# Patient Record
Sex: FEMALE | Race: WHITE | NOT HISPANIC OR LATINO | Employment: OTHER | URBAN - METROPOLITAN AREA
[De-identification: names, ages, dates, MRNs, and addresses within clinical notes are randomized per-mention and may not be internally consistent; named-entity substitution may affect disease eponyms.]

---

## 2018-08-02 ENCOUNTER — HOSPITAL ENCOUNTER (OUTPATIENT)
Dept: LAB | Facility: MEDICAL CENTER | Age: 73
End: 2018-08-02
Attending: UROLOGY
Payer: MEDICARE

## 2018-08-02 PROCEDURE — 87086 URINE CULTURE/COLONY COUNT: CPT

## 2018-08-04 LAB
AMBIGUOUS DTTM AMBI4: NORMAL
SIGNIFICANT IND 70042: NORMAL
SITE SITE: NORMAL
SOURCE SOURCE: NORMAL

## 2018-08-05 LAB
BACTERIA UR CULT: NORMAL
SIGNIFICANT IND 70042: NORMAL
SITE SITE: NORMAL
SOURCE SOURCE: NORMAL

## 2018-09-20 ENCOUNTER — HOSPITAL ENCOUNTER (OUTPATIENT)
Dept: LAB | Facility: MEDICAL CENTER | Age: 73
End: 2018-09-20
Attending: NURSE PRACTITIONER
Payer: MEDICARE

## 2018-09-20 LAB
INR PPP: 1.01 (ref 0.87–1.13)
PLATELET # BLD AUTO: 190 K/UL (ref 164–446)
PROTHROMBIN TIME: 13.4 SEC (ref 12–14.6)

## 2018-09-20 PROCEDURE — 85049 AUTOMATED PLATELET COUNT: CPT

## 2018-09-20 PROCEDURE — 36415 COLL VENOUS BLD VENIPUNCTURE: CPT

## 2018-09-20 PROCEDURE — 85610 PROTHROMBIN TIME: CPT

## 2018-09-20 RX ORDER — ATORVASTATIN CALCIUM 20 MG/1
TABLET, FILM COATED ORAL
COMMUNITY
Start: 2018-07-30 | End: 2018-09-20 | Stop reason: CLARIF

## 2018-09-20 RX ORDER — CARVEDILOL 12.5 MG/1
12.5 TABLET ORAL 2 TIMES DAILY WITH MEALS
COMMUNITY

## 2018-09-20 RX ORDER — LOVASTATIN 20 MG/1
20 TABLET ORAL NIGHTLY
Status: ON HOLD | COMMUNITY
End: 2019-03-07

## 2018-09-25 ENCOUNTER — APPOINTMENT (OUTPATIENT)
Dept: RADIOLOGY | Facility: MEDICAL CENTER | Age: 73
End: 2018-09-25
Attending: INTERNAL MEDICINE
Payer: MEDICARE

## 2018-09-25 ENCOUNTER — HOSPITAL ENCOUNTER (OUTPATIENT)
Facility: MEDICAL CENTER | Age: 73
End: 2018-09-25
Attending: INTERNAL MEDICINE | Admitting: INTERNAL MEDICINE
Payer: MEDICARE

## 2018-09-25 VITALS
SYSTOLIC BLOOD PRESSURE: 112 MMHG | RESPIRATION RATE: 16 BRPM | WEIGHT: 161 LBS | BODY MASS INDEX: 30.4 KG/M2 | OXYGEN SATURATION: 99 % | HEIGHT: 61 IN | HEART RATE: 74 BPM | DIASTOLIC BLOOD PRESSURE: 56 MMHG

## 2018-09-25 DIAGNOSIS — C67.4 MALIGNANT NEOPLASM OF POSTERIOR WALL OF URINARY BLADDER (HCC): ICD-10-CM

## 2018-09-25 PROCEDURE — 99153 MOD SED SAME PHYS/QHP EA: CPT

## 2018-09-25 PROCEDURE — 700111 HCHG RX REV CODE 636 W/ 250 OVERRIDE (IP)

## 2018-09-25 PROCEDURE — 160002 HCHG RECOVERY MINUTES (STAT)

## 2018-09-25 PROCEDURE — 700101 HCHG RX REV CODE 250

## 2018-09-25 RX ORDER — ONDANSETRON 2 MG/ML
4 INJECTION INTRAMUSCULAR; INTRAVENOUS PRN
Status: DISCONTINUED | OUTPATIENT
Start: 2018-09-25 | End: 2018-09-25 | Stop reason: HOSPADM

## 2018-09-25 RX ORDER — OXYCODONE HYDROCHLORIDE 5 MG/1
5 TABLET ORAL
Status: DISCONTINUED | OUTPATIENT
Start: 2018-09-25 | End: 2018-09-25 | Stop reason: HOSPADM

## 2018-09-25 RX ORDER — MORPHINE SULFATE 4 MG/ML
4 INJECTION, SOLUTION INTRAMUSCULAR; INTRAVENOUS
Status: DISCONTINUED | OUTPATIENT
Start: 2018-09-25 | End: 2018-09-25 | Stop reason: HOSPADM

## 2018-09-25 RX ORDER — SODIUM CHLORIDE 9 MG/ML
500 INJECTION, SOLUTION INTRAVENOUS
Status: DISCONTINUED | OUTPATIENT
Start: 2018-09-25 | End: 2018-09-25 | Stop reason: HOSPADM

## 2018-09-25 RX ORDER — ONDANSETRON 2 MG/ML
4 INJECTION INTRAMUSCULAR; INTRAVENOUS EVERY 8 HOURS PRN
Status: DISCONTINUED | OUTPATIENT
Start: 2018-09-25 | End: 2018-09-25 | Stop reason: HOSPADM

## 2018-09-25 RX ORDER — SODIUM CHLORIDE 9 MG/ML
120 INJECTION, SOLUTION INTRAVENOUS
Status: DISCONTINUED | OUTPATIENT
Start: 2018-09-25 | End: 2018-09-25 | Stop reason: HOSPADM

## 2018-09-25 RX ORDER — MIDAZOLAM HYDROCHLORIDE 1 MG/ML
INJECTION INTRAMUSCULAR; INTRAVENOUS
Status: COMPLETED
Start: 2018-09-25 | End: 2018-09-25

## 2018-09-25 RX ORDER — LIDOCAINE HYDROCHLORIDE 10 MG/ML
INJECTION, SOLUTION INFILTRATION; PERINEURAL
Status: COMPLETED
Start: 2018-09-25 | End: 2018-09-25

## 2018-09-25 RX ORDER — LIDOCAINE HYDROCHLORIDE AND EPINEPHRINE BITARTRATE 20; .01 MG/ML; MG/ML
INJECTION, SOLUTION SUBCUTANEOUS
Status: COMPLETED
Start: 2018-09-25 | End: 2018-09-25

## 2018-09-25 RX ORDER — OXYCODONE HYDROCHLORIDE 10 MG/1
10 TABLET ORAL
Status: DISCONTINUED | OUTPATIENT
Start: 2018-09-25 | End: 2018-09-25 | Stop reason: HOSPADM

## 2018-09-25 RX ORDER — MIDAZOLAM HYDROCHLORIDE 1 MG/ML
.5-2 INJECTION INTRAMUSCULAR; INTRAVENOUS PRN
Status: DISCONTINUED | OUTPATIENT
Start: 2018-09-25 | End: 2018-09-25 | Stop reason: HOSPADM

## 2018-09-25 RX ADMIN — MIDAZOLAM 2 MG: 1 INJECTION INTRAMUSCULAR; INTRAVENOUS at 13:19

## 2018-09-25 RX ADMIN — LIDOCAINE HYDROCHLORIDE AND EPINEPHRINE: 20; 10 INJECTION, SOLUTION INFILTRATION; PERINEURAL at 13:20

## 2018-09-25 RX ADMIN — HEPARIN: 100 SYRINGE at 13:43

## 2018-09-25 RX ADMIN — LIDOCAINE HYDROCHLORIDE: 10 INJECTION, SOLUTION INFILTRATION; PERINEURAL at 13:20

## 2018-09-25 RX ADMIN — MIDAZOLAM HYDROCHLORIDE 2 MG: 1 INJECTION INTRAMUSCULAR; INTRAVENOUS at 13:25

## 2018-09-25 RX ADMIN — FENTANYL CITRATE 25 MCG: 50 INJECTION, SOLUTION INTRAMUSCULAR; INTRAVENOUS at 13:19

## 2018-09-25 RX ADMIN — MIDAZOLAM HYDROCHLORIDE 2 MG: 1 INJECTION INTRAMUSCULAR; INTRAVENOUS at 13:19

## 2018-09-25 ASSESSMENT — PAIN SCALES - GENERAL: PAINLEVEL_OUTOF10: 0

## 2018-09-25 NOTE — OR SURGEON
Immediate Post- Operative Note        PostOp Diagnosis: Bladder cancer requiring chemotherapy.       Procedure(s): Port placement.       Estimated Blood Loss: Less than 5 ml        Complications: None            9/25/2018     1335 PM     Jeremy Doss

## 2018-09-25 NOTE — DISCHARGE INSTRUCTIONS
ACTIVITY: Rest and take it easy for the first 24 hours.  A responsible adult is recommended to remain with you during that time.  It is normal to feel sleepy.  We encourage you to not do anything that requires balance, judgment or coordination.    MILD FLU-LIKE SYMPTOMS ARE NORMAL. YOU MAY EXPERIENCE GENERALIZED MUSCLE ACHES, THROAT IRRITATION, HEADACHE AND/OR SOME NAUSEA.    FOR 24 HOURS DO NOT:  Drive, operate machinery or run household appliances.  Drink beer or alcoholic beverages.   Make important decisions or sign legal documents.    SPECIAL INSTRUCTIONS: No shower or bath for 1 week. Sponge Bath only.  Keep surgical site clean dry and covered until fully healed. Do not lift right arm above head for 1 week, Do not lift over 10 lbs for 1 week with right arm.     DIET: To avoid nausea, slowly advance diet as tolerated, avoiding spicy or greasy foods for the first day.  Add more substantial food to your diet according to your physician's instructions.  Babies can be fed formula or breast milk as soon as they are hungry.  INCREASE FLUIDS AND FIBER TO AVOID CONSTIPATION.    SURGICAL DRESSING/BATHING: No shower or bath for 1 week. Sponge Bath only.  Keep surgical site clean dry and covered until fully healed.    FOLLOW-UP APPOINTMENT:  A follow-up appointment should be arranged with your doctor in 1 week; call to schedule.    You should CALL YOUR PHYSICIAN if you develop:  Fever greater than 101 degrees F.  Pain not relieved by medication, or persistent nausea or vomiting.  Excessive bleeding (blood soaking through dressing) or unexpected drainage from the wound.  Extreme redness or swelling around the incision site, drainage of pus or foul smelling drainage.  Inability to urinate or empty your bladder within 8 hours.  Problems with breathing or chest pain.    You should call 911 if you develop problems with breathing or chest pain.  If you are unable to contact your doctor or surgical center, you should go to the  nearest emergency room or urgent care center.     If any questions arise, call your doctor.  If your doctor is not available, please feel free to call the Surgical Center at [phone].  The Center is open Monday through Friday from 7AM to 7PM.  You can also call the HEALTH HOTLINE open 24 hours/day, 7 days/week and speak to a nurse at [phone], or toll free at [phone].    A registered nurse may call you a few days after your surgery to see how you are doing after your procedure.    MEDICATIONS: Resume taking daily medication.  Take prescribed pain medication with food.  If no medication is prescribed, you may take non-aspirin pain medication if needed.  PAIN MEDICATION CAN BE VERY CONSTIPATING.  Take a stool softener or laxative such as senokot, pericolace, or milk of magnesia if needed.      If your physician has prescribed pain medication that includes Acetaminophen (Tylenol), do not take additional Acetaminophen (Tylenol) while taking the prescribed medication.    Depression / Suicide Risk    As you are discharged from this [facility] facility, it is important to learn how to keep safe from harming yourself.    Recognize the warning signs:  · Abrupt changes in personality, positive or negative- including increase in energy   · Giving away possessions  · Change in eating patterns- significant weight changes-  positive or negative  · Change in sleeping patterns- unable to sleep or sleeping all the time   · Unwillingness or inability to communicate  · Depression  · Unusual sadness, discouragement and loneliness  · Talk of wanting to die  · Neglect of personal appearance   · Rebelliousness- reckless behavior  · Withdrawal from people/activities they love  · Confusion- inability to concentrate     If you or a loved one observes any of these behaviors or has concerns about self-harm, here's what you can do:  · Talk about it- your feelings and reasons for harming yourself  · Remove any means that you  might use to hurt yourself (examples: pills, rope, extension cords, firearm)  · Get professional help from the community (Mental Health, Substance Abuse, psychological counseling)  · Do not be alone:Call your Safe Contact- someone whom you trust who will be there for you.  · Call your local CRISIS HOTLINE 535-9137 or 871-548-0822  · Call your local Children's Mobile Crisis Response Team [place] (353) 017-4756 or www.VideoElephant.com  · Call the toll free National Suicide Prevention Hotlines   · National Suicide Prevention Lifeline 218-157-GPNO (2090)  · National Hope Line Network 800-SUICIDE (862-7407)      Implanted Port Home Guide  An implanted port is a type of central line that is placed under the skin. Central lines are used to provide IV access when treatment or nutrition needs to be given through a person's veins. Implanted ports are used for long-term IV access. An implanted port may be placed because:   · You need IV medicine that would be irritating to the small veins in your hands or arms.    · You need long-term IV medicines, such as antibiotics.    · You need IV nutrition for a long period.    · You need frequent blood draws for lab tests.    · You need dialysis.    Implanted ports are usually placed in the chest area, but they can also be placed in the upper arm, the abdomen, or the leg. An implanted port has two main parts:   · [place]. The reservoir is round and will appear as a small, raised area under your skin. The reservoir is the part where a needle is inserted to give medicines or draw blood.    · Catheter. The catheter is a thin, flexible tube that extends from the reservoir. The catheter is placed into a large vein. Medicine that is inserted into the reservoir goes into the catheter and then into the vein.    HOW WILL I CARE FOR MY INCISION SITE?  Do not get the incision site wet. Bathe or shower as directed by your health care provider.   HOW IS MY PORT ACCESSED?  Special steps must  "be taken to access the port:   · Before the port is accessed, a numbing cream can be placed on the skin. This helps numb the skin over the port site.    · Your health care provider uses a sterile technique to access the port.  ¨ Your health care provider must put on a mask and sterile gloves.  ¨ The skin over your port is cleaned carefully with an antiseptic and allowed to dry.  ¨ The port is gently pinched between sterile gloves, and a needle is inserted into the port.  · Only \"non-coring\" port needles should be used to access the port. Once the port is accessed, a blood return should be checked. This helps ensure that the port is in the vein and is not clogged.    · If your port needs to remain accessed for a constant infusion, a clear (transparent) bandage will be placed over the needle site. The bandage and needle will need to be changed every week, or as directed by your health care provider.    · Keep the bandage covering the needle clean and dry. Do not get it wet. Follow your health care provider's instructions on how to take a shower or bath while the port is accessed.    · If your port does not need to stay accessed, no bandage is needed over the port.    WHAT IS FLUSHING?  Flushing helps keep the port from getting clogged. Follow your health care provider's instructions on how and when to flush the port. Ports are usually flushed with saline solution or a medicine called heparin. The need for flushing will depend on how the port is used.   · If the port is used for intermittent medicines or blood draws, the port will need to be flushed:    ¨ After medicines have been given.    ¨ After blood has been drawn.    ¨ As part of routine maintenance.    · If a constant infusion is running, the port may not need to be flushed.    HOW LONG WILL MY PORT STAY IMPLANTED?  The port can stay in for as long as your health care provider thinks it is needed. When it is time for the port to come out, surgery will be done to " remove it. The procedure is similar to the one performed when the port was put in.   WHEN SHOULD I SEEK IMMEDIATE MEDICAL CARE?  When you have an implanted port, you should seek immediate medical care if:   · You notice a bad smell coming from the incision site.    · You have swelling, redness, or drainage at the incision site.    · You have more swelling or pain at the port site or the surrounding area.    · You have a fever that is not controlled with medicine.     This information is not intended to replace advice given to you by your health care provider. Make sure you discuss any questions you have with your health care provider.     Document Released: 12/18/2006 Document Revised: 10/08/2014 Document Reviewed: 08/25/2014  [employer] Interactive Patient Education ©2016 [employer]    Implanted Port Insertion, Care After  Refer to this sheet in the next few weeks. These instructions provide you with information on caring for yourself after your procedure. Your health care provider may also give you more specific instructions. Your treatment has been planned according to current medical practices, but problems sometimes occur. Call your health care provider if you have any problems or questions after your procedure.  WHAT TO EXPECT AFTER THE PROCEDURE  After your procedure, it is typical to have the following:   · Discomfort at the port insertion site. Ice packs to the area will help.  · Bruising on the skin over the port. This will subside in 3-4 days.  HOME CARE INSTRUCTIONS  · After your port is placed, you will get a 's information card. The card has information about your port. Keep this card with you at all times.    · Know what kind of port you have. There are many types of ports available.    · Wear a medical alert bracelet in case of an emergency. This can help alert health care workers that you have a port.    · The port can stay in for as long as your health care provider believes it is  necessary.    · A home health care nurse may give medicines and take care of the port.    · You or a family member can get special training and directions for giving medicine and taking care of the port at home.    SEEK MEDICAL CARE IF:   · Your port does not flush or you are unable to get a blood return.    · You have a fever or chills.  SEEK IMMEDIATE MEDICAL CARE IF:  · You have new fluid or pus coming from your incision.    · You notice a bad smell coming from your incision site.    · You have swelling, pain, or more redness at the incision or port site.    · You have chest pain or shortness of breath.     This information is not intended to replace advice given to you by your health care provider. Make sure you discuss any questions you have with your health care provider.     Document Released: 10/08/2014 Document Revised: 12/23/2014 Document Reviewed: 10/08/2014  [employer] Interactive Patient Education ©2016 [employer]

## 2018-09-25 NOTE — PROGRESS NOTES
OP IR RN note:    Site Marked and Confirmed with MD, patient and RN pre procedure   Tunneled port placement by MD Doss assisted by RT glaser, Right chest and IJ access site;  Port placed   BARD PowerPort ClearVUE Slim implantable port 8 Fr 24 cm REF# [id number] LOT# [id number]   End tidal CO2 range 26-29 during procedure   Patient tolerated procedure, hemodynamically stable; pt awake and talking post procedure; see flow sheet for vitals and post op print out    patient transported back to OP IR pod 1 via IR RN monitored

## 2018-09-25 NOTE — PROGRESS NOTES
Discharge instructions reviewed with patient and family, all questions answered. IV removed and belongings returned.  Patient ambulated out in a stable condition.     ACTIVITY: Rest and take it easy for the first 24 hours.  A responsible adult is recommended to remain with you during that time.  It is normal to feel sleepy.  We encourage you to not do anything that requires balance, judgment or coordination.    MILD FLU-LIKE SYMPTOMS ARE NORMAL. YOU MAY EXPERIENCE GENERALIZED MUSCLE ACHES, THROAT IRRITATION, HEADACHE AND/OR SOME NAUSEA.    FOR 24 HOURS DO NOT:  Drive, operate machinery or run household appliances.  Drink beer or alcoholic beverages.   Make important decisions or sign legal documents.    SPECIAL INSTRUCTIONS: No shower or bath for 1 week. Sponge Bath only.  Keep surgical site clean dry and covered until fully healed. Do not lift right arm above head for 1 week, Do not lift over 10 lbs for 1 week with right arm.     DIET: To avoid nausea, slowly advance diet as tolerated, avoiding spicy or greasy foods for the first day.  Add more substantial food to your diet according to your physician's instructions.  Babies can be fed formula or breast milk as soon as they are hungry.  INCREASE FLUIDS AND FIBER TO AVOID CONSTIPATION.    SURGICAL DRESSING/BATHING: No shower or bath for 1 week. Sponge Bath only.  Keep surgical site clean dry and covered until fully healed.    FOLLOW-UP APPOINTMENT:  A follow-up appointment should be arranged with your doctor in 1 week; call to schedule.    You should CALL YOUR PHYSICIAN if you develop:  Fever greater than 101 degrees F.  Pain not relieved by medication, or persistent nausea or vomiting.  Excessive bleeding (blood soaking through dressing) or unexpected drainage from the wound.  Extreme redness or swelling around the incision site, drainage of pus or foul smelling drainage.  Inability to urinate or empty your bladder within 8 hours.  Problems with breathing or chest  pain.    You should call 911 if you develop problems with breathing or chest pain.  If you are unable to contact your doctor or surgical center, you should go to the nearest emergency room or urgent care center.     If any questions arise, call your doctor.  If your doctor is not available, please feel free to call the Surgical Center at [phone].  The Center is open Monday through Friday from 7AM to 7PM.  You can also call the HEALTH HOTLINE open 24 hours/day, 7 days/week and speak to a nurse at [phone], or toll free at [phone].    A registered nurse may call you a few days after your surgery to see how you are doing after your procedure.    MEDICATIONS: Resume taking daily medication.  Take prescribed pain medication with food.  If no medication is prescribed, you may take non-aspirin pain medication if needed.  PAIN MEDICATION CAN BE VERY CONSTIPATING.  Take a stool softener or laxative such as senokot, pericolace, or milk of magnesia if needed.      If your physician has prescribed pain medication that includes Acetaminophen (Tylenol), do not take additional Acetaminophen (Tylenol) while taking the prescribed medication.    Depression / Suicide Risk    As you are discharged from this [facility] facility, it is important to learn how to keep safe from harming yourself.    Recognize the warning signs:  · Abrupt changes in personality, positive or negative- including increase in energy   · Giving away possessions  · Change in eating patterns- significant weight changes-  positive or negative  · Change in sleeping patterns- unable to sleep or sleeping all the time   · Unwillingness or inability to communicate  · Depression  · Unusual sadness, discouragement and loneliness  · Talk of wanting to die  · Neglect of personal appearance   · Rebelliousness- reckless behavior  · Withdrawal from people/activities they love  · Confusion- inability to concentrate     If you or a loved one observes any of  these behaviors or has concerns about self-harm, here's what you can do:  · Talk about it- your feelings and reasons for harming yourself  · Remove any means that you might use to hurt yourself (examples: pills, rope, extension cords, firearm)  · Get professional help from the community (Mental Health, Substance Abuse, psychological counseling)  · Do not be alone:Call your Safe Contact- someone whom you trust who will be there for you.  · Call your local CRISIS HOTLINE 911-4557 or 893-290-3523  · Call your local Children's Mobile Crisis Response Team [place] (587) 643-2548 or www.[employer]  · Call the toll free National Suicide Prevention Hotlines   · National Suicide Prevention Lifeline 347-288-DVKW (5167)  · National Hope Line Network 800-SUICIDE (146-0178)      Implanted Port Home Guide  An implanted port is a type of central line that is placed under the skin. Central lines are used to provide IV access when treatment or nutrition needs to be given through a person's veins. Implanted ports are used for long-term IV access. An implanted port may be placed because:   · You need IV medicine that would be irritating to the small veins in your hands or arms.    · You need long-term IV medicines, such as antibiotics.    · You need IV nutrition for a long period.    · You need frequent blood draws for lab tests.    · You need dialysis.    Implanted ports are usually placed in the chest area, but they can also be placed in the upper arm, the abdomen, or the leg. An implanted port has two main parts:   · [place]. The reservoir is round and will appear as a small, raised area under your skin. The reservoir is the part where a needle is inserted to give medicines or draw blood.    · Catheter. The catheter is a thin, flexible tube that extends from the reservoir. The catheter is placed into a large vein. Medicine that is inserted into the reservoir goes into the catheter and then into the vein.    HOW WILL I  "CARE FOR MY INCISION SITE?  Do not get the incision site wet. Bathe or shower as directed by your health care provider.   HOW IS MY PORT ACCESSED?  Special steps must be taken to access the port:   · Before the port is accessed, a numbing cream can be placed on the skin. This helps numb the skin over the port site.    · Your health care provider uses a sterile technique to access the port.  ¨ Your health care provider must put on a mask and sterile gloves.  ¨ The skin over your port is cleaned carefully with an antiseptic and allowed to dry.  ¨ The port is gently pinched between sterile gloves, and a needle is inserted into the port.  · Only \"non-coring\" port needles should be used to access the port. Once the port is accessed, a blood return should be checked. This helps ensure that the port is in the vein and is not clogged.    · If your port needs to remain accessed for a constant infusion, a clear (transparent) bandage will be placed over the needle site. The bandage and needle will need to be changed every week, or as directed by your health care provider.    · Keep the bandage covering the needle clean and dry. Do not get it wet. Follow your health care provider's instructions on how to take a shower or bath while the port is accessed.    · If your port does not need to stay accessed, no bandage is needed over the port.    WHAT IS FLUSHING?  Flushing helps keep the port from getting clogged. Follow your health care provider's instructions on how and when to flush the port. Ports are usually flushed with saline solution or a medicine called heparin. The need for flushing will depend on how the port is used.   · If the port is used for intermittent medicines or blood draws, the port will need to be flushed:    ¨ After medicines have been given.    ¨ After blood has been drawn.    ¨ As part of routine maintenance.    · If a constant infusion is running, the port may not need to be flushed.    HOW LONG WILL MY " PORT STAY IMPLANTED?  The port can stay in for as long as your health care provider thinks it is needed. When it is time for the port to come out, surgery will be done to remove it. The procedure is similar to the one performed when the port was put in.   WHEN SHOULD I SEEK IMMEDIATE MEDICAL CARE?  When you have an implanted port, you should seek immediate medical care if:   · You notice a bad smell coming from the incision site.    · You have swelling, redness, or drainage at the incision site.    · You have more swelling or pain at the port site or the surrounding area.    · You have a fever that is not controlled with medicine.     This information is not intended to replace advice given to you by your health care provider. Make sure you discuss any questions you have with your health care provider.     Document Released: 12/18/2006 Document Revised: 10/08/2014 Document Reviewed: 08/25/2014  [employer] Interactive Patient Education ©2016 [employer]    Implanted Port Insertion, Care After  Refer to this sheet in the next few weeks. These instructions provide you with information on caring for yourself after your procedure. Your health care provider may also give you more specific instructions. Your treatment has been planned according to current medical practices, but problems sometimes occur. Call your health care provider if you have any problems or questions after your procedure.  WHAT TO EXPECT AFTER THE PROCEDURE  After your procedure, it is typical to have the following:   · Discomfort at the port insertion site. Ice packs to the area will help.  · Bruising on the skin over the port. This will subside in 3-4 days.  HOME CARE INSTRUCTIONS  · After your port is placed, you will get a 's information card. The card has information about your port. Keep this card with you at all times.    · Know what kind of port you have. There are many types of ports available.    · Wear a medical alert bracelet in  case of an emergency. This can help alert health care workers that you have a port.    · The port can stay in for as long as your health care provider believes it is necessary.    · A home health care nurse may give medicines and take care of the port.    · You or a family member can get special training and directions for giving medicine and taking care of the port at home.    SEEK MEDICAL CARE IF:   · Your port does not flush or you are unable to get a blood return.    · You have a fever or chills.  SEEK IMMEDIATE MEDICAL CARE IF:  · You have new fluid or pus coming from your incision.    · You notice a bad smell coming from your incision site.    · You have swelling, pain, or more redness at the incision or port site.    · You have chest pain or shortness of breath.     This information is not intended to replace advice given to you by your health care provider. Make sure you discuss any questions you have with your health care provider.     Document Released: 10/08/2014 Document Revised: 12/23/2014 Document Reviewed: 10/08/2014  [employer] Interactive Patient Education ©2016 [employer]

## 2018-09-27 ENCOUNTER — HOSPITAL ENCOUNTER (OUTPATIENT)
Dept: RADIOLOGY | Facility: MEDICAL CENTER | Age: 73
End: 2018-09-27
Attending: INTERNAL MEDICINE
Payer: MEDICARE

## 2018-09-27 DIAGNOSIS — C67.4 MALIGNANT NEOPLASM OF POSTERIOR WALL OF URINARY BLADDER (HCC): ICD-10-CM

## 2018-09-27 PROCEDURE — 700117 HCHG RX CONTRAST REV CODE 255: Performed by: INTERNAL MEDICINE

## 2018-09-27 PROCEDURE — A9503 TC99M MEDRONATE: HCPCS

## 2018-09-27 PROCEDURE — 71260 CT THORAX DX C+: CPT

## 2018-09-27 RX ADMIN — IOHEXOL 100 ML: 350 INJECTION, SOLUTION INTRAVENOUS at 15:32

## 2018-09-27 RX ADMIN — IOHEXOL 50 ML: 240 INJECTION, SOLUTION INTRATHECAL; INTRAVASCULAR; INTRAVENOUS; ORAL at 15:32

## 2018-10-15 ENCOUNTER — HOSPITAL ENCOUNTER (OUTPATIENT)
Dept: LAB | Facility: MEDICAL CENTER | Age: 73
End: 2018-10-15
Attending: INTERNAL MEDICINE
Payer: MEDICARE

## 2018-10-15 PROCEDURE — 80053 COMPREHEN METABOLIC PANEL: CPT

## 2018-10-15 PROCEDURE — 36415 COLL VENOUS BLD VENIPUNCTURE: CPT

## 2018-10-15 PROCEDURE — 83735 ASSAY OF MAGNESIUM: CPT

## 2018-10-16 LAB
ALBUMIN SERPL BCP-MCNC: 3.8 G/DL (ref 3.2–4.9)
ALBUMIN/GLOB SERPL: 1.5 G/DL
ALP SERPL-CCNC: 80 U/L (ref 30–99)
ALT SERPL-CCNC: 17 U/L (ref 2–50)
ANION GAP SERPL CALC-SCNC: 4 MMOL/L (ref 0–11.9)
AST SERPL-CCNC: 16 U/L (ref 12–45)
BILIRUB SERPL-MCNC: 0.3 MG/DL (ref 0.1–1.5)
BUN SERPL-MCNC: 18 MG/DL (ref 8–22)
CALCIUM SERPL-MCNC: 9.2 MG/DL (ref 8.5–10.5)
CHLORIDE SERPL-SCNC: 107 MMOL/L (ref 96–112)
CO2 SERPL-SCNC: 27 MMOL/L (ref 20–33)
CREAT SERPL-MCNC: 0.95 MG/DL (ref 0.5–1.4)
GLOBULIN SER CALC-MCNC: 2.5 G/DL (ref 1.9–3.5)
GLUCOSE SERPL-MCNC: 106 MG/DL (ref 65–99)
MAGNESIUM SERPL-MCNC: 2.2 MG/DL (ref 1.5–2.5)
POTASSIUM SERPL-SCNC: 4.3 MMOL/L (ref 3.6–5.5)
PROT SERPL-MCNC: 6.3 G/DL (ref 6–8.2)
SODIUM SERPL-SCNC: 138 MMOL/L (ref 135–145)

## 2018-10-29 ENCOUNTER — HOSPITAL ENCOUNTER (OUTPATIENT)
Dept: LAB | Facility: MEDICAL CENTER | Age: 73
End: 2018-10-29
Attending: INTERNAL MEDICINE
Payer: MEDICARE

## 2018-10-29 LAB
ALBUMIN SERPL BCP-MCNC: 4.2 G/DL (ref 3.2–4.9)
ALBUMIN/GLOB SERPL: 2 G/DL
ALP SERPL-CCNC: 75 U/L (ref 30–99)
ALT SERPL-CCNC: 16 U/L (ref 2–50)
ANION GAP SERPL CALC-SCNC: 6 MMOL/L (ref 0–11.9)
AST SERPL-CCNC: 17 U/L (ref 12–45)
BILIRUB SERPL-MCNC: 0.3 MG/DL (ref 0.1–1.5)
BUN SERPL-MCNC: 17 MG/DL (ref 8–22)
CALCIUM SERPL-MCNC: 9.4 MG/DL (ref 8.5–10.5)
CHLORIDE SERPL-SCNC: 108 MMOL/L (ref 96–112)
CO2 SERPL-SCNC: 30 MMOL/L (ref 20–33)
CREAT SERPL-MCNC: 0.99 MG/DL (ref 0.5–1.4)
GLOBULIN SER CALC-MCNC: 2.1 G/DL (ref 1.9–3.5)
GLUCOSE SERPL-MCNC: 113 MG/DL (ref 65–99)
MAGNESIUM SERPL-MCNC: 2.2 MG/DL (ref 1.5–2.5)
POTASSIUM SERPL-SCNC: 4.2 MMOL/L (ref 3.6–5.5)
PROT SERPL-MCNC: 6.3 G/DL (ref 6–8.2)
SODIUM SERPL-SCNC: 144 MMOL/L (ref 135–145)

## 2018-10-29 PROCEDURE — 83735 ASSAY OF MAGNESIUM: CPT

## 2018-10-29 PROCEDURE — 80053 COMPREHEN METABOLIC PANEL: CPT

## 2018-10-29 PROCEDURE — 36415 COLL VENOUS BLD VENIPUNCTURE: CPT

## 2018-11-16 ENCOUNTER — HOSPITAL ENCOUNTER (OUTPATIENT)
Dept: LAB | Facility: MEDICAL CENTER | Age: 73
End: 2018-11-16
Attending: INTERNAL MEDICINE
Payer: MEDICARE

## 2018-11-16 PROCEDURE — 83735 ASSAY OF MAGNESIUM: CPT

## 2018-11-16 PROCEDURE — 80053 COMPREHEN METABOLIC PANEL: CPT

## 2018-11-16 PROCEDURE — 36415 COLL VENOUS BLD VENIPUNCTURE: CPT

## 2018-11-17 LAB
ALBUMIN SERPL BCP-MCNC: 3.6 G/DL (ref 3.2–4.9)
ALBUMIN/GLOB SERPL: 1.6 G/DL
ALP SERPL-CCNC: 71 U/L (ref 30–99)
ALT SERPL-CCNC: 11 U/L (ref 2–50)
ANION GAP SERPL CALC-SCNC: 6 MMOL/L (ref 0–11.9)
AST SERPL-CCNC: 13 U/L (ref 12–45)
BILIRUB SERPL-MCNC: 0.3 MG/DL (ref 0.1–1.5)
BUN SERPL-MCNC: 22 MG/DL (ref 8–22)
CALCIUM SERPL-MCNC: 9.2 MG/DL (ref 8.5–10.5)
CHLORIDE SERPL-SCNC: 105 MMOL/L (ref 96–112)
CO2 SERPL-SCNC: 27 MMOL/L (ref 20–33)
CREAT SERPL-MCNC: 0.78 MG/DL (ref 0.5–1.4)
GLOBULIN SER CALC-MCNC: 2.2 G/DL (ref 1.9–3.5)
GLUCOSE SERPL-MCNC: 113 MG/DL (ref 65–99)
MAGNESIUM SERPL-MCNC: 1.9 MG/DL (ref 1.5–2.5)
POTASSIUM SERPL-SCNC: 3.9 MMOL/L (ref 3.6–5.5)
PROT SERPL-MCNC: 5.8 G/DL (ref 6–8.2)
SODIUM SERPL-SCNC: 138 MMOL/L (ref 135–145)

## 2018-12-03 ENCOUNTER — HOSPITAL ENCOUNTER (OUTPATIENT)
Facility: MEDICAL CENTER | Age: 73
End: 2018-12-03
Attending: INTERNAL MEDICINE
Payer: MEDICARE

## 2018-12-03 LAB
APPEARANCE UR: CLEAR
BACTERIA #/AREA URNS HPF: ABNORMAL /HPF
BILIRUB UR QL STRIP.AUTO: NEGATIVE
COLOR UR: YELLOW
EPI CELLS #/AREA URNS HPF: NEGATIVE /HPF
GLUCOSE UR STRIP.AUTO-MCNC: NEGATIVE MG/DL
HYALINE CASTS #/AREA URNS LPF: ABNORMAL /LPF
KETONES UR STRIP.AUTO-MCNC: NEGATIVE MG/DL
LEUKOCYTE ESTERASE UR QL STRIP.AUTO: ABNORMAL
MICRO URNS: ABNORMAL
NITRITE UR QL STRIP.AUTO: NEGATIVE
PH UR STRIP.AUTO: 5.5 [PH]
PROT UR QL STRIP: 30 MG/DL
RBC # URNS HPF: ABNORMAL /HPF
RBC UR QL AUTO: NEGATIVE
SP GR UR STRIP.AUTO: 1.02
UROBILINOGEN UR STRIP.AUTO-MCNC: 0.2 MG/DL
WBC #/AREA URNS HPF: ABNORMAL /HPF

## 2018-12-03 PROCEDURE — 87086 URINE CULTURE/COLONY COUNT: CPT

## 2018-12-03 PROCEDURE — 81001 URINALYSIS AUTO W/SCOPE: CPT

## 2018-12-05 LAB
BACTERIA UR CULT: NORMAL
SIGNIFICANT IND 70042: NORMAL
SITE SITE: NORMAL
SOURCE SOURCE: NORMAL

## 2018-12-12 ENCOUNTER — HOSPITAL ENCOUNTER (OUTPATIENT)
Dept: LAB | Facility: MEDICAL CENTER | Age: 73
End: 2018-12-12
Attending: INTERNAL MEDICINE
Payer: MEDICARE

## 2018-12-12 LAB
ALBUMIN SERPL BCP-MCNC: 3.8 G/DL (ref 3.2–4.9)
ALBUMIN/GLOB SERPL: 1.9 G/DL
ALP SERPL-CCNC: 117 U/L (ref 30–99)
ALT SERPL-CCNC: 8 U/L (ref 2–50)
ANION GAP SERPL CALC-SCNC: 8 MMOL/L (ref 0–11.9)
ANISOCYTOSIS BLD QL SMEAR: ABNORMAL
APPEARANCE UR: CLEAR
AST SERPL-CCNC: 12 U/L (ref 12–45)
BACTERIA #/AREA URNS HPF: NEGATIVE /HPF
BASOPHILS # BLD AUTO: 0.9 % (ref 0–1.8)
BASOPHILS # BLD: 0.07 K/UL (ref 0–0.12)
BILIRUB SERPL-MCNC: 0.3 MG/DL (ref 0.1–1.5)
BILIRUB UR QL STRIP.AUTO: NEGATIVE
BUN SERPL-MCNC: 24 MG/DL (ref 8–22)
CALCIUM SERPL-MCNC: 9.3 MG/DL (ref 8.5–10.5)
CHLORIDE SERPL-SCNC: 103 MMOL/L (ref 96–112)
CO2 SERPL-SCNC: 28 MMOL/L (ref 20–33)
COLOR UR: YELLOW
CREAT SERPL-MCNC: 0.79 MG/DL (ref 0.5–1.4)
DACRYOCYTES BLD QL SMEAR: NORMAL
DOHLE BOD BLD QL SMEAR: NORMAL
EOSINOPHIL # BLD AUTO: 0 K/UL (ref 0–0.51)
EOSINOPHIL NFR BLD: 0 % (ref 0–6.9)
EPI CELLS #/AREA URNS HPF: ABNORMAL /HPF
ERYTHROCYTE [DISTWIDTH] IN BLOOD BY AUTOMATED COUNT: 47.6 FL (ref 35.9–50)
GLOBULIN SER CALC-MCNC: 2 G/DL (ref 1.9–3.5)
GLUCOSE SERPL-MCNC: 108 MG/DL (ref 65–99)
GLUCOSE UR STRIP.AUTO-MCNC: NEGATIVE MG/DL
HCT VFR BLD AUTO: 26.6 % (ref 37–47)
HGB BLD-MCNC: 9.1 G/DL (ref 12–16)
HYALINE CASTS #/AREA URNS LPF: ABNORMAL /LPF
KETONES UR STRIP.AUTO-MCNC: ABNORMAL MG/DL
LEUKOCYTE ESTERASE UR QL STRIP.AUTO: ABNORMAL
LYMPHOCYTES # BLD AUTO: 1.62 K/UL (ref 1–4.8)
LYMPHOCYTES NFR BLD: 20.5 % (ref 22–41)
MAGNESIUM SERPL-MCNC: 1.6 MG/DL (ref 1.5–2.5)
MANUAL DIFF BLD: NORMAL
MCH RBC QN AUTO: 32.2 PG (ref 27–33)
MCHC RBC AUTO-ENTMCNC: 34.2 G/DL (ref 33.6–35)
MCV RBC AUTO: 94 FL (ref 81.4–97.8)
METAMYELOCYTES NFR BLD MANUAL: 1.8 %
MICRO URNS: ABNORMAL
MICROCYTES BLD QL SMEAR: ABNORMAL
MONOCYTES # BLD AUTO: 0.14 K/UL (ref 0–0.85)
MONOCYTES NFR BLD AUTO: 1.8 % (ref 0–13.4)
MORPHOLOGY BLD-IMP: NORMAL
NEUTROPHILS # BLD AUTO: 5.93 K/UL (ref 2–7.15)
NEUTROPHILS NFR BLD: 65.2 % (ref 44–72)
NEUTS BAND NFR BLD MANUAL: 9.8 % (ref 0–10)
NITRITE UR QL STRIP.AUTO: NEGATIVE
NRBC # BLD AUTO: 0 K/UL
NRBC BLD-RTO: 0 /100 WBC
OVALOCYTES BLD QL SMEAR: NORMAL
PH UR STRIP.AUTO: 5.5 [PH]
PLATELET # BLD AUTO: 14 K/UL (ref 164–446)
PLATELET BLD QL SMEAR: NORMAL
PLATELETS.RETICULATED NFR BLD AUTO: 6.4 K/UL (ref 0.6–13.1)
PMV BLD AUTO: 12.8 FL (ref 9–12.9)
POIKILOCYTOSIS BLD QL SMEAR: NORMAL
POTASSIUM SERPL-SCNC: 4 MMOL/L (ref 3.6–5.5)
PROT SERPL-MCNC: 5.8 G/DL (ref 6–8.2)
PROT UR QL STRIP: 30 MG/DL
RBC # BLD AUTO: 2.83 M/UL (ref 4.2–5.4)
RBC # URNS HPF: ABNORMAL /HPF
RBC BLD AUTO: PRESENT
RBC UR QL AUTO: ABNORMAL
SODIUM SERPL-SCNC: 139 MMOL/L (ref 135–145)
SP GR UR STRIP.AUTO: 1.03
TOXIC GRANULES BLD QL SMEAR: NORMAL
UROBILINOGEN UR STRIP.AUTO-MCNC: 0.2 MG/DL
WBC # BLD AUTO: 7.9 K/UL (ref 4.8–10.8)
WBC #/AREA URNS HPF: ABNORMAL /HPF

## 2018-12-12 PROCEDURE — 81001 URINALYSIS AUTO W/SCOPE: CPT

## 2018-12-12 PROCEDURE — 83735 ASSAY OF MAGNESIUM: CPT

## 2018-12-12 PROCEDURE — 36415 COLL VENOUS BLD VENIPUNCTURE: CPT

## 2018-12-12 PROCEDURE — 80053 COMPREHEN METABOLIC PANEL: CPT

## 2018-12-12 PROCEDURE — 87086 URINE CULTURE/COLONY COUNT: CPT

## 2018-12-12 PROCEDURE — 85007 BL SMEAR W/DIFF WBC COUNT: CPT

## 2018-12-12 PROCEDURE — 99285 EMERGENCY DEPT VISIT HI MDM: CPT

## 2018-12-12 PROCEDURE — 85055 RETICULATED PLATELET ASSAY: CPT

## 2018-12-12 PROCEDURE — 85027 COMPLETE CBC AUTOMATED: CPT

## 2018-12-12 ASSESSMENT — PAIN SCALES - GENERAL: PAINLEVEL_OUTOF10: 4

## 2018-12-13 ENCOUNTER — HOSPITAL ENCOUNTER (EMERGENCY)
Facility: MEDICAL CENTER | Age: 73
End: 2018-12-13
Attending: EMERGENCY MEDICINE
Payer: MEDICARE

## 2018-12-13 VITALS
HEART RATE: 103 BPM | HEIGHT: 61 IN | WEIGHT: 164.68 LBS | DIASTOLIC BLOOD PRESSURE: 65 MMHG | BODY MASS INDEX: 31.09 KG/M2 | OXYGEN SATURATION: 93 % | TEMPERATURE: 97.7 F | SYSTOLIC BLOOD PRESSURE: 135 MMHG | RESPIRATION RATE: 16 BRPM

## 2018-12-13 DIAGNOSIS — D69.6 THROMBOCYTOPENIA (HCC): ICD-10-CM

## 2018-12-13 LAB
ABO GROUP BLD: NORMAL
ALBUMIN SERPL BCP-MCNC: 4 G/DL (ref 3.2–4.9)
ALBUMIN/GLOB SERPL: 1.8 G/DL
ALP SERPL-CCNC: 103 U/L (ref 30–99)
ALT SERPL-CCNC: 9 U/L (ref 2–50)
ANION GAP SERPL CALC-SCNC: 10 MMOL/L (ref 0–11.9)
ANISOCYTOSIS BLD QL SMEAR: ABNORMAL
AST SERPL-CCNC: 14 U/L (ref 12–45)
BARCODED ABORH UBTYP: 5100
BARCODED PRD CODE UBPRD: NORMAL
BARCODED UNIT NUM UBUNT: NORMAL
BASOPHILS # BLD AUTO: 0 % (ref 0–1.8)
BASOPHILS # BLD: 0 K/UL (ref 0–0.12)
BILIRUB SERPL-MCNC: 0.3 MG/DL (ref 0.1–1.5)
BUN SERPL-MCNC: 27 MG/DL (ref 8–22)
CALCIUM SERPL-MCNC: 9.6 MG/DL (ref 8.5–10.5)
CHLORIDE SERPL-SCNC: 103 MMOL/L (ref 96–112)
CO2 SERPL-SCNC: 26 MMOL/L (ref 20–33)
COMPONENT P 8504P: NORMAL
CREAT SERPL-MCNC: 0.88 MG/DL (ref 0.5–1.4)
DOHLE BOD BLD QL SMEAR: NORMAL
EKG IMPRESSION: NORMAL
EOSINOPHIL # BLD AUTO: 0 K/UL (ref 0–0.51)
EOSINOPHIL NFR BLD: 0 % (ref 0–6.9)
ERYTHROCYTE [DISTWIDTH] IN BLOOD BY AUTOMATED COUNT: 46.9 FL (ref 35.9–50)
GLOBULIN SER CALC-MCNC: 2.2 G/DL (ref 1.9–3.5)
GLUCOSE SERPL-MCNC: 102 MG/DL (ref 65–99)
HCT VFR BLD AUTO: 26.3 % (ref 37–47)
HGB BLD-MCNC: 8.9 G/DL (ref 12–16)
LYMPHOCYTES # BLD AUTO: 3.22 K/UL (ref 1–4.8)
LYMPHOCYTES NFR BLD: 32.5 % (ref 22–41)
MANUAL DIFF BLD: NORMAL
MCH RBC QN AUTO: 31.6 PG (ref 27–33)
MCHC RBC AUTO-ENTMCNC: 33.8 G/DL (ref 33.6–35)
MCV RBC AUTO: 93.3 FL (ref 81.4–97.8)
METAMYELOCYTES NFR BLD MANUAL: 1.8 %
MICROCYTES BLD QL SMEAR: ABNORMAL
MONOCYTES # BLD AUTO: 0.26 K/UL (ref 0–0.85)
MONOCYTES NFR BLD AUTO: 2.6 % (ref 0–13.4)
MORPHOLOGY BLD-IMP: NORMAL
MYELOCYTES NFR BLD MANUAL: 0.9 %
NEUTROPHILS # BLD AUTO: 6.16 K/UL (ref 2–7.15)
NEUTROPHILS NFR BLD: 59.6 % (ref 44–72)
NEUTS BAND NFR BLD MANUAL: 2.6 % (ref 0–10)
NRBC # BLD AUTO: 0 K/UL
NRBC BLD-RTO: 0 /100 WBC
OVALOCYTES BLD QL SMEAR: NORMAL
PLATELET # BLD AUTO: 9 K/UL (ref 164–446)
PLATELET BLD QL SMEAR: NORMAL
PLATELETS.RETICULATED NFR BLD AUTO: 4.9 K/UL (ref 0.6–13.1)
PMV BLD AUTO: 13 FL (ref 9–12.9)
POIKILOCYTOSIS BLD QL SMEAR: NORMAL
POTASSIUM SERPL-SCNC: 3.8 MMOL/L (ref 3.6–5.5)
PRODUCT TYPE UPROD: NORMAL
PROT SERPL-MCNC: 6.2 G/DL (ref 6–8.2)
RBC # BLD AUTO: 2.82 M/UL (ref 4.2–5.4)
RBC BLD AUTO: PRESENT
RH BLD: NORMAL
SODIUM SERPL-SCNC: 139 MMOL/L (ref 135–145)
TOXIC GRANULES BLD QL SMEAR: NORMAL
UNIT STATUS USTAT: NORMAL
WBC # BLD AUTO: 9.9 K/UL (ref 4.8–10.8)

## 2018-12-13 PROCEDURE — 85055 RETICULATED PLATELET ASSAY: CPT

## 2018-12-13 PROCEDURE — 86901 BLOOD TYPING SEROLOGIC RH(D): CPT

## 2018-12-13 PROCEDURE — 86900 BLOOD TYPING SEROLOGIC ABO: CPT

## 2018-12-13 PROCEDURE — 700105 HCHG RX REV CODE 258: Performed by: EMERGENCY MEDICINE

## 2018-12-13 PROCEDURE — 80053 COMPREHEN METABOLIC PANEL: CPT

## 2018-12-13 PROCEDURE — 93005 ELECTROCARDIOGRAM TRACING: CPT | Performed by: EMERGENCY MEDICINE

## 2018-12-13 PROCEDURE — 85027 COMPLETE CBC AUTOMATED: CPT

## 2018-12-13 PROCEDURE — 85007 BL SMEAR W/DIFF WBC COUNT: CPT

## 2018-12-13 PROCEDURE — 36430 TRANSFUSION BLD/BLD COMPNT: CPT

## 2018-12-13 PROCEDURE — P9034 PLATELETS, PHERESIS: HCPCS

## 2018-12-13 PROCEDURE — 36415 COLL VENOUS BLD VENIPUNCTURE: CPT

## 2018-12-13 RX ORDER — SODIUM CHLORIDE 9 MG/ML
INJECTION, SOLUTION INTRAVENOUS CONTINUOUS
Status: DISCONTINUED | OUTPATIENT
Start: 2018-12-13 | End: 2018-12-13 | Stop reason: HOSPADM

## 2018-12-13 RX ADMIN — SODIUM CHLORIDE: 9 INJECTION, SOLUTION INTRAVENOUS at 03:21

## 2018-12-13 ASSESSMENT — PAIN SCALES - GENERAL
PAINLEVEL_OUTOF10: 0

## 2018-12-13 NOTE — PROGRESS NOTES
Discharging patient home. Verbalized understanding of discharge instructions, follow up appointments, and home care. All questions answered and all personal belongings with patient at time of discharge. Vital signs WNL.     Patient given discharge information papers and discharge assessment completed. Pt ambulatory to lobby with steady gait.

## 2018-12-13 NOTE — ED TRIAGE NOTES
Charlene Vincent  73 y.o.  female  Chief Complaint   Patient presents with   • Weakness   • Abnormal Labs     Present to triage states that she was sent here by her Oncology MD ( Dr. Thomas ) due to low platelet count. C/o weakness since Thursday after chemotherapy. Hx of Bladder CA. Afebrile in triage. Mask applied.

## 2018-12-13 NOTE — ED PROVIDER NOTES
ER Provider Note     Scribed for Jaquan Lee M.D. by El Val[place]jo. 12/13/2018, 12:18 AM.    Primary Care Provider: Anthony Oh M.D.  Means of Arrival: Walk in   History obtained from: Patient  History limited by: None     CHIEF COMPLAINT  Chief Complaint   Patient presents with   • Weakness   • Abnormal Labs       HPI  Charlene Vincent is a 73 y.o. female with bladder cancer who presents to the Emergency Department for evaluation of generalized weakness onset 1 week ago following her chemotherapy. The patient reports her weakness improved in severity yesterday so she tried to go shopping, but became easily fatigued at that time. She states she was recommended to come to the ED tonight after her Oncologist, Dr. Thomas, informed her recent lab work showed a low platelet count. The patient denies associated fever. She also reports no chest pain, hemoptysis, or cough. No alleviating or exacerbating factors are identified at this time.     REVIEW OF SYSTEMS  See HPI for further details. All other systems are negative.     PAST MEDICAL HISTORY   has a past medical history of Blood clotting disorder (HCC) (08/2018); Cancer (HCC) (08/2018); Cancer (HCC) (1980); High cholesterol; Hypertension; Renal disorder; and Urinary incontinence.    SURGICAL HISTORY   has a past surgical history that includes other (08/2018).    SOCIAL HISTORY  Social History   Substance Use Topics   • Smoking status: Former Smoker     Packs/day: 1.00     Years: 30.00     Types: Cigarettes     Quit date: 1/1/1983   • Smokeless tobacco: Never Used   • Alcohol use No      History   Drug Use No       FAMILY HISTORY  History reviewed. No pertinent family history.    CURRENT MEDICATIONS  Home Medications     Reviewed by Marty Ying R.N. (Registered Nurse) on 12/12/18 at 3955  Med List Status: Complete   Medication Last Dose Status   carvedilol (COREG) 12.5 MG Tab 12/12/2018 Active   lovastatin (MEVACOR) 20 MG Tab 12/12/2018 Active           "      ALLERGIES  No Known Allergies    PHYSICAL EXAM  VITAL SIGNS: /78   Pulse (!) 109   Temp 37 °C (98.6 °F) (Temporal)   Resp 17   Ht 1.549 m (5' 1\")   Wt 74.7 kg (164 lb 10.9 oz)   SpO2 91%   BMI 31.12 kg/m²      Constitutional: Alert in no apparent distress.  HENT: No signs of trauma, Bilateral external ears normal, Nose normal.   Eyes: Pupils are equal and reactive, Conjunctiva normal, Non-icteric.   Neck: Normal range of motion, No tenderness, Supple, No stridor.   Lymphatic: No lymphadenopathy noted.   Cardiovascular: Regular rate and rhythm, no murmurs.   Thorax & Lungs: Normal breath sounds, No respiratory distress, No wheezing, No chest tenderness.   Abdomen: Bowel sounds normal, Soft, No tenderness, No masses, No pulsatile masses. No peritoneal signs.  Skin: Warm, Dry, No erythema, No rash.   Back: No bony tenderness, No CVA tenderness.   Extremities: Intact distal pulses, No edema, No tenderness, No cyanosis.  Musculoskeletal: Good range of motion in all major joints. No tenderness to palpation or major deformities noted.   Neurologic: Alert , Normal motor function, Normal sensory function, No focal deficits noted.   Psychiatric: Affect normal, Judgment normal, Mood normal.       DIAGNOSTIC STUDIES / PROCEDURES    EKG Interpretation:  Interpreted by me    12 Lead EKG interpreted by me to show:  Normal sinus rhythm  Rate 98  Axis: Normal  Intervals: Normal  Normal T waves  Normal ST segments  My impression of this EKG: Does not indicate ischemia or arrhythmia at this time.     LABS  Labs Reviewed   CBC WITH DIFFERENTIAL - Abnormal; Notable for the following:        Result Value    RBC 2.82 (*)     Hemoglobin 8.9 (*)     Hematocrit 26.3 (*)     Platelet Count 9 (*)     MPV 13.0 (*)     All other components within normal limits   COMP METABOLIC PANEL - Abnormal; Notable for the following:     Glucose 102 (*)     Bun 27 (*)     Alkaline Phosphatase 103 (*)     All other components within normal " limits   ESTIMATED GFR   DIFFERENTIAL MANUAL   PERIPHERAL SMEAR REVIEW   PLATELET ESTIMATE   MORPHOLOGY   IMMATURE PLT FRACTION   ABO AND RH DETERMINATION   PLATELETS REQUEST   RELEASE PLATELET PHERESIS   TRANSFUSE PLATELET PHERESIS-NURSING COMMUNICATION     All labs reviewed by me.    COURSE & MEDICAL DECISION MAKING  Pertinent Labs & Imaging studies reviewed. (See chart for details)    This is a 73 y.o. female that presents with thrombocytopenia secondary to chemotherapy.  The patient is feeling generally weak however she is feeling better today and yesterday than prior.  My plan is to repeat the patient's platelet count.  An EKG was performed which shows no ischemia.  I will consult her oncologist as well.    12:14 AM - Obtained and reviewed past medical records that indicate patient has recent lab work done earlier today that showed a platelet count of 14.     12:18 AM - Patient seen and examined at bedside. Ordered Estiamted GFR, Differential Manual, Peripheral smear review, Platelet estimate, morphology, immature PLT fraction, CBC with differential, CMP, and EKG.  She was informed blood work has been ordered and we will await results to further determine plan of care.     1:43 AM - Reviewed patient's lab results that indicates a platelet count of 9. Paged Oncologist.     1:54 AM - Consult with Dr. Denson, Oncology, who was updated on patient's down trending platelet count. He will follow up with the patient in office on Friday.  He recommends a platelet infusion as well.    The patient will return for new or worsening symptoms and is stable at the time of discharge.    DISPOSITION:  Patient will be discharged home in stable condition.    FOLLOW UP:  Mauri Denson M.D.  [address] [facility] [address]  [phone]    In 1 day        OUTPATIENT MEDICATIONS:  New Prescriptions    No medications on file       FINAL IMPRESSION  1. Thrombocytopenia (HCC)          El WONG (Scribe), am scribing for, and in  the presence of, Jaquan Lee M.D..    Electronically signed by: El Vallejo (Scribe), 12/13/2018    I, Jaquan Lee M.D. personally performed the services described in this documentation, as scribed by El Vallejo in my presence, and it is both accurate and complete.     C.    The note accurately reflects work and decisions made by me.  Jaquan Lee  12/13/2018  3:28 AM

## 2018-12-14 ENCOUNTER — HOSPITAL ENCOUNTER (OUTPATIENT)
Dept: RADIOLOGY | Facility: MEDICAL CENTER | Age: 73
End: 2018-12-14
Attending: INTERNAL MEDICINE
Payer: MEDICARE

## 2018-12-14 DIAGNOSIS — M79.605 PAIN AND SWELLING OF LOWER EXTREMITY, LEFT: ICD-10-CM

## 2018-12-14 DIAGNOSIS — M79.89 PAIN AND SWELLING OF LOWER EXTREMITY, LEFT: ICD-10-CM

## 2018-12-14 DIAGNOSIS — C67.4 MALIGNANT NEOPLASM OF POSTERIOR WALL OF URINARY BLADDER (HCC): ICD-10-CM

## 2018-12-14 LAB
BACTERIA UR CULT: NORMAL
SIGNIFICANT IND 70042: NORMAL
SITE SITE: NORMAL
SOURCE SOURCE: NORMAL

## 2018-12-14 PROCEDURE — 93971 EXTREMITY STUDY: CPT | Mod: LT

## 2019-01-10 ENCOUNTER — HOSPITAL ENCOUNTER (OUTPATIENT)
Dept: RADIOLOGY | Facility: MEDICAL CENTER | Age: 74
End: 2019-01-10
Attending: INTERNAL MEDICINE
Payer: MEDICARE

## 2019-01-10 DIAGNOSIS — C67.4 MALIGNANT NEOPLASM OF POSTERIOR WALL OF URINARY BLADDER (HCC): ICD-10-CM

## 2019-01-10 PROCEDURE — 71260 CT THORAX DX C+: CPT

## 2019-01-10 PROCEDURE — 700111 HCHG RX REV CODE 636 W/ 250 OVERRIDE (IP)

## 2019-01-10 PROCEDURE — 700117 HCHG RX CONTRAST REV CODE 255: Performed by: INTERNAL MEDICINE

## 2019-01-10 RX ADMIN — IOHEXOL 50 ML: 240 INJECTION, SOLUTION INTRATHECAL; INTRAVASCULAR; INTRAVENOUS; ORAL at 14:09

## 2019-01-10 RX ADMIN — HEPARIN 500 UNITS: 100 SYRINGE at 14:05

## 2019-01-10 RX ADMIN — IOHEXOL 100 ML: 350 INJECTION, SOLUTION INTRAVENOUS at 13:19

## 2019-01-10 NOTE — PROCEDURES
Right chest port-a-cath accessed per protocol without difficulty at 1400.    Right chest port-a-cath de-accessed per protocol without difficulty at 1405. See MAR.

## 2019-03-04 ENCOUNTER — HOSPITAL ENCOUNTER (OUTPATIENT)
Dept: RADIOLOGY | Facility: MEDICAL CENTER | Age: 74
DRG: 657 | End: 2019-03-04
Attending: UROLOGY | Admitting: UROLOGY
Payer: MEDICARE

## 2019-03-04 DIAGNOSIS — Z01.811 PRE-OPERATIVE RESPIRATORY EXAMINATION: ICD-10-CM

## 2019-03-04 DIAGNOSIS — Z01.810 PRE-OPERATIVE CARDIOVASCULAR EXAMINATION: ICD-10-CM

## 2019-03-04 DIAGNOSIS — Z01.812 PRE-OPERATIVE LABORATORY EXAMINATION: ICD-10-CM

## 2019-03-04 LAB
ABO GROUP BLD: NORMAL
ALBUMIN SERPL BCP-MCNC: 4 G/DL (ref 3.2–4.9)
ALBUMIN/GLOB SERPL: 1.5 G/DL
ALP SERPL-CCNC: 63 U/L (ref 30–99)
ALT SERPL-CCNC: 9 U/L (ref 2–50)
ANION GAP SERPL CALC-SCNC: 8 MMOL/L (ref 0–11.9)
APPEARANCE UR: CLEAR
APTT PPP: 27.9 SEC (ref 24.7–36)
AST SERPL-CCNC: 14 U/L (ref 12–45)
BACTERIA #/AREA URNS HPF: NEGATIVE /HPF
BASOPHILS # BLD AUTO: 0.7 % (ref 0–1.8)
BASOPHILS # BLD: 0.03 K/UL (ref 0–0.12)
BILIRUB SERPL-MCNC: 0.4 MG/DL (ref 0.1–1.5)
BILIRUB UR QL STRIP.AUTO: NEGATIVE
BLD GP AB SCN SERPL QL: NORMAL
BUN SERPL-MCNC: 19 MG/DL (ref 8–22)
CALCIUM SERPL-MCNC: 9.2 MG/DL (ref 8.5–10.5)
CHLORIDE SERPL-SCNC: 106 MMOL/L (ref 96–112)
CO2 SERPL-SCNC: 28 MMOL/L (ref 20–33)
COLOR UR: YELLOW
CREAT SERPL-MCNC: 0.84 MG/DL (ref 0.5–1.4)
EKG IMPRESSION: NORMAL
EOSINOPHIL # BLD AUTO: 0.09 K/UL (ref 0–0.51)
EOSINOPHIL NFR BLD: 2 % (ref 0–6.9)
EPI CELLS #/AREA URNS HPF: NEGATIVE /HPF
ERYTHROCYTE [DISTWIDTH] IN BLOOD BY AUTOMATED COUNT: 39.5 FL (ref 35.9–50)
GLOBULIN SER CALC-MCNC: 2.6 G/DL (ref 1.9–3.5)
GLUCOSE SERPL-MCNC: 107 MG/DL (ref 65–99)
GLUCOSE UR STRIP.AUTO-MCNC: NEGATIVE MG/DL
HCT VFR BLD AUTO: 39.8 % (ref 37–47)
HGB BLD-MCNC: 13.6 G/DL (ref 12–16)
HYALINE CASTS #/AREA URNS LPF: NORMAL /LPF
IMM GRANULOCYTES # BLD AUTO: 0.01 K/UL (ref 0–0.11)
IMM GRANULOCYTES NFR BLD AUTO: 0.2 % (ref 0–0.9)
INR PPP: 1.06 (ref 0.87–1.13)
KETONES UR STRIP.AUTO-MCNC: NEGATIVE MG/DL
LEUKOCYTE ESTERASE UR QL STRIP.AUTO: ABNORMAL
LYMPHOCYTES # BLD AUTO: 1.58 K/UL (ref 1–4.8)
LYMPHOCYTES NFR BLD: 34.9 % (ref 22–41)
MCH RBC QN AUTO: 33.3 PG (ref 27–33)
MCHC RBC AUTO-ENTMCNC: 34.2 G/DL (ref 33.6–35)
MCV RBC AUTO: 97.5 FL (ref 81.4–97.8)
MICRO URNS: ABNORMAL
MONOCYTES # BLD AUTO: 0.52 K/UL (ref 0–0.85)
MONOCYTES NFR BLD AUTO: 11.5 % (ref 0–13.4)
NEUTROPHILS # BLD AUTO: 2.3 K/UL (ref 2–7.15)
NEUTROPHILS NFR BLD: 50.7 % (ref 44–72)
NITRITE UR QL STRIP.AUTO: NEGATIVE
NRBC # BLD AUTO: 0 K/UL
NRBC BLD-RTO: 0 /100 WBC
PH UR STRIP.AUTO: 6.5 [PH]
PLATELET # BLD AUTO: 152 K/UL (ref 164–446)
PMV BLD AUTO: 9.4 FL (ref 9–12.9)
POTASSIUM SERPL-SCNC: 3.8 MMOL/L (ref 3.6–5.5)
PROT SERPL-MCNC: 6.6 G/DL (ref 6–8.2)
PROT UR QL STRIP: NEGATIVE MG/DL
PROTHROMBIN TIME: 14 SEC (ref 12–14.6)
RBC # BLD AUTO: 4.08 M/UL (ref 4.2–5.4)
RBC # URNS HPF: NORMAL /HPF
RBC UR QL AUTO: NEGATIVE
RH BLD: NORMAL
SODIUM SERPL-SCNC: 142 MMOL/L (ref 135–145)
SP GR UR STRIP.AUTO: 1.01
UROBILINOGEN UR STRIP.AUTO-MCNC: 0.2 MG/DL
WBC # BLD AUTO: 4.5 K/UL (ref 4.8–10.8)
WBC #/AREA URNS HPF: NORMAL /HPF

## 2019-03-04 PROCEDURE — 93010 ELECTROCARDIOGRAM REPORT: CPT | Performed by: INTERNAL MEDICINE

## 2019-03-04 PROCEDURE — 93005 ELECTROCARDIOGRAM TRACING: CPT

## 2019-03-04 PROCEDURE — 86850 RBC ANTIBODY SCREEN: CPT

## 2019-03-04 PROCEDURE — 80053 COMPREHEN METABOLIC PANEL: CPT

## 2019-03-04 PROCEDURE — 81001 URINALYSIS AUTO W/SCOPE: CPT

## 2019-03-04 PROCEDURE — 87086 URINE CULTURE/COLONY COUNT: CPT

## 2019-03-04 PROCEDURE — 71046 X-RAY EXAM CHEST 2 VIEWS: CPT

## 2019-03-04 PROCEDURE — 36415 COLL VENOUS BLD VENIPUNCTURE: CPT

## 2019-03-04 PROCEDURE — 85730 THROMBOPLASTIN TIME PARTIAL: CPT

## 2019-03-04 PROCEDURE — 85025 COMPLETE CBC W/AUTO DIFF WBC: CPT

## 2019-03-04 PROCEDURE — 85610 PROTHROMBIN TIME: CPT

## 2019-03-04 PROCEDURE — 86901 BLOOD TYPING SEROLOGIC RH(D): CPT

## 2019-03-04 PROCEDURE — 86900 BLOOD TYPING SEROLOGIC ABO: CPT

## 2019-03-06 LAB
BACTERIA UR CULT: NORMAL
SIGNIFICANT IND 70042: NORMAL
SITE SITE: NORMAL
SOURCE SOURCE: NORMAL

## 2019-03-07 ENCOUNTER — HOSPITAL ENCOUNTER (INPATIENT)
Facility: MEDICAL CENTER | Age: 74
LOS: 5 days | DRG: 657 | End: 2019-03-12
Attending: UROLOGY | Admitting: UROLOGY
Payer: MEDICARE

## 2019-03-07 DIAGNOSIS — Z43.6 ATTENTION TO UROSTOMY (HCC): Primary | ICD-10-CM

## 2019-03-07 DIAGNOSIS — G89.18 POST-OP PAIN: ICD-10-CM

## 2019-03-07 LAB
ANION GAP SERPL CALC-SCNC: 4 MMOL/L (ref 0–11.9)
BUN SERPL-MCNC: 15 MG/DL (ref 8–22)
CALCIUM SERPL-MCNC: 7.8 MG/DL (ref 8.5–10.5)
CHLORIDE SERPL-SCNC: 113 MMOL/L (ref 96–112)
CO2 SERPL-SCNC: 24 MMOL/L (ref 20–33)
CREAT SERPL-MCNC: 0.89 MG/DL (ref 0.5–1.4)
ERYTHROCYTE [DISTWIDTH] IN BLOOD BY AUTOMATED COUNT: 41.1 FL (ref 35.9–50)
GLUCOSE SERPL-MCNC: 186 MG/DL (ref 65–99)
HCT VFR BLD AUTO: 32.3 % (ref 37–47)
HGB BLD-MCNC: 10.8 G/DL (ref 12–16)
MCH RBC QN AUTO: 33.4 PG (ref 27–33)
MCHC RBC AUTO-ENTMCNC: 33.4 G/DL (ref 33.6–35)
MCV RBC AUTO: 100 FL (ref 81.4–97.8)
PATHOLOGY CONSULT NOTE: NORMAL
PLATELET # BLD AUTO: 110 K/UL (ref 164–446)
PMV BLD AUTO: 9.1 FL (ref 9–12.9)
POTASSIUM SERPL-SCNC: 3.8 MMOL/L (ref 3.6–5.5)
RBC # BLD AUTO: 3.23 M/UL (ref 4.2–5.4)
SODIUM SERPL-SCNC: 141 MMOL/L (ref 135–145)
WBC # BLD AUTO: 9.8 K/UL (ref 4.8–10.8)

## 2019-03-07 PROCEDURE — 84295 ASSAY OF SERUM SODIUM: CPT

## 2019-03-07 PROCEDURE — 160002 HCHG RECOVERY MINUTES (STAT): Performed by: UROLOGY

## 2019-03-07 PROCEDURE — 500424 HCHG DRESSING, AIRSTRIP: Performed by: UROLOGY

## 2019-03-07 PROCEDURE — 88309 TISSUE EXAM BY PATHOLOGIST: CPT

## 2019-03-07 PROCEDURE — 110454 HCHG SHELL REV 250: Performed by: UROLOGY

## 2019-03-07 PROCEDURE — 160009 HCHG ANES TIME/MIN: Performed by: UROLOGY

## 2019-03-07 PROCEDURE — 700111 HCHG RX REV CODE 636 W/ 250 OVERRIDE (IP)

## 2019-03-07 PROCEDURE — 88305 TISSUE EXAM BY PATHOLOGIST: CPT | Mod: 59

## 2019-03-07 PROCEDURE — 160035 HCHG PACU - 1ST 60 MINS PHASE I: Performed by: UROLOGY

## 2019-03-07 PROCEDURE — 160048 HCHG OR STATISTICAL LEVEL 1-5: Performed by: UROLOGY

## 2019-03-07 PROCEDURE — A4314 CATH W/DRAINAGE 2-WAY LATEX: HCPCS | Performed by: UROLOGY

## 2019-03-07 PROCEDURE — A6404 STERILE GAUZE > 48 SQ IN: HCPCS | Performed by: UROLOGY

## 2019-03-07 PROCEDURE — 500389 HCHG DRAIN, RESERVOIR SUCT JP 100CC: Performed by: UROLOGY

## 2019-03-07 PROCEDURE — 07TC0ZZ RESECTION OF PELVIS LYMPHATIC, OPEN APPROACH: ICD-10-PCS | Performed by: UROLOGY

## 2019-03-07 PROCEDURE — 84132 ASSAY OF SERUM POTASSIUM: CPT

## 2019-03-07 PROCEDURE — 501435 HCHG STAPLER, LINEAR 60: Performed by: UROLOGY

## 2019-03-07 PROCEDURE — A6402 STERILE GAUZE <= 16 SQ IN: HCPCS | Performed by: UROLOGY

## 2019-03-07 PROCEDURE — C1758 CATHETER, URETERAL: HCPCS | Performed by: UROLOGY

## 2019-03-07 PROCEDURE — 160036 HCHG PACU - EA ADDL 30 MINS PHASE I: Performed by: UROLOGY

## 2019-03-07 PROCEDURE — 88331 PATH CONSLTJ SURG 1 BLK 1SPC: CPT | Mod: 91

## 2019-03-07 PROCEDURE — 82947 ASSAY GLUCOSE BLOOD QUANT: CPT

## 2019-03-07 PROCEDURE — 700105 HCHG RX REV CODE 258: Performed by: UROLOGY

## 2019-03-07 PROCEDURE — 80048 BASIC METABOLIC PNL TOTAL CA: CPT

## 2019-03-07 PROCEDURE — P9045 ALBUMIN (HUMAN), 5%, 250 ML: HCPCS | Mod: JG

## 2019-03-07 PROCEDURE — 502704 HCHG DEVICE, LIGASURE IMPACT: Performed by: UROLOGY

## 2019-03-07 PROCEDURE — 82330 ASSAY OF CALCIUM: CPT

## 2019-03-07 PROCEDURE — A9270 NON-COVERED ITEM OR SERVICE: HCPCS | Performed by: UROLOGY

## 2019-03-07 PROCEDURE — 160031 HCHG SURGERY MINUTES - 1ST 30 MINS LEVEL 5: Performed by: UROLOGY

## 2019-03-07 PROCEDURE — 82803 BLOOD GASES ANY COMBINATION: CPT

## 2019-03-07 PROCEDURE — 500371 HCHG DRAIN, BLAKE 10MM: Performed by: UROLOGY

## 2019-03-07 PROCEDURE — 700102 HCHG RX REV CODE 250 W/ 637 OVERRIDE(OP): Performed by: UROLOGY

## 2019-03-07 PROCEDURE — 700111 HCHG RX REV CODE 636 W/ 250 OVERRIDE (IP): Performed by: ANESTHESIOLOGY

## 2019-03-07 PROCEDURE — 85014 HEMATOCRIT: CPT

## 2019-03-07 PROCEDURE — 501411 HCHG SPONGE, BABY LAP W/O RINGS: Performed by: UROLOGY

## 2019-03-07 PROCEDURE — 160042 HCHG SURGERY MINUTES - EA ADDL 1 MIN LEVEL 5: Performed by: UROLOGY

## 2019-03-07 PROCEDURE — 85027 COMPLETE CBC AUTOMATED: CPT

## 2019-03-07 PROCEDURE — 07BC0ZX EXCISION OF PELVIS LYMPHATIC, OPEN APPROACH, DIAGNOSTIC: ICD-10-PCS | Performed by: UROLOGY

## 2019-03-07 PROCEDURE — C2617 STENT, NON-COR, TEM W/O DEL: HCPCS | Performed by: UROLOGY

## 2019-03-07 PROCEDURE — 88307 TISSUE EXAM BY PATHOLOGIST: CPT | Mod: 59

## 2019-03-07 PROCEDURE — 700101 HCHG RX REV CODE 250

## 2019-03-07 PROCEDURE — 770006 HCHG ROOM/CARE - MED/SURG/GYN SEMI*

## 2019-03-07 PROCEDURE — 700101 HCHG RX REV CODE 250: Performed by: UROLOGY

## 2019-03-07 PROCEDURE — 36415 COLL VENOUS BLD VENIPUNCTURE: CPT

## 2019-03-07 PROCEDURE — 501460 HCHG STAPLER, TA55 35LD: Performed by: UROLOGY

## 2019-03-07 PROCEDURE — 501838 HCHG SUTURE GENERAL: Performed by: UROLOGY

## 2019-03-07 PROCEDURE — 501461: Performed by: UROLOGY

## 2019-03-07 PROCEDURE — 502000 HCHG MISC OR IMPLANTS RC 0278: Performed by: UROLOGY

## 2019-03-07 PROCEDURE — 0T180ZC BYPASS BILATERAL URETERS TO ILEOCUTANEOUS, OPEN APPROACH: ICD-10-PCS | Performed by: UROLOGY

## 2019-03-07 PROCEDURE — 501445 HCHG STAPLER, SKIN DISP: Performed by: UROLOGY

## 2019-03-07 PROCEDURE — 700111 HCHG RX REV CODE 636 W/ 250 OVERRIDE (IP): Performed by: UROLOGY

## 2019-03-07 DEVICE — IMPLANTABLE DEVICE: Type: IMPLANTABLE DEVICE | Status: FUNCTIONAL

## 2019-03-07 RX ORDER — DEXTROSE MONOHYDRATE, SODIUM CHLORIDE, AND POTASSIUM CHLORIDE 50; 1.49; 4.5 G/1000ML; G/1000ML; G/1000ML
INJECTION, SOLUTION INTRAVENOUS EVERY 6 HOURS
Status: COMPLETED | OUTPATIENT
Start: 2019-03-07 | End: 2019-03-08

## 2019-03-07 RX ORDER — HYDROMORPHONE HYDROCHLORIDE 1 MG/ML
0.4 INJECTION, SOLUTION INTRAMUSCULAR; INTRAVENOUS; SUBCUTANEOUS
Status: DISCONTINUED | OUTPATIENT
Start: 2019-03-07 | End: 2019-03-07 | Stop reason: HOSPADM

## 2019-03-07 RX ORDER — HYDROMORPHONE HYDROCHLORIDE 1 MG/ML
0.1 INJECTION, SOLUTION INTRAMUSCULAR; INTRAVENOUS; SUBCUTANEOUS
Status: DISCONTINUED | OUTPATIENT
Start: 2019-03-07 | End: 2019-03-07 | Stop reason: HOSPADM

## 2019-03-07 RX ORDER — KETOROLAC TROMETHAMINE 30 MG/ML
INJECTION, SOLUTION INTRAMUSCULAR; INTRAVENOUS
Status: COMPLETED
Start: 2019-03-07 | End: 2019-03-07

## 2019-03-07 RX ORDER — HALOPERIDOL 5 MG/ML
1 INJECTION INTRAMUSCULAR EVERY 6 HOURS PRN
Status: DISCONTINUED | OUTPATIENT
Start: 2019-03-07 | End: 2019-03-12 | Stop reason: HOSPADM

## 2019-03-07 RX ORDER — SCOLOPAMINE TRANSDERMAL SYSTEM 1 MG/1
1 PATCH, EXTENDED RELEASE TRANSDERMAL
Status: DISCONTINUED | OUTPATIENT
Start: 2019-03-07 | End: 2019-03-12 | Stop reason: HOSPADM

## 2019-03-07 RX ORDER — CEFAZOLIN SODIUM 1 G/50ML
1 INJECTION, SOLUTION INTRAVENOUS EVERY 8 HOURS
Status: DISCONTINUED | OUTPATIENT
Start: 2019-03-07 | End: 2019-03-12 | Stop reason: HOSPADM

## 2019-03-07 RX ORDER — SODIUM CHLORIDE, SODIUM LACTATE, POTASSIUM CHLORIDE, CALCIUM CHLORIDE 600; 310; 30; 20 MG/100ML; MG/100ML; MG/100ML; MG/100ML
INJECTION, SOLUTION INTRAVENOUS ONCE
Status: COMPLETED | OUTPATIENT
Start: 2019-03-07 | End: 2019-03-08

## 2019-03-07 RX ORDER — DIPHENHYDRAMINE HYDROCHLORIDE 50 MG/ML
25 INJECTION INTRAMUSCULAR; INTRAVENOUS EVERY 6 HOURS PRN
Status: DISCONTINUED | OUTPATIENT
Start: 2019-03-07 | End: 2019-03-12 | Stop reason: HOSPADM

## 2019-03-07 RX ORDER — ACETAMINOPHEN 650 MG/1
650 SUPPOSITORY RECTAL EVERY 6 HOURS
Status: DISCONTINUED | OUTPATIENT
Start: 2019-03-07 | End: 2019-03-08

## 2019-03-07 RX ORDER — HYDROMORPHONE HYDROCHLORIDE 1 MG/ML
0.2 INJECTION, SOLUTION INTRAMUSCULAR; INTRAVENOUS; SUBCUTANEOUS
Status: DISCONTINUED | OUTPATIENT
Start: 2019-03-07 | End: 2019-03-07 | Stop reason: HOSPADM

## 2019-03-07 RX ORDER — ATORVASTATIN CALCIUM 20 MG/1
20 TABLET, FILM COATED ORAL NIGHTLY
Status: DISCONTINUED | OUTPATIENT
Start: 2019-03-07 | End: 2019-03-12 | Stop reason: HOSPADM

## 2019-03-07 RX ORDER — DEXAMETHASONE SODIUM PHOSPHATE 4 MG/ML
4 INJECTION, SOLUTION INTRA-ARTICULAR; INTRALESIONAL; INTRAMUSCULAR; INTRAVENOUS; SOFT TISSUE
Status: COMPLETED | OUTPATIENT
Start: 2019-03-07 | End: 2019-03-09

## 2019-03-07 RX ORDER — BUPIVACAINE HYDROCHLORIDE AND EPINEPHRINE 5; 5 MG/ML; UG/ML
INJECTION, SOLUTION EPIDURAL; INTRACAUDAL; PERINEURAL
Status: DISCONTINUED | OUTPATIENT
Start: 2019-03-07 | End: 2019-03-07 | Stop reason: HOSPADM

## 2019-03-07 RX ORDER — OXYCODONE HYDROCHLORIDE 5 MG/1
2.5 TABLET ORAL
Status: DISCONTINUED | OUTPATIENT
Start: 2019-03-07 | End: 2019-03-12 | Stop reason: HOSPADM

## 2019-03-07 RX ORDER — MEPERIDINE HYDROCHLORIDE 25 MG/ML
12.5 INJECTION INTRAMUSCULAR; INTRAVENOUS; SUBCUTANEOUS
Status: DISCONTINUED | OUTPATIENT
Start: 2019-03-07 | End: 2019-03-07 | Stop reason: HOSPADM

## 2019-03-07 RX ORDER — KETOROLAC TROMETHAMINE 30 MG/ML
15 INJECTION, SOLUTION INTRAMUSCULAR; INTRAVENOUS EVERY 6 HOURS
Status: DISPENSED | OUTPATIENT
Start: 2019-03-07 | End: 2019-03-10

## 2019-03-07 RX ORDER — CARVEDILOL 6.25 MG/1
12.5 TABLET ORAL 2 TIMES DAILY WITH MEALS
Status: DISCONTINUED | OUTPATIENT
Start: 2019-03-07 | End: 2019-03-12 | Stop reason: HOSPADM

## 2019-03-07 RX ORDER — ONDANSETRON 2 MG/ML
4 INJECTION INTRAMUSCULAR; INTRAVENOUS EVERY 4 HOURS PRN
Status: DISCONTINUED | OUTPATIENT
Start: 2019-03-07 | End: 2019-03-12 | Stop reason: HOSPADM

## 2019-03-07 RX ORDER — HALOPERIDOL 5 MG/ML
1 INJECTION INTRAMUSCULAR
Status: DISCONTINUED | OUTPATIENT
Start: 2019-03-07 | End: 2019-03-07 | Stop reason: HOSPADM

## 2019-03-07 RX ORDER — ATORVASTATIN CALCIUM 20 MG/1
20 TABLET, FILM COATED ORAL NIGHTLY
COMMUNITY

## 2019-03-07 RX ORDER — ONDANSETRON 2 MG/ML
4 INJECTION INTRAMUSCULAR; INTRAVENOUS
Status: COMPLETED | OUTPATIENT
Start: 2019-03-07 | End: 2019-03-07

## 2019-03-07 RX ORDER — ALBUMIN, HUMAN INJ 5% 5 %
12.5 SOLUTION INTRAVENOUS PRN
Status: DISCONTINUED | OUTPATIENT
Start: 2019-03-07 | End: 2019-03-07 | Stop reason: HOSPADM

## 2019-03-07 RX ORDER — MIDAZOLAM HYDROCHLORIDE 1 MG/ML
INJECTION INTRAMUSCULAR; INTRAVENOUS
Status: DISPENSED
Start: 2019-03-07 | End: 2019-03-07

## 2019-03-07 RX ADMIN — KETOROLAC TROMETHAMINE 15 MG: 30 INJECTION, SOLUTION INTRAMUSCULAR at 15:49

## 2019-03-07 RX ADMIN — ATORVASTATIN CALCIUM 20 MG: 20 TABLET, FILM COATED ORAL at 21:02

## 2019-03-07 RX ADMIN — CARVEDILOL 12.5 MG: 12.5 TABLET, FILM COATED ORAL at 17:39

## 2019-03-07 RX ADMIN — SCOPALAMINE 1 PATCH: 1 PATCH, EXTENDED RELEASE TRANSDERMAL at 18:54

## 2019-03-07 RX ADMIN — KETOROLAC TROMETHAMINE 15 MG: 30 INJECTION, SOLUTION INTRAMUSCULAR; INTRAVENOUS at 15:49

## 2019-03-07 RX ADMIN — SODIUM CHLORIDE, SODIUM LACTATE, POTASSIUM CHLORIDE, CALCIUM CHLORIDE: 600; 310; 30; 20 INJECTION, SOLUTION INTRAVENOUS at 06:51

## 2019-03-07 RX ADMIN — KETOROLAC TROMETHAMINE 15 MG: 30 INJECTION, SOLUTION INTRAMUSCULAR; INTRAVENOUS at 21:01

## 2019-03-07 RX ADMIN — POTASSIUM CHLORIDE, DEXTROSE MONOHYDRATE AND SODIUM CHLORIDE 125 ML: 150; 5; 450 INJECTION, SOLUTION INTRAVENOUS at 15:37

## 2019-03-07 RX ADMIN — ONDANSETRON 4 MG: 2 INJECTION INTRAMUSCULAR; INTRAVENOUS at 18:47

## 2019-03-07 RX ADMIN — ONDANSETRON 4 MG: 2 INJECTION INTRAMUSCULAR; INTRAVENOUS at 15:33

## 2019-03-07 RX ADMIN — CEFAZOLIN SODIUM 1 G: 1 INJECTION, SOLUTION INTRAVENOUS at 17:40

## 2019-03-07 RX ADMIN — LIDOCAINE HYDROCHLORIDE 0.5 ML: 10 INJECTION, SOLUTION EPIDURAL; INFILTRATION; INTRACAUDAL; PERINEURAL at 06:51

## 2019-03-07 RX ADMIN — CEFAZOLIN SODIUM 1 G: 1 INJECTION, SOLUTION INTRAVENOUS at 22:19

## 2019-03-07 ASSESSMENT — COGNITIVE AND FUNCTIONAL STATUS - GENERAL
DAILY ACTIVITIY SCORE: 24
SUGGESTED CMS G CODE MODIFIER MOBILITY: CH
MOBILITY SCORE: 24
SUGGESTED CMS G CODE MODIFIER DAILY ACTIVITY: CH

## 2019-03-07 ASSESSMENT — PATIENT HEALTH QUESTIONNAIRE - PHQ9
SUM OF ALL RESPONSES TO PHQ9 QUESTIONS 1 AND 2: 0
1. LITTLE INTEREST OR PLEASURE IN DOING THINGS: NOT AT ALL
2. FEELING DOWN, DEPRESSED, IRRITABLE, OR HOPELESS: NOT AT ALL

## 2019-03-07 ASSESSMENT — LIFESTYLE VARIABLES
ALCOHOL_USE: NO
EVER_SMOKED: YES

## 2019-03-07 NOTE — OR SURGEON
Immediate Post OP Note    PreOp Diagnosis: Muscle invasive bladder cancer                                History of neoadjuvant chemotherapy    PostOp Diagnosis: As above    Procedure(s):  ANTERIOR PELVIC EXENTERATION- Wound Class: Clean Contaminated  EXTENDED PELVIC LYMPHADENECTOMY - Wound Class: Clean Contaminated  ILEAL CONDUIT LOOP URINARY DIVERSION - Wound Class: Clean Contaminated    Surgeon(s):  STCAIA Lowe M.D.    Anesthesiologist/Type of Anesthesia:  Anesthesiologist: Jayesh Barton M.D./General ETT    Surgical Staff:  Circulator: Jodi Lucio R.N.  Relief Circulator: Bharath Victoria R.N.; Neeta Coon R.N.  Relief Scrub: Sandy May  Scrub Person: Graciela Blanco; Dileep Silva    Specimens removed if any:  ID Type Source Tests Collected by Time Destination   A : Distal Right Ureter Other Other PATHOLOGY SPECIMEN Jaquan Ritter M.D. 3/7/2019  8:51 AM    B : LEFT DISTAL URETER Tissue Ureter PATHOLOGY SPECIMEN Jaquan Ritter M.D. 3/7/2019  9:08 AM    C : RIGHT PELVIC LYMPH NODES Tissue Lymph Node PATHOLOGY SPECIMEN Jaquan Ritter M.D. 3/7/2019  9:51 AM    D : BLADDER, UTERUS, BILATERAL TUBES AND OVARIES Other Other PATHOLOGY SPECIMEN Jaquan Ritter M.D. 3/7/2019 10:32 AM    E : LEFT PELVIC LYMPH NODES Tissue Lymph Node PATHOLOGY SPECIMEN Jaquan Ritter M.D. 3/7/2019 11:23 AM    F : LEFT DISTAL URETER - FINAL MARGIN Tissue Ureter PATHOLOGY SPECIMEN Jaquan Ritter M.D. 3/7/2019 11:53 AM    G : DISTAL RIGHT URETER - FINAL MARGIN Tissue Ureter PATHOLOGY SPECIMEN Jaquan Ritter M.D. 3/7/2019 12:10 PM        Estimated Blood Loss: 500ml    Findings: Normal ovaries, uterus and cervix. No obvious extension beyond the bladder and retrocecal appendix    Complications: None  Drains: 6 Bangladeshi urinary diversion stent in the left and right ureters               10mm flat fluted EVER drain to bulb suction        3/7/2019 1:39 PM Jaquan Ritter M.D.

## 2019-03-08 LAB
ACTION RANGE TRIGGERED IACRT: NO
BASE EXCESS BLDA CALC-SCNC: -5 MMOL/L (ref -4–3)
BODY TEMPERATURE: ABNORMAL DEGREES
CA-I BLD ISE-SCNC: 1.12 MMOL/L (ref 1.1–1.3)
CO2 BLDA-SCNC: 21 MMOL/L (ref 20–33)
GLUCOSE BLD-MCNC: 122 MG/DL (ref 65–99)
HCO3 BLDA-SCNC: 19.8 MMOL/L (ref 17–25)
HCT VFR BLD CALC: 26 % (ref 37–47)
HGB BLD-MCNC: 8.8 G/DL (ref 12–16)
HOROWITZ INDEX BLDA+IHG-RTO: 300 MM[HG]
INST. QUALIFIED PATIENT IIQPT: YES
O2/TOTAL GAS SETTING VFR VENT: 50 %
PCO2 BLDA: 32.9 MMHG (ref 26–37)
PCO2 TEMP ADJ BLDA: 31.5 MMHG (ref 26–37)
PH BLDA: 7.39 [PH] (ref 7.4–7.5)
PH TEMP ADJ BLDA: 7.4 [PH] (ref 7.4–7.5)
PO2 BLDA: 150 MMHG (ref 64–87)
PO2 TEMP ADJ BLDA: 144 MMHG (ref 64–87)
POTASSIUM BLD-SCNC: 3.6 MMOL/L (ref 3.6–5.5)
SAO2 % BLDA: 99 % (ref 93–99)
SODIUM BLD-SCNC: 141 MMOL/L (ref 135–145)
SPECIMEN DRAWN FROM PATIENT: ABNORMAL

## 2019-03-08 PROCEDURE — 700102 HCHG RX REV CODE 250 W/ 637 OVERRIDE(OP): Performed by: UROLOGY

## 2019-03-08 PROCEDURE — 700111 HCHG RX REV CODE 636 W/ 250 OVERRIDE (IP): Performed by: UROLOGY

## 2019-03-08 PROCEDURE — 700105 HCHG RX REV CODE 258

## 2019-03-08 PROCEDURE — A9270 NON-COVERED ITEM OR SERVICE: HCPCS | Performed by: UROLOGY

## 2019-03-08 PROCEDURE — 770006 HCHG ROOM/CARE - MED/SURG/GYN SEMI*

## 2019-03-08 RX ORDER — OXYCODONE HCL 5 MG/5 ML
5 SOLUTION, ORAL ORAL
Status: DISCONTINUED | OUTPATIENT
Start: 2019-03-08 | End: 2019-03-08

## 2019-03-08 RX ORDER — SODIUM CHLORIDE, SODIUM LACTATE, POTASSIUM CHLORIDE, CALCIUM CHLORIDE 600; 310; 30; 20 MG/100ML; MG/100ML; MG/100ML; MG/100ML
INJECTION, SOLUTION INTRAVENOUS CONTINUOUS
Status: DISCONTINUED | OUTPATIENT
Start: 2019-03-08 | End: 2019-03-08

## 2019-03-08 RX ORDER — ONDANSETRON 2 MG/ML
4 INJECTION INTRAMUSCULAR; INTRAVENOUS
Status: DISCONTINUED | OUTPATIENT
Start: 2019-03-08 | End: 2019-03-08

## 2019-03-08 RX ORDER — SODIUM CHLORIDE 9 MG/ML
INJECTION, SOLUTION INTRAVENOUS
Status: COMPLETED
Start: 2019-03-08 | End: 2019-03-08

## 2019-03-08 RX ORDER — HYDROMORPHONE HYDROCHLORIDE 1 MG/ML
0.4 INJECTION, SOLUTION INTRAMUSCULAR; INTRAVENOUS; SUBCUTANEOUS
Status: DISCONTINUED | OUTPATIENT
Start: 2019-03-08 | End: 2019-03-08

## 2019-03-08 RX ORDER — HYDROMORPHONE HYDROCHLORIDE 1 MG/ML
0.2 INJECTION, SOLUTION INTRAMUSCULAR; INTRAVENOUS; SUBCUTANEOUS
Status: DISCONTINUED | OUTPATIENT
Start: 2019-03-08 | End: 2019-03-08

## 2019-03-08 RX ORDER — HALOPERIDOL 5 MG/ML
1 INJECTION INTRAMUSCULAR
Status: DISCONTINUED | OUTPATIENT
Start: 2019-03-08 | End: 2019-03-08

## 2019-03-08 RX ORDER — MIDAZOLAM HYDROCHLORIDE 1 MG/ML
0.5 INJECTION INTRAMUSCULAR; INTRAVENOUS
Status: DISCONTINUED | OUTPATIENT
Start: 2019-03-08 | End: 2019-03-08

## 2019-03-08 RX ORDER — MEPERIDINE HYDROCHLORIDE 50 MG/ML
25 INJECTION INTRAMUSCULAR; INTRAVENOUS; SUBCUTANEOUS
Status: DISCONTINUED | OUTPATIENT
Start: 2019-03-08 | End: 2019-03-08

## 2019-03-08 RX ORDER — OXYCODONE HCL 5 MG/5 ML
10 SOLUTION, ORAL ORAL
Status: DISCONTINUED | OUTPATIENT
Start: 2019-03-08 | End: 2019-03-08

## 2019-03-08 RX ORDER — ACETAMINOPHEN 325 MG/1
650 TABLET ORAL EVERY 6 HOURS
Status: DISCONTINUED | OUTPATIENT
Start: 2019-03-08 | End: 2019-03-12 | Stop reason: HOSPADM

## 2019-03-08 RX ORDER — SODIUM CHLORIDE 9 MG/ML
INJECTION, SOLUTION INTRAVENOUS
Status: ACTIVE
Start: 2019-03-08 | End: 2019-03-09

## 2019-03-08 RX ORDER — HYDROMORPHONE HYDROCHLORIDE 1 MG/ML
0.1 INJECTION, SOLUTION INTRAMUSCULAR; INTRAVENOUS; SUBCUTANEOUS
Status: DISCONTINUED | OUTPATIENT
Start: 2019-03-08 | End: 2019-03-08

## 2019-03-08 RX ADMIN — CEFAZOLIN SODIUM 1 G: 1 INJECTION, SOLUTION INTRAVENOUS at 05:17

## 2019-03-08 RX ADMIN — CARVEDILOL 12.5 MG: 12.5 TABLET, FILM COATED ORAL at 07:29

## 2019-03-08 RX ADMIN — KETOROLAC TROMETHAMINE 15 MG: 30 INJECTION, SOLUTION INTRAMUSCULAR; INTRAVENOUS at 16:53

## 2019-03-08 RX ADMIN — ATORVASTATIN CALCIUM 20 MG: 20 TABLET, FILM COATED ORAL at 21:34

## 2019-03-08 RX ADMIN — CEFAZOLIN SODIUM 1 G: 1 INJECTION, SOLUTION INTRAVENOUS at 13:59

## 2019-03-08 RX ADMIN — KETOROLAC TROMETHAMINE 15 MG: 30 INJECTION, SOLUTION INTRAMUSCULAR; INTRAVENOUS at 09:58

## 2019-03-08 RX ADMIN — CEFAZOLIN SODIUM 1 G: 1 INJECTION, SOLUTION INTRAVENOUS at 21:35

## 2019-03-08 RX ADMIN — SODIUM CHLORIDE 500 ML: 9 INJECTION, SOLUTION INTRAVENOUS at 05:16

## 2019-03-08 RX ADMIN — OXYCODONE HYDROCHLORIDE 2.5 MG: 5 TABLET ORAL at 00:44

## 2019-03-08 RX ADMIN — ACETAMINOPHEN 650 MG: 325 TABLET, FILM COATED ORAL at 16:53

## 2019-03-08 RX ADMIN — ENOXAPARIN SODIUM 30 MG: 100 INJECTION SUBCUTANEOUS at 16:53

## 2019-03-08 RX ADMIN — KETOROLAC TROMETHAMINE 15 MG: 30 INJECTION, SOLUTION INTRAMUSCULAR; INTRAVENOUS at 21:34

## 2019-03-08 RX ADMIN — OXYCODONE HYDROCHLORIDE 2.5 MG: 5 TABLET ORAL at 07:30

## 2019-03-08 RX ADMIN — KETOROLAC TROMETHAMINE 15 MG: 30 INJECTION, SOLUTION INTRAMUSCULAR; INTRAVENOUS at 03:29

## 2019-03-08 RX ADMIN — ENOXAPARIN SODIUM 30 MG: 100 INJECTION SUBCUTANEOUS at 09:57

## 2019-03-08 RX ADMIN — ACETAMINOPHEN 650 MG: 325 TABLET, FILM COATED ORAL at 23:53

## 2019-03-08 RX ADMIN — OXYCODONE HYDROCHLORIDE 2.5 MG: 5 TABLET ORAL at 13:13

## 2019-03-08 RX ADMIN — ACETAMINOPHEN 650 MG: 325 TABLET, FILM COATED ORAL at 13:13

## 2019-03-08 ASSESSMENT — ENCOUNTER SYMPTOMS
GASTROINTESTINAL NEGATIVE: 1
MUSCULOSKELETAL NEGATIVE: 1
NEUROLOGICAL NEGATIVE: 1
EYES NEGATIVE: 1
CARDIOVASCULAR NEGATIVE: 1
PSYCHIATRIC NEGATIVE: 1
CONSTITUTIONAL NEGATIVE: 1
RESPIRATORY NEGATIVE: 1

## 2019-03-08 NOTE — PROGRESS NOTES
Bedside Report received   Assumed care of patient at 0700.    Pt is A&O x 4.  Pain reported at 7/10. Medicated per MAR.  Nausea denied  Tolerating Diet   Surgical incision to midline abd. Dressed with gauze and tape. CDI. Scant old drainage noted to lower portion of dressing.   EVER in place to RLQ. Compressed to self-suction. Moderate amount of serosanguinous drainage noted to collection bulb. Dressing CDI    Urostomy in place to RLQ. Positive urine output noted to collection bag.   - BM since PTA on 3/6/19   - Flatus since surgery, but bowel sounds are present x 4 Q   Up standby with front wheel walker per previous shift   SCD's in place and on  Family at bedside  Bed in lowest position and locked.  Bed alarm NA per Rhina Ellis  Pt resting comfortably now.  Review plan of care with patient  Call light within reach  Hourly rounds in place  All needs met at this time

## 2019-03-08 NOTE — PROGRESS NOTES
Assessment complete.  AA&Ox4. SpO2 >90% on 0.5L via NC. Denies SOB.  Reporting 8/10 pain. Medicated with scheduled toradol.   MLI with dressing CDI.  RLQ EVER drain compressed to self suction. Draining SS output.  RLQ urostomy with blood tinged output.  Tolerating clears at this time. + nausea in previous shift, no complaints at this time.  LBM PTA.  Pt agreeable to start ambulating hallways in AM with staff.   All needs met at this time. Call light within reach. Pt calls appropriately.

## 2019-03-08 NOTE — PROGRESS NOTES
"Assumed care of patient from PACU.  Patient is sleepy but easily aroused, denies pain at this time.  VSS /82   Pulse 88   Temp 36.6 °C (97.8 °F) (Temporal)   Resp 14   Ht 1.549 m (5' 1\")   Wt 69.1 kg (152 lb 5.4 oz)   SpO2 97%   Breastfeeding? No   BMI 28.78 kg/m²   PIV in the R hand, patent and saline locked.  PIV in the L hand, patent and running D5 1/2NS with 20K at 125.  On 1L oxygen with saturations in the mid 90s.   in use.  Last BM PTA.  Urostomy to the R, blood tinged urine noted.  Midline incision, dressing CDI.  EVER drain to the RLQ, self compressed to suction, sanguinous drainage noted.  Patient oriented to the unit, POC discussed with family at the bedside.  Bed is locked and in the lowest position, call light is within reach.  All needs are met at this time, hourly rounding is in place.    2 Rn skin check completed with DOUGIE Kern.  With the exception of the surgical incisions mentioned above, skin intact.  All bony prominences pink and blanching.    "

## 2019-03-08 NOTE — OR NURSING
Pt resting with eyes closed, opens eyes to voice. VSS, on 2LNC, O2Sat 98%. Dressing intact, no apparent distress. Family updated.

## 2019-03-08 NOTE — CARE PLAN
Problem: Safety  Goal: Will remain free from injury  Outcome: PROGRESSING AS EXPECTED    Intervention: Provide assistance with mobility  Family and staff are collaborating to assist the pt with mobilization.       Problem: Pain Management  Goal: Pain level will decrease to patient's comfort goal  Outcome: PROGRESSING AS EXPECTED    Intervention: Follow pain managment plan developed in collaboration with patient and Interdisciplinary Team  Discussed pain medication options with pt. Per pt the scheduled Toradol is her preferred method of pain relief with Oxy in use for breakthrough pain. At this time, the pt states her pain is well controlled.

## 2019-03-08 NOTE — CARE PLAN
Problem: Safety  Goal: Will remain free from injury  Outcome: PROGRESSING AS EXPECTED  Educated not to get out of bed without staff present

## 2019-03-08 NOTE — PROGRESS NOTES
"Urology Progress Note    Post op Day # 1.     Overnight Events: None    S: No fevers, chills, nausea or vomiting.  No flatus. Taking ice chips and sips of clears. Pain controlled. Labs stable.    O:   Blood pressure (!) 89/58, pulse 76, temperature 36.7 °C (98 °F), temperature source Temporal, resp. rate 16, height 1.549 m (5' 1\"), weight 69.1 kg (152 lb 5.4 oz), SpO2 95 %, not currently breastfeeding.  Recent Labs      03/07/19   1709   SODIUM  141   POTASSIUM  3.8   CHLORIDE  113*   CO2  24   GLUCOSE  186*   BUN  15   CREATININE  0.89   CALCIUM  7.8*     Recent Labs      03/07/19   1709   WBC  9.8   RBC  3.23*   HEMOGLOBIN  10.8*   HEMATOCRIT  32.3*   MCV  100.0*   MCH  33.4*   MCHC  33.4*   RDW  41.1   PLATELETCT  110*   MPV  9.1         Intake/Output Summary (Last 24 hours) at 03/08/19 0935  Last data filed at 03/08/19 0800   Gross per 24 hour   Intake             4200 ml   Output             1875 ml   Net             2325 ml       Exam:  Abdomen soft, benign.  Incisions clean, dry, intact. Stoma pink with intact stents. EVER serous.   Urine: clear      A/P:  There are no active hospital problems to display for this patient.      Ice chips and sips of clears for now  Ambulate today  Labs in AM  "

## 2019-03-09 PROCEDURE — 700111 HCHG RX REV CODE 636 W/ 250 OVERRIDE (IP): Performed by: UROLOGY

## 2019-03-09 PROCEDURE — 770006 HCHG ROOM/CARE - MED/SURG/GYN SEMI*

## 2019-03-09 PROCEDURE — 700102 HCHG RX REV CODE 250 W/ 637 OVERRIDE(OP): Performed by: UROLOGY

## 2019-03-09 PROCEDURE — A9270 NON-COVERED ITEM OR SERVICE: HCPCS | Performed by: UROLOGY

## 2019-03-09 RX ADMIN — ACETAMINOPHEN 650 MG: 325 TABLET, FILM COATED ORAL at 04:58

## 2019-03-09 RX ADMIN — DEXAMETHASONE SODIUM PHOSPHATE 4 MG: 4 INJECTION, SOLUTION INTRAMUSCULAR; INTRAVENOUS at 19:32

## 2019-03-09 RX ADMIN — ACETAMINOPHEN 650 MG: 325 TABLET, FILM COATED ORAL at 12:32

## 2019-03-09 RX ADMIN — CARVEDILOL 12.5 MG: 12.5 TABLET, FILM COATED ORAL at 17:24

## 2019-03-09 RX ADMIN — ATORVASTATIN CALCIUM 20 MG: 20 TABLET, FILM COATED ORAL at 21:01

## 2019-03-09 RX ADMIN — KETOROLAC TROMETHAMINE 15 MG: 30 INJECTION, SOLUTION INTRAMUSCULAR; INTRAVENOUS at 12:32

## 2019-03-09 RX ADMIN — CEFAZOLIN SODIUM 1 G: 1 INJECTION, SOLUTION INTRAVENOUS at 13:59

## 2019-03-09 RX ADMIN — CEFAZOLIN SODIUM 1 G: 1 INJECTION, SOLUTION INTRAVENOUS at 21:01

## 2019-03-09 RX ADMIN — OXYCODONE HYDROCHLORIDE 2.5 MG: 5 TABLET ORAL at 01:32

## 2019-03-09 RX ADMIN — OXYCODONE HYDROCHLORIDE 2.5 MG: 5 TABLET ORAL at 08:35

## 2019-03-09 RX ADMIN — CEFAZOLIN SODIUM 1 G: 1 INJECTION, SOLUTION INTRAVENOUS at 04:58

## 2019-03-09 RX ADMIN — KETOROLAC TROMETHAMINE 15 MG: 30 INJECTION, SOLUTION INTRAMUSCULAR; INTRAVENOUS at 17:24

## 2019-03-09 RX ADMIN — KETOROLAC TROMETHAMINE 15 MG: 30 INJECTION, SOLUTION INTRAMUSCULAR; INTRAVENOUS at 04:58

## 2019-03-09 RX ADMIN — CARVEDILOL 12.5 MG: 12.5 TABLET, FILM COATED ORAL at 08:34

## 2019-03-09 RX ADMIN — ACETAMINOPHEN 650 MG: 325 TABLET, FILM COATED ORAL at 17:24

## 2019-03-09 RX ADMIN — OXYCODONE HYDROCHLORIDE 2.5 MG: 5 TABLET ORAL at 14:35

## 2019-03-09 RX ADMIN — ONDANSETRON 4 MG: 2 INJECTION INTRAMUSCULAR; INTRAVENOUS at 17:00

## 2019-03-09 RX ADMIN — ENOXAPARIN SODIUM 30 MG: 100 INJECTION SUBCUTANEOUS at 17:23

## 2019-03-09 RX ADMIN — ENOXAPARIN SODIUM 30 MG: 100 INJECTION SUBCUTANEOUS at 04:58

## 2019-03-09 NOTE — PROGRESS NOTES
"Urology Progress Note    Post op Day # 2    Overnight Events: None    S: No fevers, chills, nausea or vomiting.  No  Flatus.  Ambulating. Tolerating clears. Pain 5/10.     O:   Blood pressure 109/71, pulse 83, temperature 36.6 °C (97.9 °F), temperature source Temporal, resp. rate 18, height 1.549 m (5' 1\"), weight 69.1 kg (152 lb 5.4 oz), SpO2 92 %, not currently breastfeeding.  Recent Labs      03/07/19   1709   SODIUM  141   POTASSIUM  3.8   CHLORIDE  113*   CO2  24   GLUCOSE  186*   BUN  15   CREATININE  0.89   CALCIUM  7.8*     Recent Labs      03/07/19   1709   WBC  9.8   RBC  3.23*   HEMOGLOBIN  10.8*   HEMATOCRIT  32.3*   MCV  100.0*   MCH  33.4*   MCHC  33.4*   RDW  41.1   PLATELETCT  110*   MPV  9.1         Intake/Output Summary (Last 24 hours) at 03/09/19 0834  Last data filed at 03/09/19 0453   Gross per 24 hour   Intake              450 ml   Output              920 ml   Net             -470 ml       Exam:  Abdomen soft, benign.  Incisions clean, dry, intact. Stoma pink with intact stents   Urine: clear      A/P:  There are no active hospital problems to display for this patient.      Stable.   Ambulate, IS.  Advance diet to full liquids until passing flatus  Labs in AM    "

## 2019-03-09 NOTE — PROGRESS NOTES
"Patient resting comfortably this am, pain well controlled on PO medication and IV Toradol. Urostomy with clear yellow urine, Ostomy consult placed. Midline with island dressing CDI. Ambulating in hallway with staff. +BS, -Flatus, -BM. Tolerating full liquids no nausea no vomiting. /53   Pulse 68   Temp 36.8 °C (98.3 °F) (Temporal)   Resp 17   Ht 1.549 m (5' 1\")   Wt 69.1 kg (152 lb 5.4 oz)   SpO2 93%   Breastfeeding? No   BMI 28.78 kg/m²     "

## 2019-03-09 NOTE — CARE PLAN
Problem: Communication  Goal: The ability to communicate needs accurately and effectively will improve  Outcome: PROGRESSING AS EXPECTED  Education provided on importance of using call light to alert staff of patient needs. Pt demonstrates understanding by using call light appropriately.    Problem: Discharge Barriers/Planning  Goal: Patient's continuum of care needs will be met  Outcome: PROGRESSING AS EXPECTED  Wound care consult in place for new urostomy.

## 2019-03-09 NOTE — PROGRESS NOTES
Surgical Progress Note    Author: Jaquan Ritter Date & Time created: 3/8/2019   5:31 PM     Interval Events:  Post operative day#1 Status post anterior pelvic exenteration. Patient prefers NSAID to the opioids. No other complaints  Review of Systems   Constitutional: Negative.    HENT: Negative.    Eyes: Negative.    Respiratory: Negative.    Cardiovascular: Negative.    Gastrointestinal: Negative.    Genitourinary: Negative.    Musculoskeletal: Negative.    Skin: Negative.    Neurological: Negative.    Endo/Heme/Allergies: Negative.    Psychiatric/Behavioral: Negative.      Hemodynamics:  Temp (24hrs), Av.4 °C (97.6 °F), Min:36.1 °C (97 °F), Max:36.7 °C (98 °F)  Temperature: 36.7 °C (98 °F)  Pulse  Av.3  Min: 70  Max: 95   Blood Pressure : 101/49     Respiratory:    Respiration: 17, Pulse Oximetry: 95 %        RUL Breath Sounds: Clear, RML Breath Sounds: Clear, RLL Breath Sounds: Diminished, TAB Breath Sounds: Clear, LLL Breath Sounds: Diminished  Neuro:  GCS       Fluids:    Intake/Output Summary (Last 24 hours) at 19 1731  Last data filed at 19 1600   Gross per 24 hour   Intake              650 ml   Output              630 ml   Net               20 ml        Current Diet Order   Procedures   • Diet Order Clear Liquid     Physical Exam   Constitutional: She is oriented to person, place, and time. She appears well-developed and well-nourished.   HENT:   Head: Normocephalic and atraumatic.   Mouth/Throat: Oropharynx is clear and moist.   Eyes: Pupils are equal, round, and reactive to light. Conjunctivae and EOM are normal.   Neck: Normal range of motion. Neck supple.   Cardiovascular: Normal rate, regular rhythm, normal heart sounds and intact distal pulses.    Pulmonary/Chest: Effort normal and breath sounds normal.   Abdominal: Soft. There is tenderness.   Stoma pink and viable   Musculoskeletal: Normal range of motion.   Neurological: She is alert and oriented to person, place, and time.    Skin: Skin is warm and dry.   Psychiatric: She has a normal mood and affect. Her behavior is normal. Judgment and thought content normal.     Labs:  No results found for this or any previous visit (from the past 24 hour(s)).  Medical Decision Making, by Problem:  There are no active hospital problems to display for this patient.    Plan:  OOB into chair and ambulate with assist.  ADAT   Leave EVER and stents in place.  Lovenox for total of 30 days post op.    Quality Measures:  Quality-Core Measures   Reviewed items::  Labs reviewed and Medications reviewed      Discussed patient condition with Family, RN and Patient

## 2019-03-09 NOTE — WOUND TEAM
" [facility]  Inpatient Services  New Ostomy Management & Teaching    HPI:  Reviewed  PMH: Reviewed   SH: Reviewed    Subjective:  \"I just took some pain medication, I'm trying to stay awake.\"     Objective:  In bed, family at bedside.  2 daughters who will be assisting upon DC.         Ostomy type:  Urostomy      Stoma location:  RLQ         Stoma assessment:    Appearance:  Pink, budded with stents in place       Size:  1\"     Protrusion:  Budded         Output:  Min, yellow     MC jxn  Intact     Peristomal skin:  Intact      Ostomy Appliance (type and size):  2 piece 2 1/4 with paste ring       Interventions:  Went over all paperwork with patient, daughter and .  SS signed.  Patient and family observed all steps and verbalized understanding.  Removed previous appliance and cleansed skin with warm wash cloth.  Made template and cut barrier to fit.  Applied paste ring to barrier and applied to skin.  Applied pouch and closed end.        Pt education: Questions and concerns addressed    Evaluation:  Patient states per  She will be discharged later in the week.  Will plan to see patient with daughter Monday afternoon.          Plan: Ostomy nurses to continue to follow for ostomy needs and teaching     Anticipated discharge needs: Supplies, supplier information, possible HH or outpatient ostomy clinic     "

## 2019-03-09 NOTE — PROGRESS NOTES
Assessment complete.  AA&Ox4. SpO2 >90% on 0.5L via NC. Denies SOB.  Reporting 8/10 pain. Medicated with scheduled toradol.   MLI with dressing CDI.  RLQ EVER drain compressed to self suction. Draining SS output.  RLQ urostomy with blood tinged output.  Tolerating clear liquid diet. Denies N/V.  LBM 3/6 PTA.  Pt up with assist x1 using FWW.  All needs met at this time. Call light within reach. Pt calls appropriately.

## 2019-03-10 PROCEDURE — 770006 HCHG ROOM/CARE - MED/SURG/GYN SEMI*

## 2019-03-10 PROCEDURE — A9270 NON-COVERED ITEM OR SERVICE: HCPCS | Performed by: UROLOGY

## 2019-03-10 PROCEDURE — 700111 HCHG RX REV CODE 636 W/ 250 OVERRIDE (IP): Performed by: UROLOGY

## 2019-03-10 PROCEDURE — 700102 HCHG RX REV CODE 250 W/ 637 OVERRIDE(OP): Performed by: UROLOGY

## 2019-03-10 RX ORDER — FAMOTIDINE 20 MG/1
20 TABLET, FILM COATED ORAL DAILY
Status: DISCONTINUED | OUTPATIENT
Start: 2019-03-10 | End: 2019-03-12 | Stop reason: HOSPADM

## 2019-03-10 RX ADMIN — OXYCODONE HYDROCHLORIDE 2.5 MG: 5 TABLET ORAL at 07:31

## 2019-03-10 RX ADMIN — ACETAMINOPHEN 650 MG: 325 TABLET, FILM COATED ORAL at 23:14

## 2019-03-10 RX ADMIN — ENOXAPARIN SODIUM 30 MG: 100 INJECTION SUBCUTANEOUS at 17:54

## 2019-03-10 RX ADMIN — ACETAMINOPHEN 650 MG: 325 TABLET, FILM COATED ORAL at 05:53

## 2019-03-10 RX ADMIN — ACETAMINOPHEN 650 MG: 325 TABLET, FILM COATED ORAL at 00:18

## 2019-03-10 RX ADMIN — CEFAZOLIN SODIUM 1 G: 1 INJECTION, SOLUTION INTRAVENOUS at 05:53

## 2019-03-10 RX ADMIN — CARVEDILOL 12.5 MG: 12.5 TABLET, FILM COATED ORAL at 07:31

## 2019-03-10 RX ADMIN — ENOXAPARIN SODIUM 30 MG: 100 INJECTION SUBCUTANEOUS at 05:53

## 2019-03-10 RX ADMIN — OXYCODONE HYDROCHLORIDE 2.5 MG: 5 TABLET ORAL at 14:02

## 2019-03-10 RX ADMIN — FAMOTIDINE 20 MG: 20 TABLET ORAL at 11:24

## 2019-03-10 RX ADMIN — KETOROLAC TROMETHAMINE 15 MG: 30 INJECTION, SOLUTION INTRAMUSCULAR; INTRAVENOUS at 00:18

## 2019-03-10 RX ADMIN — CARVEDILOL 12.5 MG: 12.5 TABLET, FILM COATED ORAL at 17:54

## 2019-03-10 RX ADMIN — ATORVASTATIN CALCIUM 20 MG: 20 TABLET, FILM COATED ORAL at 20:56

## 2019-03-10 RX ADMIN — KETOROLAC TROMETHAMINE 15 MG: 30 INJECTION, SOLUTION INTRAMUSCULAR; INTRAVENOUS at 05:54

## 2019-03-10 RX ADMIN — CEFAZOLIN SODIUM 1 G: 1 INJECTION, SOLUTION INTRAVENOUS at 13:27

## 2019-03-10 RX ADMIN — ACETAMINOPHEN 650 MG: 325 TABLET, FILM COATED ORAL at 11:24

## 2019-03-10 RX ADMIN — ACETAMINOPHEN 650 MG: 325 TABLET, FILM COATED ORAL at 17:54

## 2019-03-10 RX ADMIN — CEFAZOLIN SODIUM 1 G: 1 INJECTION, SOLUTION INTRAVENOUS at 20:56

## 2019-03-10 NOTE — PROGRESS NOTES
"Pt AA&Ox4. Pain rating at 2. Denies pain intervention at this time. No c/o n/v, n/t or SOB. Pt weaned to room air. sats at 92%. Pt ready to eat full liquid diet. Will confirm with MD to advance diet. Plan for pt to ambulate in hallway three times a day today. Midline incision with dressing CDI. RLQ EVER drain with small serosang output. urosotomy with moderate output. Hourly rounding in place. Call light within reach. All questions answered. Blood pressure 116/52, pulse 72, temperature 36.5 °C (97.7 °F), temperature source Temporal, resp. rate 16, height 1.549 m (5' 1\"), weight 69.1 kg (152 lb 5.4 oz), SpO2 95 %, not currently breastfeeding.      "

## 2019-03-10 NOTE — PROGRESS NOTES
"S: acid stomach    O: Blood pressure 105/55, pulse 62, temperature 36.6 °C (97.8 °F), temperature source Temporal, resp. rate 17, height 1.549 m (5' 1\"), weight 69.1 kg (152 lb 5.4 oz), SpO2 92 %, not currently breastfeeding.    Intake/Output Summary (Last 24 hours) at 03/10/19 0937  Last data filed at 03/10/19 0900   Gross per 24 hour   Intake              480 ml   Output             1055 ml   Net             -575 ml     Recent Labs      03/07/19   1709   WBC  9.8   RBC  3.23*   HEMOGLOBIN  10.8*   HEMATOCRIT  32.3*   MCV  100.0*   MCH  33.4*   MCHC  33.4*   RDW  41.1   PLATELETCT  110*   MPV  9.1     Recent Labs      03/07/19   1709   SODIUM  141   POTASSIUM  3.8   CHLORIDE  113*   CO2  24   GLUCOSE  186*   BUN  15   CREATININE  0.89   CALCIUM  7.8*     Recent Labs      03/07/19   1709   SODIUM  141   POTASSIUM  3.8   CHLORIDE  113*   CO2  24   GLUCOSE  186*   BUN  15       Wound clean, no drainage  BS+, lungs clear  Stoma pink    A: There are no active hospital problems to display for this patient.  Bladder ca s/p cystectomy - await pathology  Post op ileus resolving      P:   1. Pepcid po  2. FLD  3. Incentive spirometer  4. Ambulate  5. Progressing well  "

## 2019-03-10 NOTE — PROGRESS NOTES
"Pt A&O x 4.     Vitals: /47   Pulse 86   Temp 36.2 °C (97.2 °F) (Temporal)   Resp 17   Ht 1.549 m (5' 1\")   Wt 69.1 kg (152 lb 5.4 oz)   SpO2 94%   Breastfeeding? No   BMI 28.78 kg/m²      Pt rates pain 3 out of 10. Declines additional interventions at this time.      Neuro: FLORES. Denies new onset of numbness/ tingling.     Cardiac: Denies new onset of chest pain.     Vascular: Pulses 2+ BUE, BLE. No edema noted.     Respiratory: Lungs sound clear/diminished to auscultation. On 0.5L of O2 via nasal cannula.  on, satting in 90's. Denies SOB.     GI: Abdomen soft, tender. Hypoactive bowel sounds, + flatus, - BM. + nausea/ vomiting, medicated per MAR.     : R urostomy in place, + output. Pt voiding adequately.      MSK: Pt up to bathroom with one assist and a front wheel walker, tolerating well.     Integumentary: Midline abd incision, dressing in place, CDI. RLQ EVER drain to self suction, + serosanguinous output, dressing CDI. RLQ urostomy, appliance intact, stoma pink.     Labs noted.     Fall precautions in place: Bed locked in lowest position, Upper bed rails up, treaded socks in place, personal belongings within reach, call light within reach, appropriate mobility signs in place, - bed alarm. Pt calls appropriately.      Pt updated on POC.    "

## 2019-03-10 NOTE — CARE PLAN
Problem: Safety  Goal: Will remain free from falls    Intervention: Assess risk factors for falls  Pt up with one person assist and FWW. Call light within reach. Calls for assistance as needed.       Problem: Pain Management  Goal: Pain level will decrease to patient's comfort goal  Pain controlled with oral pain meds

## 2019-03-10 NOTE — CARE PLAN
Problem: Communication  Goal: The ability to communicate needs accurately and effectively will improve  Outcome: PROGRESSING AS EXPECTED  Patient updated on POC, all questions answered at this time.    Problem: Bowel/Gastric:  Goal: Normal bowel function is maintained or improved  Outcome: PROGRESSING AS EXPECTED  + flatus.  + n/v, medicated per MAR.

## 2019-03-11 PROCEDURE — A9270 NON-COVERED ITEM OR SERVICE: HCPCS | Performed by: UROLOGY

## 2019-03-11 PROCEDURE — 770006 HCHG ROOM/CARE - MED/SURG/GYN SEMI*

## 2019-03-11 PROCEDURE — 700111 HCHG RX REV CODE 636 W/ 250 OVERRIDE (IP): Performed by: UROLOGY

## 2019-03-11 PROCEDURE — 700102 HCHG RX REV CODE 250 W/ 637 OVERRIDE(OP): Performed by: UROLOGY

## 2019-03-11 PROCEDURE — 302104 KIT,UROSTOMY 2-PIECE 2 1/4": Performed by: UROLOGY

## 2019-03-11 RX ADMIN — ACETAMINOPHEN 650 MG: 325 TABLET, FILM COATED ORAL at 17:42

## 2019-03-11 RX ADMIN — CEFAZOLIN SODIUM 1 G: 1 INJECTION, SOLUTION INTRAVENOUS at 21:21

## 2019-03-11 RX ADMIN — ACETAMINOPHEN 650 MG: 325 TABLET, FILM COATED ORAL at 05:18

## 2019-03-11 RX ADMIN — CEFAZOLIN SODIUM 1 G: 1 INJECTION, SOLUTION INTRAVENOUS at 05:18

## 2019-03-11 RX ADMIN — FAMOTIDINE 20 MG: 20 TABLET ORAL at 05:18

## 2019-03-11 RX ADMIN — ENOXAPARIN SODIUM 30 MG: 100 INJECTION SUBCUTANEOUS at 05:23

## 2019-03-11 RX ADMIN — SCOPALAMINE 1 PATCH: 1 PATCH, EXTENDED RELEASE TRANSDERMAL at 05:19

## 2019-03-11 RX ADMIN — CARVEDILOL 12.5 MG: 12.5 TABLET, FILM COATED ORAL at 17:42

## 2019-03-11 RX ADMIN — ACETAMINOPHEN 650 MG: 325 TABLET, FILM COATED ORAL at 23:07

## 2019-03-11 RX ADMIN — ACETAMINOPHEN 650 MG: 325 TABLET, FILM COATED ORAL at 13:51

## 2019-03-11 RX ADMIN — OXYCODONE HYDROCHLORIDE 2.5 MG: 5 TABLET ORAL at 08:09

## 2019-03-11 RX ADMIN — CEFAZOLIN SODIUM 1 G: 1 INJECTION, SOLUTION INTRAVENOUS at 13:51

## 2019-03-11 RX ADMIN — OXYCODONE HYDROCHLORIDE 2.5 MG: 5 TABLET ORAL at 15:20

## 2019-03-11 RX ADMIN — ATORVASTATIN CALCIUM 20 MG: 20 TABLET, FILM COATED ORAL at 21:21

## 2019-03-11 RX ADMIN — CARVEDILOL 12.5 MG: 12.5 TABLET, FILM COATED ORAL at 08:11

## 2019-03-11 RX ADMIN — ENOXAPARIN SODIUM 30 MG: 100 INJECTION SUBCUTANEOUS at 17:42

## 2019-03-11 ASSESSMENT — ENCOUNTER SYMPTOMS
EYES NEGATIVE: 1
CHILLS: 0
VOMITING: 0
PSYCHIATRIC NEGATIVE: 1
ABDOMINAL PAIN: 1
NAUSEA: 0
RESPIRATORY NEGATIVE: 1
NEUROLOGICAL NEGATIVE: 1
CONSTITUTIONAL NEGATIVE: 1
MUSCULOSKELETAL NEGATIVE: 1
GASTROINTESTINAL NEGATIVE: 1
FEVER: 0

## 2019-03-11 NOTE — CARE PLAN
Problem: Safety  Goal: Will remain free from injury  Pt up with standby assist and fWW. Steady gait. Call light within reach. Calls for assistance as needed.     Problem: Pain Management  Goal: Pain level will decrease to patient's comfort goal  Pain controlled with oral pain meds

## 2019-03-11 NOTE — PROGRESS NOTES
"Pt AA&Ox4. Pain rating at 6 this am. Medicated per MAR. No c/o n/v, n/t or SOB. Midline incision with staples and open to air. Urostomy and DEMETRI drain to RLQ. urosotmy with moderate urine output. demetri with moderate serosang output. Pt up to the bathroom this am. Had a small BM this am. Up with SBA and FWW. Steady gait. Plan of care discussed. Hourly rounding in place. Blood pressure 139/61, pulse 60, temperature 36.9 °C (98.4 °F), temperature source Temporal, resp. rate 16, height 1.549 m (5' 1\"), weight 69.1 kg (152 lb 5.4 oz), SpO2 94 %, not currently breastfeeding.      "

## 2019-03-11 NOTE — CARE PLAN
Problem: Communication  Goal: The ability to communicate needs accurately and effectively will improve  Outcome: PROGRESSING AS EXPECTED  Patient updated on POC, all questions answered at this time.    Problem: Safety  Goal: Will remain free from injury  Outcome: PROGRESSING AS EXPECTED  Bed in locked and lowest position, call light within reach.

## 2019-03-11 NOTE — PROGRESS NOTES
Note to reader: this note follows the APSO format rather than the historical SOAP format. Assessment and plan located at the top of the note for ease of use.    Chief Complaint  74 y.o. year old female here with muscle invasive bladder cancer s/p neoadjuvant chemotherapy and anterior pelvic exenteration with ileal conduit urinary diversion 3/7/19.    Assessment/Plan  Interval History   Muscle invasive bladder cancer s/p anterior pelvic exenteration with ileal conduit urinary diversion 3/7/19  3/11-POD #4. BM this morning. Tolerating liquids and soft diet. No N/V. Pain minimal Ambulating. EVER 115 cc. Good UOP           ADAT to regular diet  Coordinate for Lovenox at home  Defers Home Health  Perhaps DC in am      Patient seen and examined. Case discussed with patient and urology-Dr. Ritter      Review of Systems  Physical Exam   Review of Systems   Constitutional: Negative for chills and fever.   Gastrointestinal: Positive for abdominal pain. Negative for nausea and vomiting.     Vitals:    03/10/19 1654 03/10/19 2100 03/11/19 0400 03/11/19 0810   BP: 118/63 103/50 121/49 139/61   Pulse: 93 74 69 60   Resp: 16 16 16    Temp: 36.8 °C (98.3 °F) 36.4 °C (97.6 °F) 36.7 °C (98 °F) 36.9 °C (98.4 °F)   TempSrc: Temporal Temporal Temporal Temporal   SpO2: 94% 96% 94% 94%   Weight:       Height:         Physical Exam   Constitutional: She is oriented to person, place, and time and well-developed, well-nourished, and in no distress. No distress.   Pulmonary/Chest: Effort normal.   Abdominal: Soft. She exhibits no distension. There is no tenderness.   Urostomy RLQ with ureteral stent protruding. Stoma pink Urine clear. EVER in place with scant serosanguinous drainage. Post surgical midline incision well approximated with staples. No drainage or erythema.    Neurological: She is alert and oriented to person, place, and time.   Skin: Skin is warm and dry.   Psychiatric: Affect and judgment normal.   Nursing note and vitals reviewed.        Hematology Chemistry   Lab Results   Component Value Date/Time    WBC 9.8 03/07/2019 05:09 PM    HEMOGLOBIN 10.8 (L) 03/07/2019 05:09 PM    HEMATOCRIT 32.3 (L) 03/07/2019 05:09 PM    PLATELETCT 110 (L) 03/07/2019 05:09 PM     Lab Results   Component Value Date/Time    SODIUM 141 03/07/2019 05:09 PM    POTASSIUM 3.8 03/07/2019 05:09 PM    CHLORIDE 113 (H) 03/07/2019 05:09 PM    CO2 24 03/07/2019 05:09 PM    GLUCOSE 186 (H) 03/07/2019 05:09 PM    BUN 15 03/07/2019 05:09 PM    CREATININE 0.89 03/07/2019 05:09 PM         Labs not explicitly included in this progress note were reviewed by the author.   Radiology/imaging not explicitly included in this progress note was reviewed by the author.     Medications reviewed and Labs reviewed

## 2019-03-12 VITALS
HEART RATE: 83 BPM | DIASTOLIC BLOOD PRESSURE: 73 MMHG | RESPIRATION RATE: 16 BRPM | WEIGHT: 152.34 LBS | SYSTOLIC BLOOD PRESSURE: 149 MMHG | HEIGHT: 61 IN | BODY MASS INDEX: 28.76 KG/M2 | TEMPERATURE: 97.2 F | OXYGEN SATURATION: 94 %

## 2019-03-12 PROCEDURE — 700111 HCHG RX REV CODE 636 W/ 250 OVERRIDE (IP): Performed by: UROLOGY

## 2019-03-12 PROCEDURE — A9270 NON-COVERED ITEM OR SERVICE: HCPCS | Performed by: UROLOGY

## 2019-03-12 PROCEDURE — 700102 HCHG RX REV CODE 250 W/ 637 OVERRIDE(OP): Performed by: UROLOGY

## 2019-03-12 RX ORDER — OXYCODONE HYDROCHLORIDE 5 MG/1
2.5 TABLET ORAL EVERY 6 HOURS PRN
Qty: 12 TAB | Refills: 0 | Status: ON HOLD | OUTPATIENT
Start: 2019-03-12 | End: 2019-03-22

## 2019-03-12 RX ORDER — DOCUSATE SODIUM 100 MG/1
100 CAPSULE, LIQUID FILLED ORAL 2 TIMES DAILY
Qty: 60 CAP | Refills: 0 | Status: SHIPPED | OUTPATIENT
Start: 2019-03-12 | End: 2022-10-13

## 2019-03-12 RX ADMIN — CEFAZOLIN SODIUM 1 G: 1 INJECTION, SOLUTION INTRAVENOUS at 05:05

## 2019-03-12 RX ADMIN — FAMOTIDINE 20 MG: 20 TABLET ORAL at 05:05

## 2019-03-12 RX ADMIN — OXYCODONE HYDROCHLORIDE 2.5 MG: 5 TABLET ORAL at 01:04

## 2019-03-12 RX ADMIN — ACETAMINOPHEN 650 MG: 325 TABLET, FILM COATED ORAL at 05:05

## 2019-03-12 RX ADMIN — CARVEDILOL 12.5 MG: 12.5 TABLET, FILM COATED ORAL at 08:30

## 2019-03-12 RX ADMIN — ENOXAPARIN SODIUM 30 MG: 100 INJECTION SUBCUTANEOUS at 05:15

## 2019-03-12 NOTE — WOUND TEAM
" [facility]  Inpatient Services  New Ostomy Management & Teaching    HPI:  Reviewed  PMH: Reviewed   SH: Reviewed    Subjective:  \"My family is coming in around 10am\"     Objective:  In bed.  Appliance intact          Ostomy type:  Urostomy      Stoma location:  RLQ         Stoma assessment:    Appearance:  Pink, budded with stents in place       Size:  1\"     Protrusion:  Budded         Output:  Min, yellow     MC jxn  Intact     Peristomal skin:  Intact      Ostomy Appliance (type and size):  2 piece 2 1/4 with paste ring       Interventions:  Daughter preformed all steps with promting.  Went over supply catelog with daughter and supplies given.  Daughter removed previous appliance and cleansed skin with warm wash cloth.  Cut barrier with template and cut barrier to fit.  Applied paste ring to barrier and applied to skin.  Applied pouch and closed end.    Spoke to social work who will work on schedule patient as OP clinic appt in next couple weeks for follow up with ostomy RN.  Patient/family agreeable.        Pt education: Questions and concerns addressed    Evaluation:   Daughter preformed all steps and patient is knowledgeable on how to change appliance.  Would like them to follow up because os is at 0600 and appears to be pointing down.  Stents in place now but may need different supplies when stents out.       Plan: Ostomy nurses to continue to follow for ostomy needs and teaching     Anticipated discharge needs: home with ostomy clinic follow up.  "

## 2019-03-12 NOTE — DISCHARGE SUMMARY
Date of admission : 3/7/19  Date of discharge : 3/12/19      Reason for admission : Muscle invasive bladder cancer  Surgical procedures : Anterior pelvic exenteration, extended pelvic lymphadenectomy, ileal conduit urinary diversion 3/7/19  Surgeon : Dr. Ritter    Discharge disposition : Stable    Hospital course :  74 year old female with muscle invasive bladder cancer who underwent neoadjuvant chemotherapy followed by the above procedures after which she required post operative care. She had an uneventful post operative course. Pain was controlled with APAP and minimal narcotics at patient request. She was OOB to chair on POD #1 and was able to begin a diet at that point. On POD#2 she was ambulatory in the rudd. Bowel function was restored and her diet was advanced further. On POD #4 she reported passage of BM. EVER output was low and her EVER was removed. She maintained good urine output and her wounds were healing well. On POD #5 she was stable and ready for discharge home.     She was instructed on Lovenox injections which will continue for a total of 30 days post op. She was given pain medications for home use and advised to avoid any strenuous activities. We discussed office follow up for stable removal and removal of her ureteral catheters in 7-10 days.    Total time spent on discharge summary : 34 minutes.

## 2019-03-12 NOTE — CARE PLAN
Problem: Communication  Goal: The ability to communicate needs accurately and effectively will improve  Outcome: PROGRESSING AS EXPECTED  Patient updated on POC, all questions answered at this time.    Problem: Safety  Goal: Will remain free from injury  Outcome: PROGRESSING AS EXPECTED  Patient calls appropriately for assistance. Uses a front wheel walker while ambulating. Bed in locked and lowest position, call light within reach.

## 2019-03-12 NOTE — PROGRESS NOTES
Surgical Progress Note    Author: Jaquan Ritter Date & Time created: 3/11/2019   5:18 PM     Interval Events:  Post operative day #4 status post anterior pelvic exenteration with bilateral pelvic lymphadenectomy and ileal conduit urinary diversion.  Patient is without complaints and has good pain control.  Review of Systems   Constitutional: Negative.    HENT: Negative.    Eyes: Negative.    Respiratory: Negative.    Gastrointestinal: Negative.    Genitourinary: Negative.    Musculoskeletal: Negative.    Skin: Negative.    Neurological: Negative.    Endo/Heme/Allergies: Negative.    Psychiatric/Behavioral: Negative.      Hemodynamics:  Temp (24hrs), Av.6 °C (97.9 °F), Min:36.4 °C (97.6 °F), Max:36.9 °C (98.4 °F)  Temperature: 36.5 °C (97.7 °F)  Pulse  Av.5  Min: 60  Max: 95   Blood Pressure : 140/56     Respiratory:    Respiration: 18, Pulse Oximetry: 98 %        RUL Breath Sounds: Clear, RML Breath Sounds: Clear, RLL Breath Sounds: Diminished, TAB Breath Sounds: Clear, LLL Breath Sounds: Diminished  Neuro:  GCS       Fluids:    Intake/Output Summary (Last 24 hours) at 19 1718  Last data filed at 19 1245   Gross per 24 hour   Intake             1310 ml   Output             1355 ml   Net              -45 ml        Current Diet Order   Procedures   • Diet Order Regular     Physical Exam   Constitutional: She is oriented to person, place, and time. She appears well-developed and well-nourished.   HENT:   Head: Normocephalic and atraumatic.   Mouth/Throat: Oropharynx is clear and moist.   Eyes: Pupils are equal, round, and reactive to light. Conjunctivae and EOM are normal.   Neck: Normal range of motion. Neck supple.   Cardiovascular: Normal rate, regular rhythm and normal heart sounds.    Pulmonary/Chest: Effort normal and breath sounds normal.   Abdominal: Soft. Bowel sounds are normal.   Musculoskeletal: Normal range of motion.   Neurological: She is alert and oriented to person, place, and time.    Skin: Skin is warm and dry.   Psychiatric: She has a normal mood and affect. Her behavior is normal. Judgment and thought content normal.     Labs:  No results found for this or any previous visit (from the past 24 hour(s)).  Medical Decision Making, by Problem:  There are no active hospital problems to display for this patient.    Plan:  D/C EVER drain this evening.  Home in AM with ostomy supplies and Lovenox.  Follow-up with Dr. Ritter in 7-10 past D/C for staple and removal of ureteral catheters.    Quality Measures:  Quality-Core Measures   Reviewed items::  Labs reviewed and Medications reviewed      Discussed patient condition with RN and Patient

## 2019-03-12 NOTE — OP REPORT
DATE OF SERVICE:  03/07/2019    PREOPERATIVE DIAGNOSES:  1.  Muscle invasive bladder cancer.  2.  Status post neoadjuvant chemotherapy.    OPERATION AND PROCEDURE PERFORMED:  1.  Bilateral extended pelvic lymph node dissection.  2.  Anterior pelvic exenteration.  3.  Ileal conduit urinary diversion.    SURGEON:  Jaquan Ritter MD    ASSISTING SURGEON:  Andrés Burk MD    ANESTHESIA:  General endotracheal.    ANESTHESIOLOGIST:  Jayesh Barton MD    POSTOPERATIVE DIAGNOSES:  1.  Muscle invasive bladder cancer.  2.  Status post neoadjuvant chemotherapy.    COMPLICATIONS:  None.    ESTIMATED BLOOD LOSS:  400 mL.    DRAINS:  1.  A 6-Ethiopian urinary diversion stent in the left and right kidney, brought   out through the urostomy.  2.  A 10 mm Vicente-Salcedo drain placed in the right hemipelvis.    SPECIMENS:  A.  Right distal ureter for frozen section, reported negative.  B.  Left distal ureter for frozen section, reported negative.  C.  Right pelvic lymph nodes from bifurcation of aorta to the node of [place]   including the obturator fossa to the level of the general femoral nerve.  D.  Ovaries, tubes, uterus, anterior vaginal wall with bladder, en bloc to   pathology for permanent section.  E.  Left pelvic nodes from general femoral nerve bifurcation of the aorta   including all of the external iliac artery and vein to the level noted quickly   and obturator fossa.  F.  Left distal ureter for permanent section.  G.  Right distal ureter for permanent section.    INDICATIONS:  The patient is a 74-year-old woman who developed hematuria.  She   was diagnosed with muscle invasive bladder cancer, was evaluated and   discussed the treatment options due to persistent muscle invasion including   neoadjuvant chemotherapy, which she received.  The patient has been counseled   the option of bladder preservation with adjuvant radiation for surgery and   wished to proceed with surgery.  Prior to the surgery, we had several    discussions regarding the risk of magnitude of the procedure, risks including   but not limited to risk of wound infection in 2-3% of cases.  With the node   dissection, there is a risk of lymphocele formation as well as potential   injury to the obturator nerves with the inability to adduct the lower   extremities.  I explained in layman's terms what this is.  I explained to the   patient that she would need an anterior pelvic exenteration with the procedure   including removal of anterior vaginal wall, which can lead to the vaginal   shortening or narrowing with precluding sexual activity.  She is also aware of   the risks of bleeding with potential requirement of blood transfusion and the   risk of acquiring hepatitis B, C, or HIV with a transfusion, we discussed the   risk of injury to the rectum with the potential need for colostomy and with   the urinary diversion, there is always a risk of parastomal hernia, stomal   stenosis, risk of bowel obstruction as well as a potential risk of anastomotic   failure with subsequent sepsis and multisystem organ failure.  There also a   risk of postoperative complication such as deep vein thrombosis, pulmonary   embolism, aspiration pneumonia, heart attack, stroke and death.  Informed   consent was given to me by the patient to proceed.    DESCRIPTION IN DETAIL:  After informed consent, the patient was brought to the   operating room and placed in supine.  Bilateral sequential compression   devices were in place and activated.  A general endotracheal anesthetic   administered by Dr. Jayesh Barton in a balanced fashion and an arterial line was   positioned in the left radial artery.  The patient was positioned at low   lithotomy and the operative area was shaved, Betadine prepped and draped in   usual sterile fashion.  A sterile 16-Hungarian Lopez was inserted, balloon   inflated to 10 mL and at this point in time, a surgical time-out was called.    All members of the  operative team agree as to the patient's name, procedure to   be performed and we discussed the fact that she had received cefotetan with   no objections.  Attention was directed towards procedure.    I performed the procedure with loupe magnification.  I made an incision from   the midline at the pubic symphysis and extended up to the level of the   umbilicus with a curvilinear incision around the umbilicus just to the top   portion of the umbilicus.  Dissection was carried through the skin sharply   with a 10 blade scalpel.  Dissection was carried through Camper's fascia with   coagulation current cautery.  Dissection was carried down to the exact   midline.  The midline was identified and incised with a cautery.  The   posterior sheath was seen, was elevated and the peritoneum was entered   carefully.  Once this was entered, I proceeded to dissect up superiorly to the   level of the umbilicus.  Urachus was identified, it was doubly clamped,   divided.  The umbilical site was secured with a 3-0 silk suture.  The bladder   flap was then created with the peritoneum utilizing the tonsil as a retractor   and this was dissected down inferiorly.  At this point in time, attention was   directed towards mobilizing the ureters on both the left and right side.    Attention was first directed towards the white line of Toldt, which was taken   down on the patient's right side.  Dissection was carried down to the   retroperitoneal musculature, psoas minor tendon was identified, general   femoral nerve was identified and this allowed the ureter to be identified near   the bifurcation iliac vessels.  It was dissected down deep into the pelvis,   doubly clamped and divided.  The cut end was removed and submitted for frozen   section with the distal end marked with ink for pathology.  On the left side,   the line of Toldt was incised and the colon was reflected medially.    Dissection was carried down to the psoas muscle.  Psoas  minor tendon was   identified, the ureters identified and dissected deep down into the pelvis   where it was doubly clipped with right angle large clip applier, transected   and the specimen submitted, which also was reported negative.  At this point   in time, a Cassie retractor was positioned.  I performed an extensive pelvic   lymph node dissection on the right side, the anatomy was laid out nicely.  The   extent of the dissection was the level of the general femoral nerve.  All the   tissue from the bifurcation of aorta to the level of the general femoral   nerve and then the tissue surrounding the external iliac artery down to the   obturator fossa and the node of [place] was completely mobilized en bloc and   small Hemoclips, medium Hemoclips and cautery were used to hopefully prevent   lymphocele.  After removing this, nelson packet was submitted for permanent   section.  At this point in time, the left nodes would be more difficult to   perform.  Subsequently, attention was directed towards the exonerative   procedure was performed by placing a Cassie retractors.  Towels were   positioned placing the ureters out of harm's way and the pericolic gutter and   once adequate exposure was obtained, the space between the rectum and the   uterus was identified and incision was made.  A sponge stick was placed in the   vagina.  The vagina was marked with cautery at the level of the anterior   vaginal wall and at this point in time, attention was taken towards taking   down the peritoneum.  I did dissect down to the endopelvic fascia on both the   left and right side.  Endopelvic fascia was incised with coagulation current   and then the levator ani muscles were pushed posteriorly.  Once this was   mobilized, attention was taken down towards removing the superior vesicle   artery.  The round ligament on the left and right side were taken down as well   and I would note that I did take down the infundibulopelvic  ligaments with   the LigaSure on both the left and right side after the ureters had been   positioned out of harm's way.  Once these were taken down and the superior   vessels to the bladder had been taken down and round ligament and broad   ligaments, attention was directed toward entering the vagina with elevation.    I was able to get a LigaSure and taken down with the strip of the vagina, the   lateral vaginal tissue.  Hemostasis here was excellent.  This was performed on   the left and right side.  Care was taken to avoid any LigaSure ____ exchanged   to the rectum and with elevation, this was then performed.  The middle   vesical and inferior vesical arteries were also taken down.  On the right   side, the middle vesicle artery was identified and visualized, double clips   were placed and once this had been mobilized inferiorly, attention was   directed towards controlling the dorsal clitoral venous complex.  This was   controlled with a CT-2 needle and 2-0 Vicryl proximally and distally.  This   was then transected sharply and dissection was carried down to the level of   the urethra.  Urethra was incised.  The Lopez was elevated.  A clamp was   placed on the distal urethra to avoid any contamination of the pelvis from the   bladder and at this point in time, the posterior urethra was incised.  The   LigaSure was used to take down the remaining attachments of the anterior   vaginal wall.  I did use the cautery with coagulation current to come across   the vagina at the level of the ureteral transection.  Once this was removed,   the bladder was flipped from the right side to left side, remaining vaginal   wall was incised with cautery and attention was now directed towards removing   the specimen, which included the ovaries, tubes, uterus, anterior vaginal wall   as well as the bladder and urethra to the level of the external sphincter.    At this point in time, a small amount of bleeding was encountered in  the   dorsal venous complex of the clitoris, which was controlled with a CT-2 Vicryl   in an interrupted fashion.  Hemostasis was now excellent.  The only bleeding   identified was really the anterior vaginal wall.  I closed the anterior   vaginal wall.  I did incise it posteriorly slightly  it from the   rectal tissue to perform a clamshell to maneuver to make a more capacious   vagina, 2-0 Vicryls were used in a running locking fashion and after the   vaginal wall was closed, hemostasis was improved.  I irrigated the pelvis with   2 liters of warm normal saline.  I then proceeded to identify some bleeding   vessels on the anterior vaginal wall, which were controlled with a 3-0 Vicryl   on an SH needle.  General inspection then revealed excellent hemostasis.    Urethra was no longer bleeding.  Attention was now directed towards mobilizing   the descending colon away from the lateral side wall and a left pelvic lymph   node dissection was performed with the margins being the bifurcation of the   aorta down including some of the presacral tissue to the general femoral   nerve, the obturator fossa and node of [place].  Lymphatic channels were   clipped with medium, small and large Hemoclips.  Cautery was used at some of   the points and after this was completed, this was submitted en bloc to   pathology for permanent section.  At this point in time, general inspection of   the pelvis again showed minimal bleeding.  The estimated blood loss was   approximately 300-350 mL.  Surgiflo was placed in the obturator fossa on the   left and right side.  Ray-Rebecca's were positioned and attention now was directed   towards removing the laps in the abdomen and repositioning to perform the   ileal conduit urinary diversion.  Because of the patient's body habitus, the   ostomy was decided to be placed just slightly below the umbilicus on the   patient's right side, this was right-handed in the mid rectus.  This would    require slightly longer conduit than normal, she is slightly overweight.    Subsequently, the terminal ileum was identified.  The avascular Treves was   incised with cautery after backlighting the blood vessels and LigaSure was   used to take this down.  Approximately, a 17 cm ileal conduit was dissected   free.  The LigaSure was used on the mesentery, also on the ileum side and this   was performed and the NIKKO 55 stapler was used to transect both ends of the   bowel.  The bowel integrity was maintained by placing a silk suture to   position these in appropriate position.  Towels were placed.  Cautery was used   to incise the bowel.  The NIKKO 55 stapler was inserted in the bowel and fired   to create the anastomosis and the TA stapler was placed on the end for   occlusion.  The redundant tissue was resected.  There was no significant   bleeding.  Patency of the anastomosis was checked was about 3 cm in width,   clearly patent and the mesenteric trap was now closed with a 3-0 Vicryl.    After the mesenteric trap was closed, it would be noted that the conduit was   brought down into its anatomic position inferiorly.  The dependent portions in   the pelvis was oversewn, the staple line with 2-0 Vicryl suture and back and   forth technique to avoid staple exposure.  Attention was then directed towards   tunneling the left ureter underneath the mesentery of the colon below the   level of the inferior mesenteric artery.  This was brought across the ureter,   was then transected, a stay suture of 4-0 Vicryl was positioned, the ureter   was spatulated on the nonvascular site and this stay sutures positioned from   outside in position.  The distal ureter sent for permanent section and at this   point in time, the ileal conduit was opened on the urostomy site with sharp   scissors.  It was copiously irrigated.  Care was taken to avoid any spillage   in the abdomen.  After this was irrigated, the conduit was incised on the  left   side at the inferior portion.  The serosa was incised sharply as the mucosa.    The 4-0 Vicryl was placed with stay sutures now positioned anatomically   through the ileum from inside out position was tied down and then the wings of   the spatulation was secured with 4-0 Vicryl.  At this point in time, a   6-Armenian ureteral catheter was passed up through the ureter up to the level of   the left kidney.  Guidewire was withdrawn and this was tunneled through the   conduit and secured with a Miracle to avoid loss of the ureterointestinal   catheter.  At this point in time, 4-0 Vicryls were used in an interrupted   fashion and then the anastomosis was tested by filling the conduit.  There   were no leaks.  Attention was directed towards the right side with the same   anastomosis was performed.  A 6-Armenian ureteral catheter was also inserted in   the right kidney, brought out through the urostomy and at this point in time   after the conduit has been completed, again bulb syringe was used to irrigate   the conduit, there was no significant leakage noted and subsequently, the   level of the urostomy was identified using Kocher as elevated.  A skin   incision of approximately 2.5 cm was made.  The subcutaneous tissues are   defatted.  A 3-0 Vicryl sutures are placed, 2 of them in the fascia after a   small transverse incision was made in the fascia and then the peritoneum was   pierced with my fingers.  Two fingers were allowed to be passed and then I did   take down some of the mesentery about 2.5 cm on the urostomy terminal end.    Vascularity was maintained as it has a very odd appearance and then this was   tunneled through the abdomen and brought out a large stoma approximately 3 cm,   it was a rolled over.  A 2-0 Vicryl was positioned at the fascia at the level   of the urostomy.  Once these were positioned, the ureteral catheters were cut   to the appropriate length and secured with 4-0 Vicryl to the  urostomy.  I   then proceeded to place fascial sutures where I could not position superiorly   due to the body habitus on the abdominal wall through the peritoneum.  These   3-0 Vicryls were positioned at 3 different levels avoiding the mesentery.  At   this point in time, the rosebud had been performed and urine draining from the   rosebud and it was secured to the subcutaneous tissues with 3-0 Vicryl in an   interrupted fashion.  At this point in time, attention was directed towards   removing all laps, 2 Ray-Rebecca's positioned in the pelvis, the pelvis was   irrigated.  Hemostasis was evaluated and excellent.  Mesenteric trap had been   previously closed and at this point, sponge, instrument, and needle counts   were performed and were correct x2.  Attention was now directed towards   closure.  I made a stab incision in the right lower abdomen with the Bovie and   then tonsil was tunneled.  A 10 mm Vicente-Salcedo drain was positioned into   the right hemipelvis and placed to bulb drainage.  I secured the skin with a   3-0 nylon suture.  At this point in time, the bowel was returned to its   anatomic position and the omental drape was positioned over the bowel and the   closure was performed with a 0 PDS suture in a running fashion from an   inferior position to the level of the top of the umbilicus.  After the knot   was tied, it was buried with 3-0 Vicryl suture.  Copious irrigation of the   wound was performed and the skin was reapproximated with skin staples.  At the   end of the case, the sponge, instrument and needle counts were correct x2.    The patient tolerated the procedure well.  Both ureteral catheters seen   draining.  A urostomy appliance was now positioned and the patient was   awakened in the operating room and transferred to recovery room where she   arrived in stable condition.       ____________________________________     MD LUCIANO Dangelo / CARLOS    DD:  03/12/2019 07:31:51  DT:  03/12/2019  08:50:09    D#:  [id number]  Job#:  [id number]    cc: [facility]

## 2019-03-12 NOTE — WOUND TEAM
" [facility]  Inpatient Services  New Ostomy Management & Teaching    HPI:  Reviewed  PMH: Reviewed   SH: Reviewed    Subjective:  \"My daughter already left but she\"ll be back tomorrow..\"     Objective:  In bedside chair, moved to bed.         Ostomy type:  Urostomy      Stoma location:  RLQ         Stoma assessment:    Appearance:  Pink, budded with stents in place       Size:  1\"     Protrusion:  Budded         Output:  Min, yellow     MC jxn  Intact     Peristomal skin:  Intact      Ostomy Appliance (type and size):  2 piece 2 1/4 with paste ring       Interventions:  Patient chose to observe all steps and verbalized understanding.  Patient verbalized all steps prior to change.  Removed previous appliance and cleansed skin with warm wash cloth.  Cut barrier with template and cut barrier to fit.  Applied paste ring to barrier and applied to skin.  Applied pouch and closed end.    Marked catalog and went over supplies.      Pt education: Questions and concerns addressed    Evaluation:   Likely Dc tomorrow.  Daughter will be back in the morning.  Will meet with patient and daughter then for more teaching.  Patient open to OP clinic for ostomy check.     Plan: Ostomy nurses to continue to follow for ostomy needs and teaching     Anticipated discharge needs: Supplies, supplier information, possible HH or outpatient ostomy clinic     "

## 2019-03-12 NOTE — DISCHARGE INSTRUCTIONS
Discharge Instructions    Discharged to home by car with relative. Discharged via wheelchair, hospital escort: Yes.  Special equipment needed: Not Applicable     Be sure to schedule a follow-up appointment with your primary care doctor or any specialists as instructed.     Discharge Plan:   Diet Plan: Discussed  Activity Level: Discussed  Confirmed Follow up Appointment: Patient to Call and Schedule Appointment  Confirmed Symptoms Management: Discussed  Medication Reconciliation Updated: Yes  Influenza Vaccine Indication: Not indicated: Previously immunized this influenza season and > 8 years of age    I understand that a diet low in cholesterol, fat, and sodium is recommended for good health. Unless I have been given specific instructions below for another diet, I accept this instruction as my diet prescription.   Other diet: Regular    Special Instructions: None    · Is patient discharged on Warfarin / Coumadin?   No       Urostomy  Introduction  A urostomy is a surgical procedure that is done to create an opening (stoma) for urine to leave the body. This procedure may be done if a medical condition prevents the usual flow of urine into your bladder and out of your body. It may also be done if you have had your bladder removed.  During the surgery, the tubes that drain urine from your kidneys (ureters) are connected to a piece of intestine. This piece of intestine is attached to the stoma made in the front of your abdomen. A bag or pouch is fitted over the stoma to catch your urine.  Tell a health care provider about:  · Any allergies you have.  · All medicines you are taking, including vitamins, herbs, eye drops, creams, and over-the-counter medicines.  · Any problems you or family members have had with anesthetic medicines.  · Any blood disorders you have.  · Any surgeries you have had.  · Any medical conditions you have.  · Whether you are pregnant or may be pregnant.  What are the risks?  Generally, this is a  safe procedure. However, problems may occur, including:  · Infection.  · Bleeding.  · Allergic reactions to medicines.  · Damage to other structures or organs.  · Blood clot.  What happens before the procedure?  · Follow instructions from your health care provider about eating or drinking restrictions.  · Ask your health care provider about:  ¨ Changing or stopping your regular medicines. This is especially important if you are taking diabetes medicines or blood thinners.  ¨ Taking medicines such as aspirin and ibuprofen. These medicines can thin your blood. Do not take these medicines before your procedure if your health care provider instructs you not to.  · Do not use tobacco products, such as cigarettes, chewing tobacco, and e-cigarettes. If you need help quitting, ask your health care provider.  · Plan to have someone take you home after the procedure.  · You may have an exam or testing.  · You may be prescribed an oral bowel prep. This involves drinking a large amount of medicated liquid, starting the day before your surgery. The liquid will cause you to have multiple loose stools until your stool is almost clear or light green. This cleans out your colon in preparation for the surgery.  · Ask your health care provider how your surgical site will be marked or identified.  · You may be given antibiotic medicine to help prevent infection.  What happens during the procedure?  · To reduce your risk of infection:  ¨ Your health care team will wash or sanitize their hands.  ¨ Your skin will be washed with soap.  · An IV tube will be inserted into one of your veins.  · You will be given one or more of the following:  ¨ A medicine to help you relax (sedative).  ¨ A medicine to make you fall asleep (general anesthetic).  · A tube (nasogastric tube) may be put through your nose into your stomach. This will remove stomach fluids and help prevent nausea and vomiting.  · An incision will be made in your abdomen.  · A small  piece of intestine will be removed, and the remaining ends of intestine will be stitched (sutured) back together. The ureters will be attached to the small piece of intestine (ileal conduit).  · The ileal conduit will be brought up and attached to the abdominal wall, creating the stoma that urine can pass through.  · Small tubes (stents) may be placed in the ureters to make sure the flow of urine is not blocked if the ureters swell after surgery.  · The muscles of the abdomen will be sutured back together, and then the skin will be sutured or stapled. A bandage (dressing) may be used to cover the incision or incisions. A urostomy pouch will be attached to the stoma.  The procedure may vary among health care providers and hospitals.  What happens after the procedure?  · Your blood pressure, heart rate, breathing rate, and blood oxygen level will be monitored often until the medicines you were given have worn off.  · You will be given pain medicine as needed.  · You may continue to have the nasogastric tube in place until your intestines are working normally again.  · You will be encouraged to breathe deeply and to cough. This helps your lungs recover from surgery.  · At first, you may only be allowed to suck on ice chips. You will slowly advance to drinking clear fluids and then return to a normal diet.  · You will be taught how to care for the stoma and urostomy pouch.  This information is not intended to replace advice given to you by your health care provider. Make sure you discuss any questions you have with your health care provider.  Document Released: 01/13/2017 Document Revised: 05/25/2017 Document Reviewed: 08/30/2016  © 2017 Elsevier      Depression / Suicide Risk    As you are discharged from this [facility] facility, it is important to learn how to keep safe from harming yourself.    Recognize the warning signs:  · Abrupt changes in personality, positive or negative- including increase in energy    · Giving away possessions  · Change in eating patterns- significant weight changes-  positive or negative  · Change in sleeping patterns- unable to sleep or sleeping all the time   · Unwillingness or inability to communicate  · Depression  · Unusual sadness, discouragement and loneliness  · Talk of wanting to die  · Neglect of personal appearance   · Rebelliousness- reckless behavior  · Withdrawal from people/activities they love  · Confusion- inability to concentrate     If you or a loved one observes any of these behaviors or has concerns about self-harm, here's what you can do:  · Talk about it- your feelings and reasons for harming yourself  · Remove any means that you might use to hurt yourself (examples: pills, rope, extension cords, firearm)  · Get professional help from the community (Mental Health, Substance Abuse, psychological counseling)  · Do not be alone:Call your Safe Contact- someone whom you trust who will be there for you.  · Call your local CRISIS HOTLINE 586-1046 or 921-112-7765  · Call your local Children's Mobile Crisis Response Team [place] (429) 805-8055 or www.[employer] ([employer])  · Call the toll free National Suicide Prevention Hotlines   · National Suicide Prevention Lifeline 917-888-OFJJ (1598)  · National Hope Line Network 800-SUICIDE (101-3981)

## 2019-03-12 NOTE — DISCHARGE PLANNING
Anticipated Discharge Disposition: Home with Outpatient Services.    Action: LSW received request to schedule a Ostomy Appointment.  LSW to the [facility] and was instructed to have the patient call to schedule the appointment as the order indicates the appointment is to be schedule 3-4 weeks post discharge, DOUGIE fournier.    Barriers to Discharge: None.    Plan: Home.

## 2019-03-12 NOTE — PROGRESS NOTES
Bedside report received   Patient is alert and oriented   Resp even and unlabored  Denies needs or pain   Urostomy in place to left quadrant  PIV to left wrist intact  Midline abd incision,approximated,YENI  Call light within reach  Bed locked and in lowest position

## 2019-03-19 ENCOUNTER — HOSPITAL ENCOUNTER (OUTPATIENT)
Dept: RADIOLOGY | Facility: MEDICAL CENTER | Age: 74
End: 2019-03-19

## 2019-03-19 ENCOUNTER — HOSPITAL ENCOUNTER (INPATIENT)
Facility: MEDICAL CENTER | Age: 74
LOS: 3 days | DRG: 690 | End: 2019-03-22
Attending: EMERGENCY MEDICINE | Admitting: INTERNAL MEDICINE
Payer: MEDICARE

## 2019-03-19 DIAGNOSIS — G89.18 POST-OP PAIN: ICD-10-CM

## 2019-03-19 LAB
ALBUMIN SERPL BCP-MCNC: 3.2 G/DL (ref 3.2–4.9)
ALBUMIN/GLOB SERPL: 1.2 G/DL
ALP SERPL-CCNC: 60 U/L (ref 30–99)
ALT SERPL-CCNC: 23 U/L (ref 2–50)
ANION GAP SERPL CALC-SCNC: 9 MMOL/L (ref 0–11.9)
APPEARANCE UR: ABNORMAL
AST SERPL-CCNC: 25 U/L (ref 12–45)
BACTERIA #/AREA URNS HPF: ABNORMAL /HPF
BASOPHILS # BLD AUTO: 0.5 % (ref 0–1.8)
BASOPHILS # BLD: 0.06 K/UL (ref 0–0.12)
BILIRUB SERPL-MCNC: 0.2 MG/DL (ref 0.1–1.5)
BILIRUB UR QL STRIP.AUTO: NEGATIVE
BUN SERPL-MCNC: 15 MG/DL (ref 8–22)
CALCIUM SERPL-MCNC: 8.4 MG/DL (ref 8.5–10.5)
CHLORIDE SERPL-SCNC: 106 MMOL/L (ref 96–112)
CO2 SERPL-SCNC: 22 MMOL/L (ref 20–33)
COLOR UR: YELLOW
CREAT SERPL-MCNC: 1.1 MG/DL (ref 0.5–1.4)
EOSINOPHIL # BLD AUTO: 0.02 K/UL (ref 0–0.51)
EOSINOPHIL NFR BLD: 0.2 % (ref 0–6.9)
EPI CELLS #/AREA URNS HPF: NEGATIVE /HPF
ERYTHROCYTE [DISTWIDTH] IN BLOOD BY AUTOMATED COUNT: 43.3 FL (ref 35.9–50)
GLOBULIN SER CALC-MCNC: 2.6 G/DL (ref 1.9–3.5)
GLUCOSE SERPL-MCNC: 119 MG/DL (ref 65–99)
GLUCOSE UR STRIP.AUTO-MCNC: NEGATIVE MG/DL
HCT VFR BLD AUTO: 32 % (ref 37–47)
HGB BLD-MCNC: 10.6 G/DL (ref 12–16)
HYALINE CASTS #/AREA URNS LPF: ABNORMAL /LPF
IMM GRANULOCYTES # BLD AUTO: 0.15 K/UL (ref 0–0.11)
IMM GRANULOCYTES NFR BLD AUTO: 1.3 % (ref 0–0.9)
KETONES UR STRIP.AUTO-MCNC: ABNORMAL MG/DL
LACTATE BLD-SCNC: 0.9 MMOL/L (ref 0.5–2)
LEUKOCYTE ESTERASE UR QL STRIP.AUTO: ABNORMAL
LYMPHOCYTES # BLD AUTO: 0.9 K/UL (ref 1–4.8)
LYMPHOCYTES NFR BLD: 7.6 % (ref 22–41)
MCH RBC QN AUTO: 33.1 PG (ref 27–33)
MCHC RBC AUTO-ENTMCNC: 33.1 G/DL (ref 33.6–35)
MCV RBC AUTO: 100 FL (ref 81.4–97.8)
MICRO URNS: ABNORMAL
MONOCYTES # BLD AUTO: 0.66 K/UL (ref 0–0.85)
MONOCYTES NFR BLD AUTO: 5.5 % (ref 0–13.4)
NEUTROPHILS # BLD AUTO: 10.12 K/UL (ref 2–7.15)
NEUTROPHILS NFR BLD: 84.9 % (ref 44–72)
NITRITE UR QL STRIP.AUTO: POSITIVE
NRBC # BLD AUTO: 0 K/UL
NRBC BLD-RTO: 0 /100 WBC
PH UR STRIP.AUTO: 8 [PH]
PLATELET # BLD AUTO: 225 K/UL (ref 164–446)
PMV BLD AUTO: 9.1 FL (ref 9–12.9)
POTASSIUM SERPL-SCNC: 4.4 MMOL/L (ref 3.6–5.5)
PROT SERPL-MCNC: 5.8 G/DL (ref 6–8.2)
PROT UR QL STRIP: 100 MG/DL
RBC # BLD AUTO: 3.2 M/UL (ref 4.2–5.4)
RBC # URNS HPF: >150 /HPF
RBC UR QL AUTO: ABNORMAL
SODIUM SERPL-SCNC: 137 MMOL/L (ref 135–145)
SP GR UR STRIP.AUTO: 1.04
UROBILINOGEN UR STRIP.AUTO-MCNC: 0.2 MG/DL
WBC # BLD AUTO: 11.9 K/UL (ref 4.8–10.8)
WBC #/AREA URNS HPF: ABNORMAL /HPF

## 2019-03-19 PROCEDURE — 94760 N-INVAS EAR/PLS OXIMETRY 1: CPT

## 2019-03-19 PROCEDURE — 83605 ASSAY OF LACTIC ACID: CPT

## 2019-03-19 PROCEDURE — 81001 URINALYSIS AUTO W/SCOPE: CPT

## 2019-03-19 PROCEDURE — 99223 1ST HOSP IP/OBS HIGH 75: CPT | Mod: AI | Performed by: INTERNAL MEDICINE

## 2019-03-19 PROCEDURE — 700102 HCHG RX REV CODE 250 W/ 637 OVERRIDE(OP): Performed by: INTERNAL MEDICINE

## 2019-03-19 PROCEDURE — 700111 HCHG RX REV CODE 636 W/ 250 OVERRIDE (IP): Performed by: INTERNAL MEDICINE

## 2019-03-19 PROCEDURE — 87040 BLOOD CULTURE FOR BACTERIA: CPT | Mod: 91

## 2019-03-19 PROCEDURE — 700105 HCHG RX REV CODE 258: Performed by: INTERNAL MEDICINE

## 2019-03-19 PROCEDURE — 87086 URINE CULTURE/COLONY COUNT: CPT

## 2019-03-19 PROCEDURE — 770006 HCHG ROOM/CARE - MED/SURG/GYN SEMI*

## 2019-03-19 PROCEDURE — 96374 THER/PROPH/DIAG INJ IV PUSH: CPT

## 2019-03-19 PROCEDURE — 99285 EMERGENCY DEPT VISIT HI MDM: CPT

## 2019-03-19 PROCEDURE — 80053 COMPREHEN METABOLIC PANEL: CPT

## 2019-03-19 PROCEDURE — 85025 COMPLETE CBC W/AUTO DIFF WBC: CPT

## 2019-03-19 PROCEDURE — A9270 NON-COVERED ITEM OR SERVICE: HCPCS | Performed by: INTERNAL MEDICINE

## 2019-03-19 RX ORDER — SODIUM CHLORIDE 9 MG/ML
1000 INJECTION, SOLUTION INTRAVENOUS
Status: DISCONTINUED | OUTPATIENT
Start: 2019-03-19 | End: 2019-03-22 | Stop reason: HOSPADM

## 2019-03-19 RX ORDER — ONDANSETRON 4 MG/1
4 TABLET, ORALLY DISINTEGRATING ORAL EVERY 4 HOURS PRN
Status: DISCONTINUED | OUTPATIENT
Start: 2019-03-19 | End: 2019-03-22 | Stop reason: HOSPADM

## 2019-03-19 RX ORDER — POLYETHYLENE GLYCOL 3350 17 G/17G
1 POWDER, FOR SOLUTION ORAL
Status: DISCONTINUED | OUTPATIENT
Start: 2019-03-19 | End: 2019-03-22 | Stop reason: HOSPADM

## 2019-03-19 RX ORDER — BISACODYL 10 MG
10 SUPPOSITORY, RECTAL RECTAL
Status: DISCONTINUED | OUTPATIENT
Start: 2019-03-19 | End: 2019-03-22 | Stop reason: HOSPADM

## 2019-03-19 RX ORDER — OXYCODONE HYDROCHLORIDE 10 MG/1
10 TABLET ORAL
Status: DISCONTINUED | OUTPATIENT
Start: 2019-03-19 | End: 2019-03-22 | Stop reason: HOSPADM

## 2019-03-19 RX ORDER — ATORVASTATIN CALCIUM 20 MG/1
20 TABLET, FILM COATED ORAL NIGHTLY
Status: DISCONTINUED | OUTPATIENT
Start: 2019-03-19 | End: 2019-03-22 | Stop reason: HOSPADM

## 2019-03-19 RX ORDER — SODIUM CHLORIDE 9 MG/ML
INJECTION, SOLUTION INTRAVENOUS CONTINUOUS
Status: DISCONTINUED | OUTPATIENT
Start: 2019-03-19 | End: 2019-03-21

## 2019-03-19 RX ORDER — AMOXICILLIN 250 MG
2 CAPSULE ORAL 2 TIMES DAILY
Status: DISCONTINUED | OUTPATIENT
Start: 2019-03-19 | End: 2019-03-22 | Stop reason: HOSPADM

## 2019-03-19 RX ORDER — ACETAMINOPHEN 325 MG/1
650 TABLET ORAL EVERY 6 HOURS PRN
Status: DISCONTINUED | OUTPATIENT
Start: 2019-03-19 | End: 2019-03-22 | Stop reason: HOSPADM

## 2019-03-19 RX ORDER — OXYCODONE HYDROCHLORIDE 5 MG/1
5 TABLET ORAL
Status: DISCONTINUED | OUTPATIENT
Start: 2019-03-19 | End: 2019-03-22 | Stop reason: HOSPADM

## 2019-03-19 RX ORDER — ONDANSETRON 2 MG/ML
4 INJECTION INTRAMUSCULAR; INTRAVENOUS EVERY 4 HOURS PRN
Status: DISCONTINUED | OUTPATIENT
Start: 2019-03-19 | End: 2019-03-22 | Stop reason: HOSPADM

## 2019-03-19 RX ORDER — TRAMADOL HYDROCHLORIDE 50 MG/1
25-50 TABLET ORAL EVERY 4 HOURS PRN
COMMUNITY
End: 2022-06-29

## 2019-03-19 RX ORDER — CARVEDILOL 6.25 MG/1
12.5 TABLET ORAL 2 TIMES DAILY WITH MEALS
Status: DISCONTINUED | OUTPATIENT
Start: 2019-03-19 | End: 2019-03-22 | Stop reason: HOSPADM

## 2019-03-19 RX ORDER — MORPHINE SULFATE 4 MG/ML
4 INJECTION, SOLUTION INTRAMUSCULAR; INTRAVENOUS
Status: DISCONTINUED | OUTPATIENT
Start: 2019-03-19 | End: 2019-03-22 | Stop reason: HOSPADM

## 2019-03-19 RX ADMIN — SODIUM CHLORIDE: 9 INJECTION, SOLUTION INTRAVENOUS at 21:16

## 2019-03-19 RX ADMIN — OXYCODONE HYDROCHLORIDE 10 MG: 5 TABLET ORAL at 21:10

## 2019-03-19 RX ADMIN — ONDANSETRON 4 MG: 2 INJECTION INTRAMUSCULAR; INTRAVENOUS at 21:14

## 2019-03-19 RX ADMIN — CARVEDILOL 12.5 MG: 6.25 TABLET, FILM COATED ORAL at 22:18

## 2019-03-19 ASSESSMENT — COPD QUESTIONNAIRES
HAVE YOU SMOKED AT LEAST 100 CIGARETTES IN YOUR ENTIRE LIFE: YES
DO YOU EVER COUGH UP ANY MUCUS OR PHLEGM?: NO/ONLY WITH OCCASIONAL COLDS OR INFECTIONS
COPD SCREENING SCORE: 4
DURING THE PAST 4 WEEKS HOW MUCH DID YOU FEEL SHORT OF BREATH: NONE/LITTLE OF THE TIME

## 2019-03-19 ASSESSMENT — LIFESTYLE VARIABLES
EVER_SMOKED: YES
DO YOU DRINK ALCOHOL: NO
EVER_SMOKED: YES

## 2019-03-20 PROBLEM — D53.9 MACROCYTIC ANEMIA: Status: ACTIVE | Noted: 2019-03-20

## 2019-03-20 PROBLEM — C67.9 BLADDER CANCER (HCC): Status: ACTIVE | Noted: 2019-03-20

## 2019-03-20 PROBLEM — N13.1 HYDRONEPHROSIS DUE TO OBSTRUCTION OF URETER: Status: ACTIVE | Noted: 2019-03-20

## 2019-03-20 PROBLEM — N17.9 AKI (ACUTE KIDNEY INJURY) (HCC): Status: ACTIVE | Noted: 2019-03-20

## 2019-03-20 PROBLEM — N12 PYELONEPHRITIS: Status: ACTIVE | Noted: 2019-03-20

## 2019-03-20 LAB
ANION GAP SERPL CALC-SCNC: 7 MMOL/L (ref 0–11.9)
BASOPHILS # BLD AUTO: 0.7 % (ref 0–1.8)
BASOPHILS # BLD: 0.06 K/UL (ref 0–0.12)
BUN SERPL-MCNC: 14 MG/DL (ref 8–22)
CALCIUM SERPL-MCNC: 8.2 MG/DL (ref 8.5–10.5)
CHLORIDE SERPL-SCNC: 109 MMOL/L (ref 96–112)
CO2 SERPL-SCNC: 24 MMOL/L (ref 20–33)
CREAT SERPL-MCNC: 1.01 MG/DL (ref 0.5–1.4)
EOSINOPHIL # BLD AUTO: 0.08 K/UL (ref 0–0.51)
EOSINOPHIL NFR BLD: 0.9 % (ref 0–6.9)
ERYTHROCYTE [DISTWIDTH] IN BLOOD BY AUTOMATED COUNT: 45.1 FL (ref 35.9–50)
GLUCOSE SERPL-MCNC: 115 MG/DL (ref 65–99)
HCT VFR BLD AUTO: 30.2 % (ref 37–47)
HGB BLD-MCNC: 9.5 G/DL (ref 12–16)
IMM GRANULOCYTES # BLD AUTO: 0.09 K/UL (ref 0–0.11)
IMM GRANULOCYTES NFR BLD AUTO: 1 % (ref 0–0.9)
LYMPHOCYTES # BLD AUTO: 1.55 K/UL (ref 1–4.8)
LYMPHOCYTES NFR BLD: 17.9 % (ref 22–41)
MCH RBC QN AUTO: 32.2 PG (ref 27–33)
MCHC RBC AUTO-ENTMCNC: 31.5 G/DL (ref 33.6–35)
MCV RBC AUTO: 102.4 FL (ref 81.4–97.8)
MONOCYTES # BLD AUTO: 0.95 K/UL (ref 0–0.85)
MONOCYTES NFR BLD AUTO: 11 % (ref 0–13.4)
NEUTROPHILS # BLD AUTO: 5.91 K/UL (ref 2–7.15)
NEUTROPHILS NFR BLD: 68.5 % (ref 44–72)
NRBC # BLD AUTO: 0 K/UL
NRBC BLD-RTO: 0 /100 WBC
PLATELET # BLD AUTO: 216 K/UL (ref 164–446)
PMV BLD AUTO: 9.5 FL (ref 9–12.9)
POTASSIUM SERPL-SCNC: 3.7 MMOL/L (ref 3.6–5.5)
RBC # BLD AUTO: 2.95 M/UL (ref 4.2–5.4)
SODIUM SERPL-SCNC: 140 MMOL/L (ref 135–145)
WBC # BLD AUTO: 8.6 K/UL (ref 4.8–10.8)

## 2019-03-20 PROCEDURE — 99232 SBSQ HOSP IP/OBS MODERATE 35: CPT | Performed by: HOSPITALIST

## 2019-03-20 PROCEDURE — A9270 NON-COVERED ITEM OR SERVICE: HCPCS | Performed by: INTERNAL MEDICINE

## 2019-03-20 PROCEDURE — 80048 BASIC METABOLIC PNL TOTAL CA: CPT

## 2019-03-20 PROCEDURE — 36415 COLL VENOUS BLD VENIPUNCTURE: CPT

## 2019-03-20 PROCEDURE — 700111 HCHG RX REV CODE 636 W/ 250 OVERRIDE (IP): Performed by: INTERNAL MEDICINE

## 2019-03-20 PROCEDURE — 700105 HCHG RX REV CODE 258: Performed by: INTERNAL MEDICINE

## 2019-03-20 PROCEDURE — 700111 HCHG RX REV CODE 636 W/ 250 OVERRIDE (IP): Performed by: HOSPITALIST

## 2019-03-20 PROCEDURE — 770006 HCHG ROOM/CARE - MED/SURG/GYN SEMI*

## 2019-03-20 PROCEDURE — 700102 HCHG RX REV CODE 250 W/ 637 OVERRIDE(OP): Performed by: INTERNAL MEDICINE

## 2019-03-20 PROCEDURE — 85025 COMPLETE CBC W/AUTO DIFF WBC: CPT

## 2019-03-20 RX ADMIN — OXYCODONE HYDROCHLORIDE 5 MG: 5 TABLET ORAL at 18:47

## 2019-03-20 RX ADMIN — OXYCODONE HYDROCHLORIDE 5 MG: 5 TABLET ORAL at 10:03

## 2019-03-20 RX ADMIN — ONDANSETRON 4 MG: 2 INJECTION INTRAMUSCULAR; INTRAVENOUS at 05:48

## 2019-03-20 RX ADMIN — CEFTRIAXONE SODIUM 1 G: 1 INJECTION, POWDER, FOR SOLUTION INTRAMUSCULAR; INTRAVENOUS at 05:46

## 2019-03-20 RX ADMIN — CARVEDILOL 12.5 MG: 6.25 TABLET, FILM COATED ORAL at 18:47

## 2019-03-20 RX ADMIN — SENNOSIDES AND DOCUSATE SODIUM 2 TABLET: 8.6; 5 TABLET ORAL at 05:48

## 2019-03-20 RX ADMIN — SODIUM CHLORIDE: 9 INJECTION, SOLUTION INTRAVENOUS at 05:45

## 2019-03-20 RX ADMIN — SENNOSIDES AND DOCUSATE SODIUM 2 TABLET: 8.6; 5 TABLET ORAL at 18:47

## 2019-03-20 RX ADMIN — SODIUM CHLORIDE: 9 INJECTION, SOLUTION INTRAVENOUS at 09:02

## 2019-03-20 RX ADMIN — ENOXAPARIN SODIUM 40 MG: 100 INJECTION SUBCUTANEOUS at 12:36

## 2019-03-20 RX ADMIN — ATORVASTATIN CALCIUM 20 MG: 20 TABLET, FILM COATED ORAL at 20:18

## 2019-03-20 ASSESSMENT — ENCOUNTER SYMPTOMS
VOMITING: 0
SORE THROAT: 0
DOUBLE VISION: 0
WHEEZING: 0
NERVOUS/ANXIOUS: 0
BLOOD IN STOOL: 0
DIAPHORESIS: 0
HEADACHES: 0
MYALGIAS: 0
FLANK PAIN: 1
PALPITATIONS: 0
SHORTNESS OF BREATH: 0
CHILLS: 1
BACK PAIN: 0
INSOMNIA: 0
NECK PAIN: 0
BRUISES/BLEEDS EASILY: 0
FEVER: 1
DIARRHEA: 0
ABDOMINAL PAIN: 0
SPUTUM PRODUCTION: 0
FOCAL WEAKNESS: 0
FEVER: 0
FALLS: 0
SEIZURES: 0
NAUSEA: 0
WEAKNESS: 1
COUGH: 0
DIZZINESS: 0
BLURRED VISION: 0
MEMORY LOSS: 0
CHILLS: 0

## 2019-03-20 ASSESSMENT — COGNITIVE AND FUNCTIONAL STATUS - GENERAL
CLIMB 3 TO 5 STEPS WITH RAILING: A LITTLE
DAILY ACTIVITIY SCORE: 23
SUGGESTED CMS G CODE MODIFIER DAILY ACTIVITY: CI
WALKING IN HOSPITAL ROOM: A LITTLE
DRESSING REGULAR LOWER BODY CLOTHING: A LITTLE
MOBILITY SCORE: 20
MOVING FROM LYING ON BACK TO SITTING ON SIDE OF FLAT BED: A LITTLE
SUGGESTED CMS G CODE MODIFIER MOBILITY: CJ
STANDING UP FROM CHAIR USING ARMS: A LITTLE

## 2019-03-20 NOTE — PROGRESS NOTES
Report Received from ER RN, assumed care @2200  Pt ambulated to bed side with 1 assist, family at bed side  A/O x 4  VSS  Pain-4/10 L flank, declines intervention  O2-2L NC  Diet- cardiac, denies N/V  Void- pos urostomy RLQ  BM-pta 3/19  Wound- RLQ Urostomy, CDI.Well healed MLI from old surgery  Bed Locked in Lowest Position  Call light in reach

## 2019-03-20 NOTE — H&P
Hospital Medicine History & Physical Note    Date of Service  3/19/2019    Primary Care Physician  Anthony Oh M.D.    Consultants  Urology Dr. Ritter    Code Status  Full code    Chief Complaint  Left flank pain    History of Presenting Illness  74 y.o. female with a past medical history of bladder cancer status post neoadjuvant chemotherapy and anterior pelvic exenteration on 3/7/19 by Dr. Ritter who presented 3/19/2019 with left flank pain.  Apparently the patient had a left ureteral catheter removed today by her urologist at around 11 AM.  3 hours later she developed nausea with left flank pain and a fever of 101.4 for which she presented to the ER.  Patient was diagnosed with muscle invasive bladder cancer for which she underwent neoadjuvant chemotherapy followed by anterior pelvic exenteration, extended pelvic lymphadenectomy and ileal conduit urinary diversion on 3/7/19.  Patient reported severe flank pain for which she was given fentanyl en route to the hospital due to which she is currently somnolent but arousable.  She denies any chest pain, headache, shortness of breath or diarrhea.  The ER physician consulted Dr Ritter who recommended IV antibiotics and IV fluids, he will see her in the morning.        Review of Systems  Review of Systems   Constitutional: Positive for chills and fever. Negative for diaphoresis.   HENT: Negative for hearing loss and sore throat.    Eyes: Negative for blurred vision.   Respiratory: Negative for cough, sputum production, shortness of breath and wheezing.    Cardiovascular: Negative for chest pain, palpitations and leg swelling.   Gastrointestinal: Negative for abdominal pain, blood in stool, diarrhea, nausea and vomiting.   Genitourinary: Positive for flank pain. Negative for dysuria and urgency.   Musculoskeletal: Negative for back pain, joint pain, myalgias and neck pain.   Skin: Negative for rash.   Neurological: Negative for dizziness, focal weakness, seizures and  headaches.   Endo/Heme/Allergies: Does not bruise/bleed easily.   Psychiatric/Behavioral: Negative for suicidal ideas.   All other systems reviewed and are negative.      Past Medical History   has a past medical history of Arthritis; Blood clotting disorder ([facility]) (08/2018); Cancer ([facility]) (08/2018); Cancer ([facility]) (1980); Dental disorder; High cholesterol; Hypertension; Renal disorder; Urinary bladder disorder; and Urinary incontinence.    Surgical History   has a past surgical history that includes other (08/2018); other; tonsillectomy; pelvic exenteration (3/7/2019); lymphadenectomy (Bilateral, 3/7/2019); urethrectomy (3/7/2019); and ileo loop diversion (3/7/2019).     Family History  No pertinent family history    Social History   reports that she quit smoking about 36 years ago. Her smoking use included Cigarettes. She has a 15.00 pack-year smoking history. She has never used smokeless tobacco. She reports that she does not drink alcohol or use drugs.    Allergies  No Known Allergies    Medications  Prior to Admission Medications   Prescriptions Last Dose Informant Patient Reported? Taking?   atorvastatin (LIPITOR) 20 MG Tab  Patient Yes No   Sig: Take 20 mg by mouth every evening.   carvedilol (COREG) 12.5 MG Tab  Patient Yes No   Sig: Take 12.5 mg by mouth 2 times a day, with meals.   docusate sodium (COLACE) 100 MG Cap   No No   Sig: Take 1 Cap by mouth 2 times a day.   enoxaparin (LOVENOX) 30 MG/0.3ML Solution inj   No No   Sig: Inject 0.3 mL as instructed every 12 hours.   enoxaparin (LOVENOX) 40 MG/0.4ML Solution inj   No No   Sig: Inject 40 mg as instructed every day for 25 days.   oxyCODONE immediate-release (ROXICODONE) 5 MG Tab   No No   Sig: Take 0.5 Tabs by mouth every 6 hours as needed for Severe Pain for up to 7 days.      Facility-Administered Medications: None       Physical Exam  Temp:  [36.8 °C (98.2 °F)] 36.8 °C (98.2 °F)  Pulse:  [73-83] 73  Resp:  [16] 16  SpO2:  [95 %-97 %] 97 %    Physical  Exam   Constitutional: She is oriented to person, place, and time. She appears well-developed and well-nourished. No distress.   HENT:   Head: Normocephalic and atraumatic.   Mouth/Throat: Oropharynx is clear and moist.   Eyes: Pupils are equal, round, and reactive to light. Conjunctivae are normal.   Neck: Neck supple. No thyromegaly present.   Cardiovascular: Normal rate, regular rhythm and normal heart sounds.    Pulmonary/Chest: Effort normal and breath sounds normal. No respiratory distress. She has no wheezes. She has no rales.   Abdominal: Soft. Bowel sounds are normal. She exhibits no distension. There is no tenderness. There is no rebound.   Left CVA tenderness    Right lower quadrant urostomy in place   Musculoskeletal: Normal range of motion. She exhibits no edema or tenderness.   Neurological: She is oriented to person, place, and time. No cranial nerve deficit. Coordination normal.   Somnolent but arousable  Strength and sensation intact bilaterally   Skin: Skin is warm and dry.   Psychiatric: She has a normal mood and affect. Her behavior is normal.   Nursing note and vitals reviewed.      Laboratory:          No results for input(s): ALTSGPT, ASTSGOT, ALKPHOSPHAT, TBILIRUBIN, DBILIRUBIN, GAMMAGT, AMYLASE, LIPASE, ALB, PREALBUMIN, GLUCOSE in the last 72 hours.              No results for input(s): TROPONINI in the last 72 hours.    Urinalysis:    Recent Labs      03/19/19   1841   SPECGRAVITY  1.045   GLUCOSEUR  Negative   KETONES  Trace*   NITRITE  Positive*   LEUKESTERAS  Small*   WBCURINE  *   RBCURINE  >150*   BACTERIA  Few*   EPITHELCELL  Negative        Imaging:  OUTSIDE IMAGES-CT ABDOMEN /PELVIS   Final Result            Assessment/Plan:  I anticipate this patient will require at least two midnights for appropriate medical management, necessitating inpatient admission.    Pyelonephritis- (present on admission)   Assessment & Plan    Patient has been started on IV ceftriaxone  Follow urine  culture  Pain control with oxycodone and IV morphine, monitor respiratory status closely     Hydronephrosis due to obstruction of ureter- (present on admission)   Assessment & Plan    Urology consulted  IV fluid hydration with normal saline  Monitor BMP     Bladder cancer (HCC)- (present on admission)   Assessment & Plan    Muscle invasive bladder cancer for which she underwent neoadjuvant chemotherapy followed by anterior pelvic exenteration, extended pelvic lymphadenectomy and ileal conduit urinary diversion on 3/7/19         VTE prophylaxis: scd

## 2019-03-20 NOTE — CARE PLAN
Problem: Communication  Goal: The ability to communicate needs accurately and effectively will improve  Outcome: PROGRESSING AS EXPECTED  Pt uses call light effectively    Problem: Pain Management  Goal: Pain level will decrease to patient's comfort goal  Outcome: PROGRESSING AS EXPECTED  Pt requires only PO pain medication at this time

## 2019-03-20 NOTE — ED NOTES
Med rec updated and complete.  Allergies reviewed.  Pt denies oral antibiotic use in last 30 days at home.  All morning doses taken.

## 2019-03-20 NOTE — ED TRIAGE NOTES
.  Chief Complaint   Patient presents with   • Flank Pain     Left   • N/V   Pt BIB EMS - transfer from [facility].  [facility] - [facility] uretal stent and abdominal staples removal.  Pt had cystectomy on 2/6/19 for bladder cancer - urostomy placed.  Pt developed N/V and left flank pain after arriving home this morning.  Pt received antibiotics, antiemetics, and pain medication PTA.

## 2019-03-20 NOTE — ED NOTES
Dysphagia screening preformed and passed.   Pt medicated for 8/10 pain with oxycodone (see MAR). Fluids started (see MAR).

## 2019-03-20 NOTE — ED PROVIDER NOTES
ED Provider Note      HPI: Patient is a 74-year-old female who presented to the emergency department by ambulance transfer March 19, 2019 at 5:39 PM with a chief complaint of flank pain and fever.    Patient had a left ureteral stent removed today by her urologist at about 11 AM.  3 hours later she presented to another facility with left flank pain and abdominal pain.  She also was noted to have a fever 101.4.  She underwent cystectomy on February 6 for bladder cancer and urostomy tube was placed.  At the other facility the patient had numerous abnormalities found (see below) and she was transferred here for further care.  She was given 2 g of Rocephin Zofran and fentanyl prior to transfer.  On arrival here she appears to be reasonably comfortable.  She was sleeping but arousable.  She still has some flank pain.  No cough vomiting or diarrhea.  No other somatic complaint    Review of Systems: Positive left flank pain fever negative for cough vomiting diarrhea.  Review of systems reviewed with patient, all other systems negative    Past medical/surgical history: Bladder cancer status post cystectomy urostomy tube removal today hypertension high cholesterol breast cancer    Medications: Atorvastatin Coreg Roxicodone Lipitor    Allergies: None    Social History: No drug or alcohol use patient smoked 1 pack of cigarettes per day for 15 years quit in 1983      Physical exam: Constitutional: Well-developed well-nourished female appeared tired  Vital signs: Pulse 73 respirations 16 pulse oximetry 97% blood pressure 146/69 pulse oximetry 95  EYES: PERRL, EOMI, Conjunctivae and sclera normal, eyelids normal bilaterally.  Neck: Trachea midline. No cervical masses seen or palpated. Normal range of motion, supple. No meningeal signs elicited.  Cardiac: Regular rate and rhythm. S1-S2 present. No S3 or S4 present. No murmurs, rubs, or gallops heard. No edema or varicosities were seen.   Lungs: Clear to auscultation with good  aeration throughout. No wheezes, rales, or rhonchi heard. Patient's chest wall moved symmetrically with each respiratory effort. Patient was not making use of accessory muscles of respiration in breathing.  Abdomen: Soft nontender to palpation.  Subjective pain in the left flank area that is not increased with palpation.  No rebound or guarding elicited. No organomegaly identified. No pulsatile abdominal masses identified.   Musculoskeletal:  no  pain with palpitation or movement of muscle, bone or joint , no obvious musculoskeletal deformities identified.  Neurologic: alert and awake answers questions appropriately. Moves all four extremities independently, no gross focal abnormalities identified. Normal strength and motor.  Skin: no rash or lesion seen, no palpable dermatologic lesions identified.  Psychiatric: not anxious, delusional, or hallucinating.    Medical decision making: I reviewed the studies from the other facility.  Laboratory studies were significant for white count of 10.8.  BUN and creatinine were normal.  Urinalysis was positive for nitrites leukocyte esterase 11-30 white cells 11-30 red cells 3+ bacteria.  Clumps of white cells were also noted.  CT imaging showed large left-sided hydronephrosis and ureteral obstruction with stranding in the left kidney concerning for an infectious process.  A moderate amount of free fluid in the pelvis was noted.    Urinalysis obtained here,  white cells greater than 50 red cells positive for nitrites and leukocyte esterase.  Culture is sent    Antibiotic started per pharmacy protocol.    Urology immediately contacted, Dr. Ritter reviewed the studies and results.  He request the patient be admitted by hospitalist service for antibiotic treatment.  At this time he did not think further stenting or other procedure indicated.    Patient does appear to be infected at this time.  However vital signs are reassuring, she is not tachycardic or hypotensive.  She  received 2 mg of Rocephin at the other facility and we will continue antibiotic treatment here.  Urology will see the patient in house.  Further care and hospital course per attending physician summary    Impression pyelonephritis

## 2019-03-20 NOTE — ASSESSMENT & PLAN NOTE
-s/p neoadjuvant chemotherapy followed by anterior pelvic exenteration, extended pelvic lymphadenectomy and ileal conduit urinary diversion on 3/7/19  -maintain OP FU

## 2019-03-20 NOTE — PROGRESS NOTES
"Urology Progress Note    Patient seen and examined.     S: 74 y.o. Female with stage [id number] bladder cancer status post cystectomy.  She was seen in the office with Dr Ritter on 3/19 for ureteral catheter removal.  She developed severe pain and malaise, therefore she presented to the ER.   Creat normal.  CT shows mild hydro.  No fevers, chills, nausea or vomiting.  Today, she states the pain has resolved.  She is on Rocephin pending culture.     O:   Blood pressure 102/44, pulse 65, temperature 36.6 °C (97.9 °F), temperature source Temporal, resp. rate 16, height 1.575 m (5' 2\"), weight 69.5 kg (153 lb 3.5 oz), SpO2 100 %, not currently breastfeeding.  Recent Labs      03/19/19 2041 03/20/19   0614   SODIUM  137  140   POTASSIUM  4.4  3.7   CHLORIDE  106  109   CO2  22  24   GLUCOSE  119*  115*   BUN  15  14   CREATININE  1.10  1.01   CALCIUM  8.4*  8.2*     Recent Labs      03/19/19 2041 03/20/19   0614   WBC  11.9*  8.6   RBC  3.20*  2.95*   HEMOGLOBIN  10.6*  9.5*   HEMATOCRIT  32.0*  30.2*   MCV  100.0*  102.4*   MCH  33.1*  32.2   MCHC  33.1*  31.5*   RDW  43.3  45.1   PLATELETCT  225  216   MPV  9.1  9.5         Intake/Output Summary (Last 24 hours) at 03/20/19 1052  Last data filed at 03/20/19 0900   Gross per 24 hour   Intake           913.33 ml   Output              945 ml   Net           -31.67 ml       Exam:  Abdomen soft, benign.  Incisions clean, dry, intact. Urostomy in RLQ draining clear yellow urine.       A/P:    Active Hospital Problems    Diagnosis   • Pyelonephritis [N12]     Priority: High   • Hydronephrosis due to obstruction of ureter [N13.2]     Priority: Medium   • Bladder cancer (HCC) [C67.9]   • Macrocytic anemia [D53.9]   • BENJAMIN (acute kidney injury) (HCC) [N17.9]       Stable.     PLAN:  1.  Ambulate, IS.  2.  Treat infection per culture results  3.  Urology will sign off.  Call with questions.   "

## 2019-03-20 NOTE — ASSESSMENT & PLAN NOTE
-Urology has evaluated, no new interventions, treat infection  -urine culture negative to date  -pain controlled  -IV ABX to PO today

## 2019-03-20 NOTE — ED NOTES
Report received from Aamir CONSTANTINO.    called to draw blood.   Whiteboard updated. Family aware of POC. Pt lethargic from pain medication, responds to sternal rub. Call light within reach, family bedside.

## 2019-03-20 NOTE — PROGRESS NOTES
[facility] Medicine Daily Progress Note    Date of Service  3/20/2019    Chief Complaint  74 y.o. female admitted 3/19/2019 with left-sided flank pain.     Hospital Course   This is a 74 year old female with PMH bladder cancer s/p neoadjuvant chemotherapy and anterior pelvic exenteration on 3/7/19 by Dr. Ritter. Morning of presentation she had a left ureteral catheter removed. About 3 hours later she developed severe left flank pain and fever.      Interval Problem Update  -pain significantly improved  -Urology has seen her and signed off. Treat urine and FU as OP.    Consultants/Specialty  Urology    Code Status  FULL    Disposition  DC home pending urine culture    Review of Systems  Review of Systems   Constitutional: Positive for malaise/fatigue. Negative for chills and fever.   HENT: Negative.    Eyes: Negative for blurred vision and double vision.   Respiratory: Negative for cough and shortness of breath.    Cardiovascular: Negative for chest pain and palpitations.   Gastrointestinal: Negative for abdominal pain, diarrhea, nausea and vomiting.   Genitourinary: Positive for flank pain (significantly improved). Negative for hematuria.        Urostomy    Musculoskeletal: Negative for falls and joint pain.   Skin: Negative.    Neurological: Positive for weakness. Negative for dizziness and headaches.   Psychiatric/Behavioral: Negative for memory loss. The patient is not nervous/anxious and does not have insomnia.    All other systems reviewed and are negative.       Physical Exam  Temp:  [36.4 °C (97.6 °F)-36.8 °C (98.2 °F)] 36.6 °C (97.9 °F)  Pulse:  [65-83] 65  Resp:  [16-18] 16  BP: (102-147)/(44-65) 102/44  SpO2:  [95 %-100 %] 100 %    Physical Exam   Constitutional: She is oriented to person, place, and time. Vital signs are normal. She appears well-developed and well-nourished. She is sleeping. She is easily aroused. She appears ill. No distress.   Pain 3/10. Sleeps when undisturbed   HENT:   Head:  Normocephalic.   Eyes: Conjunctivae and EOM are normal. No scleral icterus.   Neck: Phonation normal.   Cardiovascular: Intact distal pulses.  Exam reveals distant heart sounds.    Murmur heard.  No edema   Pulmonary/Chest: Effort normal and breath sounds normal. She has no wheezes. She has no rhonchi.   Abdominal: Soft. Normal appearance and bowel sounds are normal. She exhibits no distension. There is no tenderness. There is no rigidity.   Ostomy with clear yellow urine to drainage bag   Genitourinary:   Genitourinary Comments: Ostomy   Musculoskeletal: Normal range of motion.   Neurological: She is oriented to person, place, and time and easily aroused. She has normal strength.   Skin: Skin is warm and dry. No rash noted.   Psychiatric: Her behavior is normal. Judgment normal. Cognition and memory are normal.   Nursing note and vitals reviewed.      Fluids    Intake/Output Summary (Last 24 hours) at 03/20/19 1425  Last data filed at 03/20/19 0930   Gross per 24 hour   Intake          1033.33 ml   Output              945 ml   Net            88.33 ml       Laboratory  Recent Labs      03/19/19 2041 03/20/19   0614   WBC  11.9*  8.6   RBC  3.20*  2.95*   HEMOGLOBIN  10.6*  9.5*   HEMATOCRIT  32.0*  30.2*   MCV  100.0*  102.4*   MCH  33.1*  32.2   MCHC  33.1*  31.5*   RDW  43.3  45.1   PLATELETCT  225  216   MPV  9.1  9.5     Recent Labs      03/19/19 2041 03/20/19   0614   SODIUM  137  140   POTASSIUM  4.4  3.7   CHLORIDE  106  109   CO2  22  24   GLUCOSE  119*  115*   BUN  15  14   CREATININE  1.10  1.01   CALCIUM  8.4*  8.2*                   Imaging  OUTSIDE IMAGES-CT ABDOMEN /PELVIS   Final Result           Assessment/Plan  Pyelonephritis- (present on admission)   Assessment & Plan    -Urology has evaluated, no new interventions, treat infection  -urine culture pending  -pain controlled - significantly improved since yesterday       Hydronephrosis due to obstruction of ureter- (present on admission)    Assessment & Plan    -mild  -continue IVF  -Urology OP FU     BENJAMIN (acute kidney injury) (HCC)- (present on admission)   Assessment & Plan    -likely due to pyelonephritis  -continue IVF hydration  -Urology has signed off  -urine culture pending  -continue IV ABX     Bladder cancer (HCC)- (present on admission)   Assessment & Plan    -s/p neoadjuvant chemotherapy followed by anterior pelvic exenteration, extended pelvic lymphadenectomy and ileal conduit urinary diversion on 3/7/19  -maintain OP FU            VTE prophylaxis: LINO Sage A.P.R.N.

## 2019-03-20 NOTE — ASSESSMENT & PLAN NOTE
-likely due to pyelonephritis  -continue IVF hydration  -Urology has signed off  -urine culture pending  -continue IV ABX

## 2019-03-21 LAB
ANION GAP SERPL CALC-SCNC: 7 MMOL/L (ref 0–11.9)
BACTERIA UR CULT: NORMAL
BASOPHILS # BLD AUTO: 0.5 % (ref 0–1.8)
BASOPHILS # BLD: 0.03 K/UL (ref 0–0.12)
BUN SERPL-MCNC: 15 MG/DL (ref 8–22)
CALCIUM SERPL-MCNC: 7.9 MG/DL (ref 8.5–10.5)
CHLORIDE SERPL-SCNC: 114 MMOL/L (ref 96–112)
CO2 SERPL-SCNC: 19 MMOL/L (ref 20–33)
CREAT SERPL-MCNC: 0.8 MG/DL (ref 0.5–1.4)
EOSINOPHIL # BLD AUTO: 0.18 K/UL (ref 0–0.51)
EOSINOPHIL NFR BLD: 2.8 % (ref 0–6.9)
ERYTHROCYTE [DISTWIDTH] IN BLOOD BY AUTOMATED COUNT: 45.1 FL (ref 35.9–50)
FOLATE SERPL-MCNC: 9.2 NG/ML
GLUCOSE SERPL-MCNC: 94 MG/DL (ref 65–99)
HCT VFR BLD AUTO: 25.7 % (ref 37–47)
HCT VFR BLD AUTO: 28.8 % (ref 37–47)
HGB BLD-MCNC: 8 G/DL (ref 12–16)
HGB BLD-MCNC: 9.1 G/DL (ref 12–16)
IMM GRANULOCYTES # BLD AUTO: 0.06 K/UL (ref 0–0.11)
IMM GRANULOCYTES NFR BLD AUTO: 0.9 % (ref 0–0.9)
LYMPHOCYTES # BLD AUTO: 1.41 K/UL (ref 1–4.8)
LYMPHOCYTES NFR BLD: 21.7 % (ref 22–41)
MCH RBC QN AUTO: 32 PG (ref 27–33)
MCHC RBC AUTO-ENTMCNC: 31.1 G/DL (ref 33.6–35)
MCV RBC AUTO: 102.8 FL (ref 81.4–97.8)
MONOCYTES # BLD AUTO: 0.73 K/UL (ref 0–0.85)
MONOCYTES NFR BLD AUTO: 11.2 % (ref 0–13.4)
NEUTROPHILS # BLD AUTO: 4.08 K/UL (ref 2–7.15)
NEUTROPHILS NFR BLD: 62.9 % (ref 44–72)
NRBC # BLD AUTO: 0 K/UL
NRBC BLD-RTO: 0 /100 WBC
PLATELET # BLD AUTO: 175 K/UL (ref 164–446)
PMV BLD AUTO: 9.4 FL (ref 9–12.9)
POTASSIUM SERPL-SCNC: 4.2 MMOL/L (ref 3.6–5.5)
RBC # BLD AUTO: 2.5 M/UL (ref 4.2–5.4)
SIGNIFICANT IND 70042: NORMAL
SITE SITE: NORMAL
SODIUM SERPL-SCNC: 140 MMOL/L (ref 135–145)
SOURCE SOURCE: NORMAL
VIT B12 SERPL-MCNC: 300 PG/ML (ref 211–911)
WBC # BLD AUTO: 6.5 K/UL (ref 4.8–10.8)

## 2019-03-21 PROCEDURE — A9270 NON-COVERED ITEM OR SERVICE: HCPCS | Performed by: INTERNAL MEDICINE

## 2019-03-21 PROCEDURE — 82746 ASSAY OF FOLIC ACID SERUM: CPT

## 2019-03-21 PROCEDURE — 85014 HEMATOCRIT: CPT

## 2019-03-21 PROCEDURE — 85018 HEMOGLOBIN: CPT

## 2019-03-21 PROCEDURE — 700111 HCHG RX REV CODE 636 W/ 250 OVERRIDE (IP): Performed by: INTERNAL MEDICINE

## 2019-03-21 PROCEDURE — 700105 HCHG RX REV CODE 258: Performed by: INTERNAL MEDICINE

## 2019-03-21 PROCEDURE — A9270 NON-COVERED ITEM OR SERVICE: HCPCS | Performed by: NURSE PRACTITIONER

## 2019-03-21 PROCEDURE — 700102 HCHG RX REV CODE 250 W/ 637 OVERRIDE(OP): Performed by: INTERNAL MEDICINE

## 2019-03-21 PROCEDURE — 85025 COMPLETE CBC W/AUTO DIFF WBC: CPT

## 2019-03-21 PROCEDURE — 700102 HCHG RX REV CODE 250 W/ 637 OVERRIDE(OP): Performed by: NURSE PRACTITIONER

## 2019-03-21 PROCEDURE — 700111 HCHG RX REV CODE 636 W/ 250 OVERRIDE (IP): Performed by: HOSPITALIST

## 2019-03-21 PROCEDURE — 80048 BASIC METABOLIC PNL TOTAL CA: CPT

## 2019-03-21 PROCEDURE — 99232 SBSQ HOSP IP/OBS MODERATE 35: CPT | Performed by: HOSPITALIST

## 2019-03-21 PROCEDURE — 82607 VITAMIN B-12: CPT

## 2019-03-21 PROCEDURE — 770006 HCHG ROOM/CARE - MED/SURG/GYN SEMI*

## 2019-03-21 RX ORDER — LACTOBACILLUS RHAMNOSUS GG 10B CELL
1 CAPSULE ORAL
Status: DISCONTINUED | OUTPATIENT
Start: 2019-03-22 | End: 2019-03-22 | Stop reason: HOSPADM

## 2019-03-21 RX ORDER — AMOXICILLIN 500 MG/1
500 CAPSULE ORAL EVERY 8 HOURS
Status: DISCONTINUED | OUTPATIENT
Start: 2019-03-21 | End: 2019-03-22 | Stop reason: HOSPADM

## 2019-03-21 RX ADMIN — SODIUM CHLORIDE: 9 INJECTION, SOLUTION INTRAVENOUS at 05:27

## 2019-03-21 RX ADMIN — ACETAMINOPHEN 650 MG: 325 TABLET, FILM COATED ORAL at 16:35

## 2019-03-21 RX ADMIN — SENNOSIDES AND DOCUSATE SODIUM 2 TABLET: 8.6; 5 TABLET ORAL at 16:36

## 2019-03-21 RX ADMIN — ATORVASTATIN CALCIUM 20 MG: 20 TABLET, FILM COATED ORAL at 21:09

## 2019-03-21 RX ADMIN — CARVEDILOL 12.5 MG: 6.25 TABLET, FILM COATED ORAL at 16:35

## 2019-03-21 RX ADMIN — SENNOSIDES AND DOCUSATE SODIUM 2 TABLET: 8.6; 5 TABLET ORAL at 06:00

## 2019-03-21 RX ADMIN — CEFTRIAXONE SODIUM 1 G: 1 INJECTION, POWDER, FOR SOLUTION INTRAMUSCULAR; INTRAVENOUS at 05:28

## 2019-03-21 RX ADMIN — OXYCODONE HYDROCHLORIDE 5 MG: 5 TABLET ORAL at 09:54

## 2019-03-21 RX ADMIN — ENOXAPARIN SODIUM 40 MG: 100 INJECTION SUBCUTANEOUS at 05:28

## 2019-03-21 RX ADMIN — CARVEDILOL 12.5 MG: 6.25 TABLET, FILM COATED ORAL at 09:52

## 2019-03-21 RX ADMIN — AMOXICILLIN 500 MG: 500 CAPSULE ORAL at 21:10

## 2019-03-21 ASSESSMENT — ENCOUNTER SYMPTOMS
HEADACHES: 0
VOMITING: 0
CHILLS: 0
COUGH: 0
FEVER: 0
WEAKNESS: 1
FALLS: 0
PALPITATIONS: 0
NERVOUS/ANXIOUS: 0
SHORTNESS OF BREATH: 0
BLURRED VISION: 0
FLANK PAIN: 1
DIZZINESS: 0
ABDOMINAL PAIN: 1
DIARRHEA: 0
DOUBLE VISION: 0
NAUSEA: 0
INSOMNIA: 0
MEMORY LOSS: 0

## 2019-03-21 NOTE — CARE PLAN
Problem: Safety  Goal: Will remain free from falls  Outcome: PROGRESSING AS EXPECTED  Fall precautions in place, call ight in reach, pt compliant.

## 2019-03-21 NOTE — PROGRESS NOTES
Report Received from Day RN, assumed care @1900  A/O x 4  VSS  Pain-denies  O2-RA  Diet-cardiac, denies n/v  Void-pos  BM- 3/19,pos flatus  Wound-RLQ urostomy Intact, MLI well healed YENI  Bed Locked in Lowest Position  Call light in reach

## 2019-03-21 NOTE — CARE PLAN
Problem: Infection  Goal: Will remain free from infection  Outcome: PROGRESSING AS EXPECTED  WBC improved, abx as ordered, hand hygiene, ICS and enc ambulation

## 2019-03-21 NOTE — PROGRESS NOTES
Pt self reports blood in the toilet when going to the bathroom this morning. Reports BM looked like coffee grounds. Blood when wiping- pt unsure if blood was coming from rectum or donal area. Pt reports smaller amounts of blood spotting from donal area at home after her uroostomy placement on 3/7

## 2019-03-21 NOTE — PROGRESS NOTES
[facility] Medicine Daily Progress Note    Date of Service  3/21/2019    Chief Complaint  74 y.o. female admitted 3/19/2019 with left-sided flank pain.     Hospital Course   This is a 74 year old female with PMH bladder cancer s/p neoadjuvant chemotherapy and anterior pelvic exenteration on 3/7/19 by Dr. Ritter. Morning of presentation she had a left ureteral catheter removed. About 3 hours later she developed severe left flank pain and fever.      Interval Problem Update  3/21 - Hgb drop 2 gm. She reports seeing 1/2 dollar sized amount blood in the toilet this morning but that her stool was brown. Check H/H this afternoon. No other indications of bleeding. Also checking Vitamin B12 and folate  3/20-pain significantly improved  -Urology has seen her and signed off. Treat urine and FU as OP.    Consultants/Specialty  Urology    Code Status  FULL    Disposition  DC home pending urine culture    Review of Systems  Review of Systems   Constitutional: Positive for malaise/fatigue. Negative for chills and fever.   HENT: Negative.    Eyes: Negative for blurred vision and double vision.   Respiratory: Negative for cough and shortness of breath.    Cardiovascular: Negative for chest pain and palpitations.   Gastrointestinal: Positive for abdominal pain (from all the pokes). Negative for diarrhea, nausea and vomiting.   Genitourinary: Positive for flank pain. Negative for hematuria.        Urostomy    Musculoskeletal: Negative for falls and joint pain.   Skin: Negative.    Neurological: Positive for weakness. Negative for dizziness and headaches.   Psychiatric/Behavioral: Negative for memory loss. The patient is not nervous/anxious and does not have insomnia.    All other systems reviewed and are negative.       Physical Exam  Temp:  [36.5 °C (97.7 °F)-37.1 °C (98.8 °F)] 36.5 °C (97.7 °F)  Pulse:  [69-94] 70  Resp:  [16-17] 17  BP: (117-143)/(53-64) 127/64  SpO2:  [94 %-99 %] 99 %    Physical Exam   Constitutional: She is  oriented to person, place, and time. Vital signs are normal. She appears well-developed and well-nourished. She is sleeping. She is easily aroused. She appears ill. No distress.   Pain 3/10. Sleeps when undisturbed   HENT:   Head: Normocephalic.   Eyes: Conjunctivae and EOM are normal. No scleral icterus.   Neck: Phonation normal.   Cardiovascular: Intact distal pulses.  Exam reveals distant heart sounds.    Murmur heard.  No edema   Pulmonary/Chest: Effort normal and breath sounds normal. She has no wheezes. She has no rhonchi.   Abdominal: Soft. Normal appearance and bowel sounds are normal. She exhibits no distension. There is no tenderness. There is no rigidity.   Ostomy with clear yellow urine to drainage bag   Genitourinary:   Genitourinary Comments: Ostomy with clear yellow urine   Musculoskeletal: Normal range of motion.   Neurological: She is alert, oriented to person, place, and time and easily aroused. She has normal strength.   Skin: Skin is warm and dry. No rash noted.   Psychiatric: Her behavior is normal. Judgment normal. Cognition and memory are normal.   Nursing note and vitals reviewed.      Fluids    Intake/Output Summary (Last 24 hours) at 03/21/19 1343  Last data filed at 03/21/19 0515   Gross per 24 hour   Intake             2350 ml   Output             1150 ml   Net             1200 ml       Laboratory  Recent Labs      03/19/19 2041 03/20/19   0614  03/21/19   0346   WBC  11.9*  8.6  6.5   RBC  3.20*  2.95*  2.50*   HEMOGLOBIN  10.6*  9.5*  8.0*   HEMATOCRIT  32.0*  30.2*  25.7*   MCV  100.0*  102.4*  102.8*   MCH  33.1*  32.2  32.0   MCHC  33.1*  31.5*  31.1*   RDW  43.3  45.1  45.1   PLATELETCT  225  216  175   MPV  9.1  9.5  9.4     Recent Labs      03/19/19   2041 03/20/19   0614  03/21/19   0346   SODIUM  137  140  140   POTASSIUM  4.4  3.7  4.2   CHLORIDE  106  109  114*   CO2  22  24  19*   GLUCOSE  119*  115*  94   BUN  15  14  15   CREATININE  1.10  1.01  0.80   CALCIUM  8.4*   "8.2*  7.9*                   Imaging  OUTSIDE IMAGES-CT ABDOMEN /PELVIS   Final Result           Assessment/Plan  Pyelonephritis- (present on admission)   Assessment & Plan    -Urology has evaluated, no new interventions, treat infection  -urine culture negative to date  -pain controlled  -IV ABX to PO today       Hydronephrosis due to obstruction of ureter- (present on admission)   Assessment & Plan    -mild  -continue IVF  -Urology OP FU     BENJAMIN (acute kidney injury) (HCC)- (present on admission)   Assessment & Plan    -likely due to pyelonephritis  -continue IVF hydration  -Urology has signed off  -urine culture pending  -continue IV ABX     Macrocytic anemia- (present on admission)   Assessment & Plan    -2 gm drop in Hgb  -patient reports \"some blood\" after going to the bathroom this morning. About a 1/2 dollar sized amount in the toilet and some when she wiped. She had BM which she said \"looked like coffee grounds, but not black\". This episode was apparently witnessed by the CNA but there is no documentation.  -I will check H/H this afternoon. Also check Vitamin B12 and folate.   -am CBC  -continue to monitor for bleeding     Bladder cancer (HCC)- (present on admission)   Assessment & Plan    -s/p neoadjuvant chemotherapy followed by anterior pelvic exenteration, extended pelvic lymphadenectomy and ileal conduit urinary diversion on 3/7/19  -maintain OP FU            VTE prophylaxis: Enoxaparin    Sola Sage A.P.R.N.        "

## 2019-03-21 NOTE — ASSESSMENT & PLAN NOTE
"-2 gm drop in Hgb  -patient reports \"some blood\" after going to the bathroom this morning. About a 1/2 dollar sized amount in the toilet and some when she wiped. She had BM which she said \"looked like coffee grounds, but not black\". This episode was apparently witnessed by the CNA but there is no documentation.  -I will check H/H this afternoon. Also check Vitamin B12 and folate.   -am CBC  -continue to monitor for bleeding  "

## 2019-03-22 ENCOUNTER — PATIENT OUTREACH (OUTPATIENT)
Dept: HEALTH INFORMATION MANAGEMENT | Facility: OTHER | Age: 74
End: 2019-03-22

## 2019-03-22 VITALS
BODY MASS INDEX: 28.2 KG/M2 | TEMPERATURE: 98.4 F | HEIGHT: 62 IN | RESPIRATION RATE: 17 BRPM | SYSTOLIC BLOOD PRESSURE: 118 MMHG | DIASTOLIC BLOOD PRESSURE: 64 MMHG | WEIGHT: 153.22 LBS | OXYGEN SATURATION: 94 % | HEART RATE: 76 BPM

## 2019-03-22 PROBLEM — D51.8 MACROCYTIC ANEMIA WITH VITAMIN B12 DEFICIENCY: Status: ACTIVE | Noted: 2019-03-20

## 2019-03-22 LAB
ERYTHROCYTE [DISTWIDTH] IN BLOOD BY AUTOMATED COUNT: 42.7 FL (ref 35.9–50)
HCT VFR BLD AUTO: 26.7 % (ref 37–47)
HGB BLD-MCNC: 8.7 G/DL (ref 12–16)
MCH RBC QN AUTO: 32.2 PG (ref 27–33)
MCHC RBC AUTO-ENTMCNC: 32.6 G/DL (ref 33.6–35)
MCV RBC AUTO: 98.9 FL (ref 81.4–97.8)
PLATELET # BLD AUTO: 177 K/UL (ref 164–446)
PMV BLD AUTO: 9 FL (ref 9–12.9)
RBC # BLD AUTO: 2.7 M/UL (ref 4.2–5.4)
WBC # BLD AUTO: 6.1 K/UL (ref 4.8–10.8)

## 2019-03-22 PROCEDURE — 85027 COMPLETE CBC AUTOMATED: CPT

## 2019-03-22 PROCEDURE — 36415 COLL VENOUS BLD VENIPUNCTURE: CPT

## 2019-03-22 PROCEDURE — 700102 HCHG RX REV CODE 250 W/ 637 OVERRIDE(OP): Performed by: INTERNAL MEDICINE

## 2019-03-22 PROCEDURE — 700102 HCHG RX REV CODE 250 W/ 637 OVERRIDE(OP): Performed by: NURSE PRACTITIONER

## 2019-03-22 PROCEDURE — A9270 NON-COVERED ITEM OR SERVICE: HCPCS | Performed by: INTERNAL MEDICINE

## 2019-03-22 PROCEDURE — A9270 NON-COVERED ITEM OR SERVICE: HCPCS | Performed by: NURSE PRACTITIONER

## 2019-03-22 PROCEDURE — 99239 HOSP IP/OBS DSCHRG MGMT >30: CPT | Performed by: HOSPITALIST

## 2019-03-22 RX ORDER — LACTOBACILLUS RHAMNOSUS GG 10B CELL
1 CAPSULE ORAL
Qty: 30 CAP | Refills: 2 | Status: SHIPPED | OUTPATIENT
Start: 2019-03-23 | End: 2022-10-13

## 2019-03-22 RX ORDER — ACETAMINOPHEN 325 MG/1
650 TABLET ORAL EVERY 6 HOURS PRN
Qty: 30 TAB | Refills: 0 | Status: SHIPPED | OUTPATIENT
Start: 2019-03-22

## 2019-03-22 RX ORDER — AMOXICILLIN 500 MG/1
500 CAPSULE ORAL EVERY 8 HOURS
Qty: 12 CAP | Refills: 0 | Status: SHIPPED | OUTPATIENT
Start: 2019-03-22 | End: 2019-03-26

## 2019-03-22 RX ORDER — AMOXICILLIN 500 MG/1
500 CAPSULE ORAL EVERY 8 HOURS
Qty: 30 CAP | Refills: 0 | Status: SHIPPED | OUTPATIENT
Start: 2019-03-22 | End: 2019-03-22

## 2019-03-22 RX ORDER — CHOLECALCIFEROL (VITAMIN D3) 125 MCG
1000 CAPSULE ORAL DAILY
Status: DISCONTINUED | OUTPATIENT
Start: 2019-03-22 | End: 2019-03-22 | Stop reason: HOSPADM

## 2019-03-22 RX ADMIN — CYANOCOBALAMIN TAB 500 MCG 1000 MCG: 500 TAB at 09:56

## 2019-03-22 RX ADMIN — CARVEDILOL 12.5 MG: 6.25 TABLET, FILM COATED ORAL at 05:46

## 2019-03-22 RX ADMIN — AMOXICILLIN 500 MG: 500 CAPSULE ORAL at 04:56

## 2019-03-22 RX ADMIN — Medication 1 CAPSULE: at 07:45

## 2019-03-22 RX ADMIN — ACETAMINOPHEN 650 MG: 325 TABLET, FILM COATED ORAL at 04:55

## 2019-03-22 NOTE — PROGRESS NOTES
Report Received from Day RN, assumed care @1900  Pt had 2 BMs at shift change, per CNA and Patient no blood or coffee ground appearance  A/O x 4  VSS  Pain-denies  O2-RA  Diet-Cardiac, denies N/V  Void-pos  BM-pos 3/21  Wound-RLQ urostomy, well healed MLI  Bed Locked in Lowest Position  Call light in reach

## 2019-03-22 NOTE — PROGRESS NOTES
Report received. POC discussed. Assumed care of pt.  Call light in reach. Hourly rounding in progress.  AxOx4. Calls appropriately. Low fall risk. Refusing assistance in bathroom, despite edu.   Pt gets up SBA Pt diet Reg, tolerating well LBM 3/22 loose brown large  Previous midline incision. Well approximated, CDI  Pt O2 sat 94 on RA   Pain 1/10, declines intervention  Urostomy to RLQ in place. Yellow, clear urine noted +flatus  All needs met at this time.

## 2019-03-22 NOTE — DISCHARGE INSTRUCTIONS
Discharge Instructions    Discharged to home by car with relative. Discharged via wheelchair, hospital escort: Yes.  Special equipment needed: Not Applicable    Be sure to schedule a follow-up appointment with your primary care doctor or any specialists as instructed.     Discharge Plan:   Diet Plan: Discussed  Activity Level: Discussed  Confirmed Follow up Appointment: Patient to Call and Schedule Appointment  Confirmed Symptoms Management: Discussed  Medication Reconciliation Updated: Yes  Influenza Vaccine Indication: Not indicated: Previously immunized this influenza season and > 8 years of age    I understand that a diet low in cholesterol, fat, and sodium is recommended for good health. Unless I have been given specific instructions below for another diet, I accept this instruction as my diet prescription.   Other diet: Reg    Special Instructions: None    · Is patient discharged on Warfarin / Coumadin?   No     Depression / Suicide Risk    As you are discharged from this [facility] facility, it is important to learn how to keep safe from harming yourself.    Recognize the warning signs:  · Abrupt changes in personality, positive or negative- including increase in energy   · Giving away possessions  · Change in eating patterns- significant weight changes-  positive or negative  · Change in sleeping patterns- unable to sleep or sleeping all the time   · Unwillingness or inability to communicate  · Depression  · Unusual sadness, discouragement and loneliness  · Talk of wanting to die  · Neglect of personal appearance   · Rebelliousness- reckless behavior  · Withdrawal from people/activities they love  · Confusion- inability to concentrate     If you or a loved one observes any of these behaviors or has concerns about self-harm, here's what you can do:  · Talk about it- your feelings and reasons for harming yourself  · Remove any means that you might use to hurt yourself (examples: pills, rope, extension  cords, firearm)  · Get professional help from the community (Mental Health, Substance Abuse, psychological counseling)  · Do not be alone:Call your Safe Contact- someone whom you trust who will be there for you.  · Call your local CRISIS HOTLINE 587-0095 or 434-503-3394  · Call your local Children's Mobile Crisis Response Team [place] (790) 234-5818 or www.Corhythm  · Call the toll free National Suicide Prevention Hotlines   · National Suicide Prevention Lifeline 808-107-DDXB (2886)  · [employer] Hope Line Network 800-SUICIDE (500-0270)    Urostomy Home Guide  A urostomy is an opening for urine to leave your body when you have had your bladder removed. During a surgery, the tubes that drain urine from your kidneys are connected to a piece of intestine. This piece of intestine is then brought through a hole in the abdominal wall. This new opening is called a stoma or ostomy. A bag or pouch fits over the stoma to catch your urine.  CARING FOR YOUR STOMA  Normally, the stoma looks a lot like the inside of your cheek: pink and moist. At first, it may be swollen, but this swelling will decrease within 6 weeks.  Keep the skin around the stoma clean and dry. You can gently wash your stoma and the skin around your stoma in the shower with a clean, soft washcloth. If you develop any skin irritation, your caregiver may give you a stoma powder or ointment to help heal the area. Do not use any products other than those specifically given to you by your caregiver.   Your stoma should not be uncomfortable. If you notice any stinging or burning, your pouch may be leaking, and the skin around your stoma may be coming into contact with urine. This can cause skin irritation. If you notice stinging or leaking of any amount, replace your pouch with a new one and discard the old one.  UROSTOMY POUCHES  The pouch that fits over the urostomy can be made up of either 1 or 2 pieces. A one-piece pouch has the skin barrier piece  and the pouch itself in one unit. A two-piece pouch has a skin barrier with a separate pouch that snaps on and off of the skin barrier. Either way, you should empty the urostomy when it is only  to ½ full. Do not let more urine build up. This could cause the pouch to leak.  If you are concerned with odor, ask your caregiver about ostomy deodorizer solutions you can use in the urostomy bag.  EMPTYING YOUR UROSTOMY POUCH  You may get lessons on how to empty your pouch from a wound-ostomy nurse before you leave the hospital. Here are the basic steps:  · Wash your hands with soap and water.  · Open the valve on the tail end of the pouch.  · Allow the bag to drain into the toilet.  · Close the valve and dry it.  · Wash your hands again.  AT NIGHT  You may need to set an alarm to get up a couple of times each night so that your pouch does not become too full. Otherwise, you may prefer to connect your pouch to a larger drainage system. If you use one of these larger systems, you will need to clean it carefully after each use:  · Wash your hands with soap and water.  · After emptying the drainage system in the morning, wash it with warm soapy water and rinse it carefully.  · Hang it up to dry for the day.  · Make a 1 to 1 water and vinegar mixture. Use this to clean the drainage system once a week.  · Keep a cap on the end of the tube when you are not using it. This is to keep the system clean.  · If the system  cracks or starts to leak, replace it with a new system.  · Wash your hands again.  CHANGING YOUR UROSTOMY POUCH  Change your urostomy pouch about every 3 to 4 days for the first 6 weeks, then every 5 to 7 days. Always change the bag sooner if you begin to notice any discomfort or irritation of the skin around the stoma. A wound-ostomy nurse may teach you how to change your pouch before you leave the hospital. Here are the basic steps:  · Lay out your supplies.  · Wash your hands with soap and water.  · Carefully  remove the old pouch.  · Wash the stoma and allow it to dry. Men may be advised to shave any hair around the stoma very carefully. This will make the adhesive stick better.  · Use the stoma measuring guide that comes with your pouch set to decide what size hole you will need to cut in the skin barrier piece. You want to choose the smallest possible size that will hold the stoma but will not touch it.  · Use the guide to trace the [place] on the back of the skin barrier piece. Cut out the hole.  · Hold the skin barrier piece over the stoma to make sure the hole is the correct size.  · Remove the adhesive paper backing from the skin barrier piece.  · Clean and dry the skin around the stoma again.  · Carefully fit the skin barrier piece over your stoma.  · If you are using a two-piece pouch, snap the pouch onto the skin barrier piece.  · Close the tail of the pouch.  · Put your hand over the top of the skin barrier piece to help warm it for about 5 minutes, so that it conforms to your body better.  · Wash your hands again.  DIET TIPS  · Drink about eight 8 oz glasses of water each day.  · You can continue to follow your usual diet, although some foods cause the urine to have a strong odor. You may want to cut back on some of these foods, such as:  ¨ Asparagus.  ¨ Fish.  ¨ Eggs.  ¨ Cheese.  ¨ Coffee.  ¨ Garlic.  ¨ Spicy foods.  · Medicines can also contribute to odor, including:  ¨ Vitamins.  ¨ Antibiotic medicine.  GENERAL TIPS  · It is normal to notice some mucus in your urine.  · You can shower with or without the bag in place.  · Always keep the bag on if you are bathing or swimming.  · If your bag gets wet, you can dry it with a blow-dryer set to cool.  · Avoid wearing tight clothing directly over your stoma so that it does not become irritated or bleed. Tight clothing can also prevent the urine from draining into the pouch, which can cause it to leak.  · It is helpful to always have an extra skin barrier and a  pouch with you when traveling. Do not leave them anywhere too warm, as parts of them can melt.  · Do not let your seat belt rest on your stoma. Try to keep the seat belt either above or below the stoma, or use a tiny pillow to cushion it.  · You can still participate in sports, but you should avoid activities in which there is a risk of getting hit in the abdomen.  · You can still have sex. It is a good idea to empty your pouch prior to sex. Some people and their partners feel very comfortable seeing the pouch during sex. Others choose to wear lingerie or a T-shirt that covers the device.  SEEK IMMEDIATE MEDICAL CARE IF:  · You notice a change in the size or color of the stoma, especially if it becomes very red, purple, black, or pale white.  · You have cloudy, bad smelling urine.  · You have increased amounts of mucus in the urine.  · You have bloody urine.  · You have back pain.  · You have abdominal pain, nausea, vomiting, or bloating.  · There is anything unusual protruding from the urostomy.  · You have irritated or red skin around the urostomy.     This information is not intended to replace advice given to you by your health care provider. Make sure you discuss any questions you have with your health care provider.     Document Released: 12/20/2004 Document Revised: 03/11/2013 Document Reviewed: 05/16/2012  [employer] Interactive Patient Education ©2016 [email or web address]

## 2019-03-22 NOTE — DISCHARGE SUMMARY
Discharge Summary    CHIEF COMPLAINT ON ADMISSION  Chief Complaint   Patient presents with   • Flank Pain     Left   • N/V       Reason for Admission  EMS     Admission Date  3/19/2019  Discharge Date  03/22/19      CODE STATUS  Full Code    HPI & HOSPITAL COURSE   This is a 74 year old female with PMH bladder cancer s/p neoadjuvant chemotherapy and anterior pelvic exenteration on 3/7/19 by Dr. Ritter. Morning of presentation she had a left ureteral catheter removed. About 3 hours later she developed severe left flank pain and fever.    Urine culture and blood culture negative.  Discussed with pharmacy.  We will discharge her with total antibiotic course of 7 days given her history.  She received education on increased risk of C. difficile colitis while on antibiotics.  She is instructed to consume yogurt daily while on antibiotics.  She had evidence of macrocytic anemia and her hemoglobin was noted to get as low as 8.0.  She was hemodynamically stable. She did have one episode of about a half dollar sized amount of blood when she was up to the bathroom.  No blood noted in stool. She has been on enoxaparin as outpatient as well as in hospital. This was discontinued. She reports she was told by urology that she might experience some bleeding from her urethra after stent removal.  She was monitored overnight and had no further episodes of bleeding and her hemoglobin at time of discharge was stable.  She was found to be vitamin B12 deficient and was started on daily replacement. She was instructed to resume her enoxaparin as previously prescribed prior to hospitalization and was educated on signs and symptoms of bleeding and importance of monitoring her urostomy output and bowel movements for blood.  She verbalizes understanding and is eager for discharge today.  She is instructed to follow-up with her PCP urology for further management and, in the interim she should return to the nearest emergency department/urgent care  or call 911 for worsening symptoms.    Therefore, she is discharged in fair and stable condition to home with close outpatient follow-up.    The patient met 2-midnight criteria for an inpatient stay at the time of discharge.    FOLLOW UP ITEMS POST DISCHARGE  -As discussed above    DISCHARGE DIAGNOSES  Active Problems:    Pyelonephritis POA: Yes    Hydronephrosis due to obstruction of ureter POA: Yes    Bladder cancer (HCC) POA: Yes    Macrocytic anemia with vitamin B12 deficiency POA: Yes    BENJAMIN (acute kidney injury) (HCC) POA: Yes  Resolved Problems:    * No resolved hospital problems. *      FOLLOW UP  Future Appointments  Date Time Provider Department Center   4/3/2019 3:00 PM Alesha Najera R.N. [place] [address]       MEDICATIONS ON DISCHARGE     Medication List      START taking these medications      Instructions   acetaminophen 325 MG Tabs  Commonly known as:  TYLENOL   Take 2 Tabs by mouth every 6 hours as needed (Mild Pain; (Pain scale 1-3); Temp greater than 100.5 F).  Dose:  650 mg     amoxicillin 500 MG Caps  Commonly known as:  AMOXIL   Take 1 Cap by mouth every 8 hours for 4 days.  Dose:  500 mg     cyanocobalamin 1000 MCG Tabs  Commonly known as:  VITAMIN B12   Take 1 Tab by mouth every day.  Dose:  1000 mcg     lactobacillus rhamnosus Caps capsule  Start taking on:  3/23/2019   Take 1 Cap by mouth every morning with breakfast.  Dose:  1 Cap        CONTINUE taking these medications      Instructions   atorvastatin 20 MG Tabs  Commonly known as:  LIPITOR   Take 20 mg by mouth every evening.  Dose:  20 mg     carvedilol 12.5 MG Tabs  Commonly known as:  COREG   Take 12.5 mg by mouth 2 times a day, with meals.  Dose:  12.5 mg     docusate sodium 100 MG Caps  Commonly known as:  COLACE   Take 1 Cap by mouth 2 times a day.  Dose:  100 mg     enoxaparin 40 MG/0.4ML Soln inj  Commonly known as:  LOVENOX   Inject 40 mg as instructed every day for 25 days.  Dose:  40 mg     tramadol 50 MG Tabs  Commonly known as:   ULTRAM   Take 25-50 mg by mouth every four hours as needed for Mild Pain.  Dose:  25-50 mg        STOP taking these medications    oxyCODONE immediate-release 5 MG Tabs  Commonly known as:  ROXICODONE            Allergies  No Known Allergies    DIET  Orders Placed This Encounter   Procedures   • Diet Order Cardiac     Standing Status:   Standing     Number of Occurrences:   1     Order Specific Question:   Diet:     Answer:   Cardiac [6]       ACTIVITY  As tolerated.  Weight bearing as tolerated    CONSULTATIONS  Urology    PROCEDURES  NA    LABORATORY  Lab Results   Component Value Date    SODIUM 140 03/21/2019    POTASSIUM 4.2 03/21/2019    CHLORIDE 114 (H) 03/21/2019    CO2 19 (L) 03/21/2019    GLUCOSE 94 03/21/2019    BUN 15 03/21/2019    CREATININE 0.80 03/21/2019        Lab Results   Component Value Date    WBC 6.1 03/22/2019    HEMOGLOBIN 8.7 (L) 03/22/2019    HEMATOCRIT 26.7 (L) 03/22/2019    PLATELETCT 177 03/22/2019        Total time of the discharge process exceeds 38 minutes.  MARLEY Owens.

## 2019-03-22 NOTE — PROGRESS NOTES
Discharge ordered. Went over discharge instructions. Pt educated on urostomy care Pt educated to follow up with PCP and Urology Pt alert and oriented. Patient has all belongings. Pt left with family to home.

## 2019-03-22 NOTE — CARE PLAN
Problem: Discharge Barriers/Planning  Goal: Patient's continuum of care needs will be met  Outcome: PROGRESSING AS EXPECTED  DC ordered. Edu provided. Family from [facility] to transport pt home.     Problem: Pain Management  Goal: Pain level will decrease to patient's comfort goal  Outcome: PROGRESSING AS EXPECTED  Pt educated on the 1-10 pain scale. Pt verbalizes understanding. Pt states that current drug regimen is effectively controlling pain. Scheduled tylenol for pain.

## 2019-03-25 LAB
BACTERIA BLD CULT: NORMAL
BACTERIA BLD CULT: NORMAL
SIGNIFICANT IND 70042: NORMAL
SIGNIFICANT IND 70042: NORMAL
SITE SITE: NORMAL
SITE SITE: NORMAL
SOURCE SOURCE: NORMAL
SOURCE SOURCE: NORMAL

## 2019-04-03 ENCOUNTER — APPOINTMENT (OUTPATIENT)
Dept: ADMISSIONS | Facility: MEDICAL CENTER | Age: 74
End: 2019-04-03
Attending: INTERNAL MEDICINE
Payer: MEDICARE

## 2019-04-03 ENCOUNTER — NON-PROVIDER VISIT (OUTPATIENT)
Dept: WOUND CARE | Facility: MEDICAL CENTER | Age: 74
End: 2019-04-03
Attending: UROLOGY
Payer: MEDICARE

## 2019-04-03 DIAGNOSIS — Z01.812 PRE-OPERATIVE LABORATORY EXAMINATION: ICD-10-CM

## 2019-04-03 LAB
INR PPP: 1.09 (ref 0.87–1.13)
PROTHROMBIN TIME: 14.2 SEC (ref 12–14.6)

## 2019-04-03 PROCEDURE — 99211 OFF/OP EST MAY X REQ PHY/QHP: CPT

## 2019-04-03 PROCEDURE — 36415 COLL VENOUS BLD VENIPUNCTURE: CPT

## 2019-04-03 PROCEDURE — 85610 PROTHROMBIN TIME: CPT

## 2019-04-03 NOTE — DOCUMENTATION QUERY
[facility]                                                                         Query Response Note      PATIENT:               GILLIAN GIPSON  ACCT #:                  [id number]  MRN:                       [id number]  :                       1945  ADMIT DATE:       3/7/2019 5:33 AM  DISCH DATE:        3/12/2019 3:26 PM  RESPONDING  PROVIDER #:        [id number]           RESPONSE TEXT:    Left pelvic whole chain diagnostic and therapeutic    QUERY TEXT:    Lymph Node Procedure Clarification [email or web address]    Removal of lymph nodes is documented in the operative report.  Please specify if removal was:      NOTE:  If an appropriate response is not listed below, please respond with a new note        The patient's Clinical Indicators include:  Clinical indicators - muscle invasive bladder cancer  Treatment- Ileal conduit; right and left pelvic lymphadenectomy  Query created by: Teri Ley on 3/13/2019 11:22 AM       RESPONSE TEXT:    Right pelvic whole chain diagnostic and therapeutic    QUERY TEXT:    Lymph Node Procedure Clarification [email or web address]    Removal of lymph nodes is documented in the operative report.  Please specify if removal was:      NOTE:  If an appropriate response is not listed below, please respond with a new note    The patient's Clinical Indicators include:  Clinical indicators- muscle invasive bladder cancer  Treatment - ileal conduit; bilateral pelvic lymphadenectomy  Query created by: Teri Ley on 3/13/2019 11:25 AM        Electronically signed by:  AOY HAWTHORNE MD 4/3/2019 2:01 PM

## 2019-04-03 NOTE — CERTIFICATION
"[facility]  [facility]  [address]  [place] [address]  [phone] Fax#    Initial Ostomy Evaluation  For 90 Day Certification Period:  4/3/19-7/3/19    Referring Provider:  Jaquan Ritter M.D  Primary Provider: Anthony Oh M.D  Consulting Providers:  Surgeon: Jaquan Ritter M.D      Start of Care: 4/3/19  Reason for referral: Z43.6 (ICD-10-CM) - Attention to urostomy      SUBJECTIVE: \"He (patient ) changes it every three days.\"    HPI: 74 year old female presents for follow-up with urostomy care post bladder cancer surgery with ileal conduit urinary diversion creation.    From 3/19/19 Dr. Patel's HPI: 74 y.o. female with a past medical history of bladder cancer status post neoadjuvant chemotherapy and anterior pelvic exenteration on 3/7/19 by Dr. Ritter who presented 3/19/2019 with left flank pain.  Apparently the patient had a left ureteral catheter removed today by her urologist at around 11 AM.  3 hours later she developed nausea with left flank pain and a fever of 101.4 for which she presented to the ER.  Patient was diagnosed with muscle invasive bladder cancer for which she underwent neoadjuvant chemotherapy followed by anterior pelvic exenteration, extended pelvic lymphadenectomy and ileal conduit urinary diversion on 3/7/19.  Patient reported severe flank pain for which she was given fentanyl en route to the hospital due to which she is currently somnolent but arousable.  She denies any chest pain, headache, shortness of breath or diarrhea.  The ER physician consulted Dr Ritter who recommended IV antibiotics and IV fluids, he will see her in the morning.    Past Medical Hx:   Past Medical History:   Diagnosis Date   • Arthritis    • Blood clotting disorder (HCC) 08/2018    when she had a bladder tumor, passing large clots   • Cancer (HCC) 08/2018    Bladder CA/tumor   • Cancer (HCC) 1980    R Breast/lumpectomy   • Dental disorder     " dentures-upper   • High cholesterol    • Hypertension    • Renal disorder     kidney stone   • Urinary bladder disorder     cancer   • Urinary incontinence        Surgical Hx:   Past Surgical History:   Procedure Laterality Date   • PELVIC EXENTERATION  3/7/2019    Procedure: PELVIC EXENTERATION-  ANTERIOR;  Surgeon: Jaquan Ritter M.D.;  Location: SURGERY [facility];  Service: Urology   • LYMPHADENECTOMY Bilateral 3/7/2019    Procedure: LYMPHADENECTOMY- FOR: PELVIC LYMPH NODE DISSECTION;  Surgeon: Jaquan Ritter M.D.;  Location: SURGERY [facility];  Service: Urology   • URETHRECTOMY  3/7/2019    Procedure: URETHRECTOMY- EN BLOC;  Surgeon: Jaquan Ritter M.D.;  Location: SURGERY [facility];  Service: Urology   • ILEO LOOP DIVERSION  3/7/2019    Procedure: ILEO LOOP DIVERSION-  FOR: ILEAL CONDUIT URINARY DIVERSION;  Surgeon: Jaquan Ritter M.D.;  Location: SURGERY [facility];  Service: Urology   • OTHER  08/2018    Tumor removed from Bladder   • OTHER      breast biopsy   • TONSILLECTOMY         Medications:   Current Outpatient Prescriptions on File Prior to Visit   Medication Sig Dispense Refill   • acetaminophen (TYLENOL) 325 MG Tab Take 2 Tabs by mouth every 6 hours as needed (Mild Pain; (Pain scale 1-3); Temp greater than 100.5 F). 30 Tab 0   • lactobacillus rhamnosus (CULTURELLE) Cap capsule Take 1 Cap by mouth every morning with breakfast. 30 Cap 2   • cyanocobalamin (VITAMIN B12) 1000 MCG Tab Take 1 Tab by mouth every day. 30 Tab 3   • tramadol (ULTRAM) 50 MG Tab Take 25-50 mg by mouth every four hours as needed for Mild Pain.     • docusate sodium (COLACE) 100 MG Cap Take 1 Cap by mouth 2 times a day. 60 Cap 0   • enoxaparin (LOVENOX) 40 MG/0.4ML Solution inj Inject 40 mg as instructed every day for 25 days. 25 Syringe 0   • atorvastatin (LIPITOR) 20 MG Tab Take 20 mg by mouth every evening.     • carvedilol (COREG) 12.5 MG Tab Take 12.5 mg by mouth 2 times a day, with meals.       No current  "facility-administered medications on file prior to visit.        Allergies: Patient has no known allergies.    Social Hx:   Social History     Social History   • Marital status:      Spouse name: N/A   • Number of children: N/A   • Years of education: N/A     Occupational History   • Not on file.     Social History Main Topics   • Smoking status: Former Smoker     Packs/day: 1.00     Years: 15.00     Types: Cigarettes     Quit date: 1/1/1983   • Smokeless tobacco: Never Used   • Alcohol use No   • Drug use: No   • Sexual activity: Not on file     Other Topics Concern   • Not on file     Social History Narrative   • No narrative on file       OBJECTIVE:     STOMA ASSESSMENT:   Type:  ____Ileostomy     ____Colostomy    __x__Urostomy    ____Other     Location: [facility]       Size: 1\"   Shape: slightly oval   Color: red moist   Protrudes:      ___ >1 inch               __x_ <1 inch   [place]erson: pointing 6:00 into skin    Mucocutaneous Junction:  Intact with sutures   Skin indentations, creases or scar tissue: midline incisional scar, crease superior when seated   Jennifer-stomal Skin Problems: circumferential red irritation (unclear if from allergy to ceraplus kar or if wafer has been too saturated with moist urine)    Effluent / Flatus: ample light yellow   Photo  __x__ Yes ____ No    Pouching Procedure:   Old appliance removed.    Peristomal skin cleansed with: washcloth & water   Peristomal skin treated with: crusted with stoma powder & no sting skin prep   Ostomy appliance used: 30mm convex paste ring, CTF 2 piece kar 2 1/4\" (27465) with matching urostomy pouch     Laying down      Sitting position    Patient Education:   Verbal/demonstration instructions of above pouching procedure provided to patient/family.      Response:  can complete change without assistance & patient verbalize understanding; handout given regarding [employer] meetings, stealth belt, discussed underwear options & pouch positioning, " "reviewed plan of care with rationale      PLAN:    Supplies Needed:   Appliance type:    patient has already received order from [employer] for Milagros CTF 2 piece 2 1/4\" ([id number]) & flat paste rings; so trial of using flat wafer with convex ring prior to switching to convex appliance or another brand             Other: stoma powder, no sting skin prep, paste ring     Accessories:         Supplier: [employer]    Frequency:  2x/week, then possible 1x/week when peristomal irritation resolving      Professional Collaboration:  Evaluation notes forwarded to referring provider.      At the time of each visit, a thorough assessment of the patient is completed to assure appropriateness of our plan of care.  The plan of care may need to be adapted from the original plan of care to address any significant changes in patient status.    Clinician Signature:  _________________________________  Date:  ____________    Physician Signature:  ________________________________  Date:  ____________       "

## 2019-04-03 NOTE — PATIENT INSTRUCTIONS
Change urostomies and ileostomies every 3-5 days. Change colostomies every 5-7 days. Change appliance immediately if it is leaking or peristomal skin feels irritated, has itching, or  burning.   To change the appliance, remove previous appliance, cleanse peristomal skin with warm water/washcloth, pat dry, make an ostomy template or use cardboard measuring guide and trace ostomy shape onto back of barrier, cut out barrier, apply a paste ring around barrier opening and apply appliance. Empty pouches when no more than ½ full. Check contents every 2 hours or as needed. Do not leave soap residue on tissue and do not use baby wipes or skin prep wipes.     Should you experience any significant changes in your condition, such as infection (redness, swelling, localized heat, increased pain, fever > 101 F, chills) or have any questions regarding your home care instructions, please contact the wound center at [phone]. If after hours, contact your primary care physician or go to the hospital emergency room.

## 2019-04-09 ENCOUNTER — NON-PROVIDER VISIT (OUTPATIENT)
Dept: WOUND CARE | Facility: MEDICAL CENTER | Age: 74
End: 2019-04-09
Attending: UROLOGY
Payer: MEDICARE

## 2019-04-09 ENCOUNTER — HOSPITAL ENCOUNTER (OUTPATIENT)
Facility: MEDICAL CENTER | Age: 74
End: 2019-04-09
Attending: INTERNAL MEDICINE | Admitting: INTERNAL MEDICINE
Payer: MEDICARE

## 2019-04-09 ENCOUNTER — APPOINTMENT (OUTPATIENT)
Dept: RADIOLOGY | Facility: MEDICAL CENTER | Age: 74
End: 2019-04-09
Attending: INTERNAL MEDICINE
Payer: MEDICARE

## 2019-04-09 VITALS
DIASTOLIC BLOOD PRESSURE: 65 MMHG | RESPIRATION RATE: 17 BRPM | OXYGEN SATURATION: 100 % | SYSTOLIC BLOOD PRESSURE: 144 MMHG | HEIGHT: 61 IN | WEIGHT: 147.27 LBS | HEART RATE: 74 BPM | BODY MASS INDEX: 27.8 KG/M2

## 2019-04-09 DIAGNOSIS — C67.4 MALIGNANT NEOPLASM OF POSTERIOR WALL OF URINARY BLADDER (HCC): ICD-10-CM

## 2019-04-09 PROCEDURE — 99211 OFF/OP EST MAY X REQ PHY/QHP: CPT

## 2019-04-09 PROCEDURE — 700111 HCHG RX REV CODE 636 W/ 250 OVERRIDE (IP)

## 2019-04-09 PROCEDURE — 99153 MOD SED SAME PHYS/QHP EA: CPT

## 2019-04-09 PROCEDURE — 700101 HCHG RX REV CODE 250

## 2019-04-09 PROCEDURE — 700111 HCHG RX REV CODE 636 W/ 250 OVERRIDE (IP): Performed by: RADIOLOGY

## 2019-04-09 RX ORDER — LIDOCAINE HYDROCHLORIDE AND EPINEPHRINE BITARTRATE 20; .01 MG/ML; MG/ML
INJECTION, SOLUTION SUBCUTANEOUS
Status: DISCONTINUED
Start: 2019-04-09 | End: 2019-04-09 | Stop reason: HOSPADM

## 2019-04-09 RX ORDER — ONDANSETRON 2 MG/ML
4 INJECTION INTRAMUSCULAR; INTRAVENOUS PRN
Status: ACTIVE | OUTPATIENT
Start: 2019-04-09 | End: 2019-04-09

## 2019-04-09 RX ORDER — MORPHINE SULFATE 4 MG/ML
4 INJECTION, SOLUTION INTRAMUSCULAR; INTRAVENOUS
Status: CANCELLED | OUTPATIENT
Start: 2019-04-09 | End: 2019-04-10

## 2019-04-09 RX ORDER — MIDAZOLAM HYDROCHLORIDE 1 MG/ML
INJECTION INTRAMUSCULAR; INTRAVENOUS
Status: COMPLETED
Start: 2019-04-09 | End: 2019-04-09

## 2019-04-09 RX ORDER — MIDAZOLAM HYDROCHLORIDE 1 MG/ML
.5-2 INJECTION INTRAMUSCULAR; INTRAVENOUS PRN
Status: ACTIVE | OUTPATIENT
Start: 2019-04-09 | End: 2019-04-09

## 2019-04-09 RX ORDER — SODIUM CHLORIDE 9 MG/ML
500 INJECTION, SOLUTION INTRAVENOUS
Status: ACTIVE | OUTPATIENT
Start: 2019-04-09 | End: 2019-04-09

## 2019-04-09 RX ORDER — OXYCODONE HYDROCHLORIDE 5 MG/1
5 TABLET ORAL
Status: CANCELLED | OUTPATIENT
Start: 2019-04-09 | End: 2019-04-10

## 2019-04-09 RX ORDER — SODIUM CHLORIDE 9 MG/ML
INJECTION, SOLUTION INTRAVENOUS
Status: DISCONTINUED | OUTPATIENT
Start: 2019-04-09 | End: 2019-04-09 | Stop reason: HOSPADM

## 2019-04-09 RX ORDER — OXYCODONE HYDROCHLORIDE 10 MG/1
10 TABLET ORAL
Status: CANCELLED | OUTPATIENT
Start: 2019-04-09 | End: 2019-04-10

## 2019-04-09 RX ADMIN — MIDAZOLAM 1 MG: 1 INJECTION INTRAMUSCULAR; INTRAVENOUS at 10:12

## 2019-04-09 RX ADMIN — FENTANYL CITRATE 25 MCG: 50 INJECTION, SOLUTION INTRAMUSCULAR; INTRAVENOUS at 10:12

## 2019-04-09 RX ADMIN — MIDAZOLAM 2 MG: 1 INJECTION INTRAMUSCULAR; INTRAVENOUS at 10:16

## 2019-04-09 NOTE — PROGRESS NOTES
Chief Complaint   Patient presents with    Follow-up     Patient has had labs drawn and was told to come into to get lab results. Patient had an ulcer on right leg at last visit and has home health nurse that is taking care of the wound and is healing. 1. Have you been to the ER, urgent care clinic since your last visit? Hospitalized since your last visit? No    2. Have you seen or consulted any other health care providers outside of the [address] [place] [address] since your last visit? Include any pap smears or colon screening.  No     Health Maintenance Due   Topic Date Due    DTaP/Tdap/Td series (1 - Tdap) 01/17/1973    BREAST CANCER SCRN MAMMOGRAM  04/25/2016    GLAUCOMA SCREENING Q2Y  01/17/2017    Bone Densitometry (Dexa) Screening  01/17/2017    Pneumococcal 65+ Low/Medium Risk (2 of 2 - PPSV23) 02/22/2018    MEDICARE YEARLY EXAM  03/14/2018     ] History and Physical    Date: 4/9/2019    PCP: Anthony Oh M.D.      CC: 75 y/o female with hx of bladder cancer s/p resection and chemotherapy.     HPI: This is a 74 y.o. female who is presenting for port removal.     Past Medical History:   Diagnosis Date   • Arthritis    • Blood clotting disorder (HCC) 08/2018    when she had a bladder tumor, passing large clots   • Cancer (HCC) 08/2018    Bladder CA/tumor   • Cancer (HCC) 1980    R Breast/lumpectomy   • Dental disorder     dentures-upper   • High cholesterol    • Hypertension    • Renal disorder     kidney stone   • Urinary bladder disorder     cancer   • Urinary incontinence        Past Surgical History:   Procedure Laterality Date   • PELVIC EXENTERATION  3/7/2019    Procedure: PELVIC EXENTERATION-  ANTERIOR;  Surgeon: Jaquan Ritter M.D.;  Location: [facility];  Service: Urology   • LYMPHADENECTOMY Bilateral 3/7/2019    Procedure: LYMPHADENECTOMY- FOR: PELVIC LYMPH NODE DISSECTION;  Surgeon: Jaquan Ritter M.D.;  Location: [facility];  Service: Urology   • URETHRECTOMY  3/7/2019    Procedure: URETHRECTOMY- EN BLOC;  Surgeon: Jaquan Ritter M.D.;  Location: [facility];  Service: Urology   • ILEO LOOP DIVERSION  3/7/2019    Procedure: ILEO LOOP DIVERSION-  FOR: ILEAL CONDUIT URINARY DIVERSION;  Surgeon: Jaquan Ritter M.D.;  Location: [facility];  Service: Urology   • OTHER  08/2018    Tumor removed from Bladder   • OTHER      breast biopsy   • TONSILLECTOMY         Current Facility-Administered Medications   Medication Dose Route Frequency Provider Last Rate Last Dose   • FENTANYL CITRATE (PF) 0.05 MG/ML INJ SOLN            • MIDAZOLAM HCL 2 MG/2ML INJ SOLN            • LIDOCAINE-EPINEPHRINE 2 %-1:[id number] INJ SOLN            • NS infusion   Intravenous PPU Continuous [facility]            Social History     Social History   • Marital status:      Spouse name: N/A   • Number of children: N/A   •  Years of education: N/A     Occupational History   • Not on file.     Social History Main Topics   • Smoking status: Former Smoker     Packs/day: 1.00     Years: 15.00     Types: Cigarettes     Quit date: 1/1/1983   • Smokeless tobacco: Never Used   • Alcohol use No   • Drug use: No   • Sexual activity: Not on file     Other Topics Concern   • Not on file     Social History Narrative   • No narrative on file       History reviewed. No pertinent family history.    Allergies:  Patient has no known allergies.    Review of Systems:  Negative     Physical Exam    Vital Signs  Blood Pressure : (!) 166/75       Pulse: 71   Respiration: 18   Pulse Oximetry: 99 %        Labs:                    Radiology:  IR-CVC TUNNEL WITH PORT REMOVAL    (Results Pending)             Assessment and Plan:  This is a 74 y.o. female who is presenting for port removal.

## 2019-04-09 NOTE — OR SURGEON
Immediate Post- Operative Note        PostOp Diagnosis: Patient has completed chemotherapy.       Procedure(s): Port removal.       Estimated Blood Loss: Less than 5 ml        Complications: None            4/9/2019     1021 AM     Jeremy Doss

## 2019-04-09 NOTE — WOUND TEAM
"Advanced Wound Care  [facility]  [address]  [place] [address]  [phone] Fax#    Ostomy Encounter  For 90 Day Certification Period:  4/3/19-7/3/19    Referring Provider:  Jaquan Ritter M.D  Primary Provider: Anthony Oh M.D  Consulting Providers:  Surgeon: Jaquan Ritter M.D      Start of Care: 4/3/19  Reason for referral: Z43.6 (ICD-10-CM) - Attention to urostomy      SUBJECTIVE: \"The appliance lasted three days.\"    HPI: 74 year old female presents for follow-up with urostomy care post bladder cancer surgery with ileal conduit urinary diversion creation.    From 3/19/19 Dr. Patel's HPI: 74 y.o. female with a past medical history of bladder cancer status post neoadjuvant chemotherapy and anterior pelvic exenteration on 3/7/19 by Dr. Ritter who presented 3/19/2019 with left flank pain.  Apparently the patient had a left ureteral catheter removed today by her urologist at around 11 AM.  3 hours later she developed nausea with left flank pain and a fever of 101.4 for which she presented to the ER.  Patient was diagnosed with muscle invasive bladder cancer for which she underwent neoadjuvant chemotherapy followed by anterior pelvic exenteration, extended pelvic lymphadenectomy and ileal conduit urinary diversion on 3/7/19.  Patient reported severe flank pain for which she was given fentanyl en route to the hospital due to which she is currently somnolent but arousable.  She denies any chest pain, headache, shortness of breath or diarrhea.  The ER physician consulted Dr Ritter who recommended IV antibiotics and IV fluids, he will see her in the morning.    Past Medical Hx:   Past Medical History:   Diagnosis Date   • Arthritis    • Blood clotting disorder (HCC) 08/2018    when she had a bladder tumor, passing large clots   • Cancer (HCC) 08/2018    Bladder CA/tumor   • Cancer (HCC) 1980 R Breast/lumpectomy   • Dental disorder     dentures-upper   • High cholesterol "    • Hypertension    • Renal disorder     kidney stone   • Urinary bladder disorder     cancer   • Urinary incontinence        Surgical Hx:   Past Surgical History:   Procedure Laterality Date   • PELVIC EXENTERATION  3/7/2019    Procedure: PELVIC EXENTERATION-  ANTERIOR;  Surgeon: Jaquan Ritter M.D.;  Location: SURGERY [facility];  Service: Urology   • LYMPHADENECTOMY Bilateral 3/7/2019    Procedure: LYMPHADENECTOMY- FOR: PELVIC LYMPH NODE DISSECTION;  Surgeon: Jaquan Ritter M.D.;  Location: SURGERY [facility];  Service: Urology   • URETHRECTOMY  3/7/2019    Procedure: URETHRECTOMY- EN BLOC;  Surgeon: Jaquan Ritter M.D.;  Location: SURGERY [facility];  Service: Urology   • ILEO LOOP DIVERSION  3/7/2019    Procedure: ILEO LOOP DIVERSION-  FOR: ILEAL CONDUIT URINARY DIVERSION;  Surgeon: Jaquan Ritter M.D.;  Location: SURGERY [facility];  Service: Urology   • OTHER  08/2018    Tumor removed from Bladder   • OTHER      breast biopsy   • TONSILLECTOMY         Medications:   Current Outpatient Prescriptions on File Prior to Visit   Medication Sig Dispense Refill   • acetaminophen (TYLENOL) 325 MG Tab Take 2 Tabs by mouth every 6 hours as needed (Mild Pain; (Pain scale 1-3); Temp greater than 100.5 F). 30 Tab 0   • lactobacillus rhamnosus (CULTURELLE) Cap capsule Take 1 Cap by mouth every morning with breakfast. 30 Cap 2   • cyanocobalamin (VITAMIN B12) 1000 MCG Tab Take 1 Tab by mouth every day. 30 Tab 3   • tramadol (ULTRAM) 50 MG Tab Take 25-50 mg by mouth every four hours as needed for Mild Pain.     • docusate sodium (COLACE) 100 MG Cap Take 1 Cap by mouth 2 times a day. 60 Cap 0   • atorvastatin (LIPITOR) 20 MG Tab Take 20 mg by mouth every evening.     • carvedilol (COREG) 12.5 MG Tab Take 12.5 mg by mouth 2 times a day, with meals.       No current facility-administered medications on file prior to visit.        Allergies: Patient has no known allergies.    Social Hx:   Social History  "    Social History   • Marital status:      Spouse name: N/A   • Number of children: N/A   • Years of education: N/A     Occupational History   • Not on file.     Social History Main Topics   • Smoking status: Former Smoker     Packs/day: 1.00     Years: 15.00     Types: Cigarettes     Quit date: 1/1/1983   • Smokeless tobacco: Never Used   • Alcohol use No   • Drug use: No   • Sexual activity: Not on file     Other Topics Concern   • Not on file     Social History Narrative   • No narrative on file       OBJECTIVE:     STOMA ASSESSMENT:   Type:  ____Ileostomy     ____Colostomy    __x__Urostomy    ____Other     Location: RLQ       Size: 1\"   Shape: slightly oval   Color: red moist   Protrudes:      ___ >1 inch               __x_ <1 inch   [place]: pointing 6:00 into skin    Mucocutaneous Junction:  Intact with sutures   Skin indentations, creases or scar tissue: midline incisional scar, crease superior when seated   Donal-stomal Skin Problems: circumferential red irritation (unclear if from allergy to ceraplus milagros or if wafer has been too saturated with moist urine). This has improved a little per pt/spouse   Effluent / Flatus: ample light yellow   Photo  __x__ Yes ____ No    Pouching Procedure:   Old appliance removed.    Peristomal skin cleansed with: washcloth & water   Peristomal skin treated with: luxiq foam to areas of dermatitis donal stoma, allowed to dry.    Ostomy appliance used: [place] medical adhesive spray, then 30mm convex paste ring, CTF 2 piece milagros 2 1/4\" ([id number]) with matching urostomy pouch. Added a stoma belt today.            Patient Education:   Verbal/demonstration instructions of above pouching procedure provided to patient/family.      Response: spouse is mainly changing appliance. He has a good understanding    PLAN:    Supplies Needed:   Appliance type:    patient has already received order from [employer] for [place] CTF 2 piece 2 1/4\" (88855) & flat paste rings; so trial " of using flat wafer with convex ring prior to switching to convex appliance or another brand             Other: stoma powder, no sting skin prep, paste ring     Accessories:    Stoma belt     Supplier: [employer]    Frequency:  2x/week, then possible 1x/week when peristomal irritation resolving      Professional Collaboration:  Evaluation notes forwarded to referring provider.      At the time of each visit, a thorough assessment of the patient is completed to assure appropriateness of our plan of care.  The plan of care may need to be adapted from the original plan of care to address any significant changes in patient status.    Clinician Signature:  _________________________________  Date:  ____________    Physician Signature:  ________________________________  Date:  ____________

## 2019-04-09 NOTE — PATIENT INSTRUCTIONS
Change urostomies every 3-5 days. Change appliance immediately if it is leaking or peristomal skin feels irritated, has itching, or  burning.   To change the appliance, remove previous appliance, cleanse peristomal skin with warm water/washcloth, pat dry, make an ostomy template or use cardboard measuring guide and trace ostomy shape onto back of barrier, cut out barrier, apply a paste ring around barrier opening and apply appliance. Empty pouches when no more than ½ full. Check contents every 2 hours or as needed. Do not leave soap residue on tissue and do not use baby wipes or skin prep wipes.     Should you experience any significant changes in your condition, such as infection (redness, swelling, localized heat, increased pain, fever > 101 F, chills) or have any questions regarding your home care instructions, please contact the wound center at [phone]. If after hours, contact your primary care physician or go to the hospital emergency room.

## 2019-04-09 NOTE — PROGRESS NOTES
IR Procedure RN's Note:    Patient consented by Dr. Doss prior to start of procedure; pt and MD signed consent; MD placed an updated H&P in EPIC.    Implanted port removal done by Dr. Doss; RIGHT existing port upper chest access site; pt assessed upon arrival, pt A/Ox4, pt aware of the procedure, pt baseline - anxious, baseline SBP in 1502-170s, pt report NOT taking her antihypertensives; port and catheter intact; pt appears comfortable throughout the procedure with no signs of distress or discomfort; ETCO2 monitored with a baseline of 32mmHg and ranged between 17-38mmHg throughout the procedure; sutured access by Hakan MELENDEZ; access site CDI, soft with no signs of hematoma or bleeding, gauze and tegaderm for dressing; recovered by this RN in the recovery phase; pt is lethargic and on 3L NC O2 post procedure and in recovery start phase..

## 2019-04-09 NOTE — DISCHARGE INSTRUCTIONS
ACTIVITY: Rest and take it easy for the first 24 hours.  A responsible adult is recommended to remain with you during that time.  It is normal to feel sleepy.  We encourage you to not do anything that requires balance, judgment or coordination.    MILD FLU-LIKE SYMPTOMS ARE NORMAL. YOU MAY EXPERIENCE GENERALIZED MUSCLE ACHES, THROAT IRRITATION, HEADACHE AND/OR SOME NAUSEA.    FOR 24 HOURS DO NOT:  Drive, operate machinery or run household appliances.  Drink beer or alcoholic beverages.   Make important decisions or sign legal documents.    SPECIAL INSTRUCTIONS: No soaking in a hot tub or bath for 1 week.    DIET: To avoid nausea, slowly advance diet as tolerated, avoiding spicy or greasy foods for the first day.  Add more substantial food to your diet according to your physician's instructions.  Babies can be fed formula or breast milk as soon as they are hungry.  INCREASE FLUIDS AND FIBER TO AVOID CONSTIPATION.    SURGICAL DRESSING/BATHING: May shower in 48 hours, hot tubs for 1 week    FOLLOW-UP APPOINTMENT:  A follow-up appointment should be arranged with your doctor in 1 week; call to schedule.    You should CALL YOUR PHYSICIAN if you develop:  Fever greater than 101 degrees F.  Pain not relieved by medication, or persistent nausea or vomiting.  Excessive bleeding (blood soaking through dressing) or unexpected drainage from the wound.  Extreme redness or swelling around the incision site, drainage of pus or foul smelling drainage.  Inability to urinate or empty your bladder within 8 hours.  Problems with breathing or chest pain.    You should call 911 if you develop problems with breathing or chest pain.  If you are unable to contact your doctor or surgical center, you should go to the nearest emergency room or urgent care center.  Physician's telephone #: [phone]    If any questions arise, call your doctor.  If your doctor is not available, please feel free to call the Surgical Center at [phone].  The  Center is open Monday through Friday from 7AM to 7PM.  You can also call the HEALTH HOTLINE open 24 hours/day, 7 days/week and speak to a nurse at [phone], or toll free at [phone].    A registered nurse may call you a few days after your surgery to see how you are doing after your procedure.    MEDICATIONS: Resume taking daily medication.  Take prescribed pain medication with food.  If no medication is prescribed, you may take non-aspirin pain medication if needed.  PAIN MEDICATION CAN BE VERY CONSTIPATING.  Take a stool softener or laxative such as senokot, pericolace, or milk of magnesia if needed.      If your physician has prescribed pain medication that includes Acetaminophen (Tylenol), do not take additional Acetaminophen (Tylenol) while taking the prescribed medication.    Depression / Suicide Risk    As you are discharged from this [facility] facility, it is important to learn how to keep safe from harming yourself.    Recognize the warning signs:  · Abrupt changes in personality, positive or negative- including increase in energy   · Giving away possessions  · Change in eating patterns- significant weight changes-  positive or negative  · Change in sleeping patterns- unable to sleep or sleeping all the time   · Unwillingness or inability to communicate  · Depression  · Unusual sadness, discouragement and loneliness  · Talk of wanting to die  · Neglect of personal appearance   · Rebelliousness- reckless behavior  · Withdrawal from people/activities they love  · Confusion- inability to concentrate     If you or a loved one observes any of these behaviors or has concerns about self-harm, here's what you can do:  · Talk about it- your feelings and reasons for harming yourself  · Remove any means that you might use to hurt yourself (examples: pills, rope, extension cords, firearm)  · Get professional help from the community (Mental Health, Substance Abuse, psychological counseling)  · Do not be  alone:Call your Safe Contact- someone whom you trust who will be there for you.  · Call your local CRISIS HOTLINE 907-9362 or 599-770-5927  · Call your local Children's Mobile Crisis Response Team [place] (911) 088-8623 or www.[employer]  · Call the toll free National Suicide Prevention Hotlines   · National Suicide Prevention Lifeline 934-741-FKOM (5954)  · [employer] Line Network 800-SUICIDE (075-6957)    Implanted Port Removal  Introduction  Implanted port removal is a procedure to remove the port and catheter (port-a-cath) that is implanted under your skin. The port is a small disc under your skin that can be punctured with a needle. It is connected to a vein in your chest or neck by a small flexible tube (catheter). The port-a-cath is used for treatment through an IV tube and for taking blood samples.  Your health care provider will remove the port-a-cath if:  You no longer need it for treatment.  It is not working properly.  The area around it gets infected.  Tell a health care provider about:  Any allergies you have.  All medicines you are taking, including vitamins, herbs, eye drops, creams, and over-the-counter medicines.  Any problems you or family members have had with anesthetic medicines.  Any blood disorders you have.  Any surgeries you have had.  Any medical conditions you have.  Whether you are pregnant or may be pregnant.  What are the risks?  Generally, this is a safe procedure. However, problems may occur, including:  Infection.  Bleeding.  Allergic reactions to anesthetic medicines.  Damage to nerves or blood vessels.  What happens before the procedure?  You will have:  A physical exam.  Blood tests.  Imaging tests, including a chest X-ray.  Follow instructions from your health care provider about eating or drinking restrictions.  Ask your health care provider about:  Changing or stopping your regular medicines. This is especially important if you are taking diabetes medicines or  blood thinners.  Taking medicines such as aspirin and ibuprofen. These medicines can thin your blood. Do not take these medicines before your procedure if your surgeon instructs you not to.  Ask your health care provider how your surgical site will be marked or identified.  You may be given antibiotic medicine to help prevent infection.  Plan to have someone take you home after the procedure.  If you will be going home right after the procedure, plan to have someone stay with you for 24 hours.  What happens during the procedure?  To reduce your risk of infection:  Your health care team will wash or sanitize their hands.  Your skin will be washed with soap.  You may be given one or more of the following:  A medicine to help you relax (sedative).  A medicine to numb the area (local anesthetic).  A small cut (incision) will be made at the site of your port-a-cath.  The port-a-cath and the catheter that has been inside your vein will gently be removed.  The incision will be closed with stitches (sutures), adhesive strips, or skin glue.  A bandage (dressing) will be placed over the incision.  The procedure may vary among health care providers and hospitals.  What happens after the procedure?  Your blood pressure, heart rate, breathing rate, and blood oxygen level will be monitored often until the medicines you were given have worn off.  Do not drive for 24 hours if you received a sedative.  This information is not intended to replace advice given to you by your health care provider. Make sure you discuss any questions you have with your health care provider.  Document Released: 11/28/2016 Document Revised: 05/25/2017 Document Reviewed: 09/21/2016  © 2017 Elsevier  ·

## 2019-04-16 ENCOUNTER — NON-PROVIDER VISIT (OUTPATIENT)
Dept: WOUND CARE | Facility: MEDICAL CENTER | Age: 74
End: 2019-04-16
Attending: UROLOGY
Payer: MEDICARE

## 2019-04-16 PROCEDURE — 99211 OFF/OP EST MAY X REQ PHY/QHP: CPT

## 2019-04-16 NOTE — WOUND TEAM
"Advanced Wound Care  [facility]  [address]  [place] [address]  [phone] Fax#    Ostomy Encounter  For 90 Day Certification Period:  4/3/19-7/3/19    Referring Provider:  Jaquan Ritter M.D  Primary Provider: Anthony Oh M.D  Consulting Providers:  Surgeon: Jaquan Ritter M.D      Start of Care: 4/3/19  Reason for referral: Z43.6 (ICD-10-CM) - Attention to urostomy      SUBJECTIVE: \"The appliance lasted three days then we changed it1.\"    HPI: 74 year old female presents for follow-up with urostomy care post bladder cancer surgery with ileal conduit urinary diversion creation.    From 3/19/19 Dr. Patel's HPI: 74 y.o. female with a past medical history of bladder cancer status post neoadjuvant chemotherapy and anterior pelvic exenteration on 3/7/19 by Dr. Ritter who presented 3/19/2019 with left flank pain.  Apparently the patient had a left ureteral catheter removed today by her urologist at around 11 AM.  3 hours later she developed nausea with left flank pain and a fever of 101.4 for which she presented to the ER.  Patient was diagnosed with muscle invasive bladder cancer for which she underwent neoadjuvant chemotherapy followed by anterior pelvic exenteration, extended pelvic lymphadenectomy and ileal conduit urinary diversion on 3/7/19.  Patient reported severe flank pain for which she was given fentanyl en route to the hospital due to which she is currently somnolent but arousable.  She denies any chest pain, headache, shortness of breath or diarrhea.  The ER physician consulted Dr Ritter who recommended IV antibiotics and IV fluids, he will see her in the morning.    Past Medical Hx:   Past Medical History:   Diagnosis Date   • Arthritis    • Blood clotting disorder (HCC) 08/2018    when she had a bladder tumor, passing large clots   • Cancer (HCC) 08/2018    Bladder CA/tumor   • Cancer (HCC) 1980    R Breast/lumpectomy   • Dental disorder     dentures-upper "   • High cholesterol    • Hypertension    • Renal disorder     kidney stone   • Urinary bladder disorder     cancer   • Urinary incontinence        Surgical Hx:   Past Surgical History:   Procedure Laterality Date   • PELVIC EXENTERATION  3/7/2019    Procedure: PELVIC EXENTERATION-  ANTERIOR;  Surgeon: Jaquan Ritter M.D.;  Location: SURGERY [facility];  Service: Urology   • LYMPHADENECTOMY Bilateral 3/7/2019    Procedure: LYMPHADENECTOMY- FOR: PELVIC LYMPH NODE DISSECTION;  Surgeon: Jaquan Ritter M.D.;  Location: SURGERY [facility];  Service: Urology   • URETHRECTOMY  3/7/2019    Procedure: URETHRECTOMY- EN BLOC;  Surgeon: Jaquan Ritter M.D.;  Location: SURGERY [facility];  Service: Urology   • ILEO LOOP DIVERSION  3/7/2019    Procedure: ILEO LOOP DIVERSION-  FOR: ILEAL CONDUIT URINARY DIVERSION;  Surgeon: Jaquan Ritter M.D.;  Location: SURGERY [facility];  Service: Urology   • OTHER  08/2018    Tumor removed from Bladder   • OTHER      breast biopsy   • TONSILLECTOMY         Medications:   Current Outpatient Prescriptions on File Prior to Visit   Medication Sig Dispense Refill   • acetaminophen (TYLENOL) 325 MG Tab Take 2 Tabs by mouth every 6 hours as needed (Mild Pain; (Pain scale 1-3); Temp greater than 100.5 F). 30 Tab 0   • lactobacillus rhamnosus (CULTURELLE) Cap capsule Take 1 Cap by mouth every morning with breakfast. 30 Cap 2   • cyanocobalamin (VITAMIN B12) 1000 MCG Tab Take 1 Tab by mouth every day. 30 Tab 3   • tramadol (ULTRAM) 50 MG Tab Take 25-50 mg by mouth every four hours as needed for Mild Pain.     • docusate sodium (COLACE) 100 MG Cap Take 1 Cap by mouth 2 times a day. 60 Cap 0   • atorvastatin (LIPITOR) 20 MG Tab Take 20 mg by mouth every evening.     • carvedilol (COREG) 12.5 MG Tab Take 12.5 mg by mouth 2 times a day, with meals.       No current facility-administered medications on file prior to visit.        Allergies: Patient has no known allergies.    Social Hx:  "  Social History     Social History   • Marital status:      Spouse name: N/A   • Number of children: N/A   • Years of education: N/A     Occupational History   • Not on file.     Social History Main Topics   • Smoking status: Former Smoker     Packs/day: 1.00     Years: 15.00     Types: Cigarettes     Quit date: 1/1/1983   • Smokeless tobacco: Never Used   • Alcohol use No   • Drug use: No   • Sexual activity: Not on file     Other Topics Concern   • Not on file     Social History Narrative   • No narrative on file       OBJECTIVE:     STOMA ASSESSMENT:   Type:  ____Ileostomy     ____Colostomy    __x__Urostomy    ____Other     Location: RLQ       Size: 1\"   Shape: slightly oval   Color: red moist   Protrudes:      ___ >1 inch               __x_ <1 inch   [place]: pointing 6:00 into skin    Mucocutaneous Junction:  Intact with sutures   Skin indentations, creases or scar tissue: midline incisional scar, crease superior when seated   Jennifer-stomal Skin Problems: circumferential red irritation resolved today   Effluent / Flatus: ample light yellow   Photo  __x__ Yes ____ No    Pouching Procedure:   Old appliance removed.    Peristomal skin cleansed with: washcloth & water   Peristomal skin treated with:  Ostomy appliance used: [place] medical adhesive spray, then [employer] cera plus slim paste ring, 1 piece kar [id number] 1\" CTF urostomy pouch. Added a stoma belt .                Patient Education:   Verbal/demonstration instructions of above pouching procedure provided to patient/family.      Response: spouse is mainly changing appliance. He has a good understanding    PLAN: If the convex 1\" CTF 1 piece works well, we will place order for the same 1 piece appliance in precut 1\" from [employer]    Supplies Needed:   Appliance type:    As above             Other: cera plus paste ring     Accessories:    Stoma belt     Supplier: [employer] currently - want to switch to [employer]    Frequency:   1x/week when peristomal irritation " resolving      Professional Collaboration:  Evaluation notes forwarded to referring provider.      At the time of each visit, a thorough assessment of the patient is completed to assure appropriateness of our plan of care.  The plan of care may need to be adapted from the original plan of care to address any significant changes in patient status.    Clinician Signature:  _________________________________  Date:  ____________    Physician Signature:  ________________________________  Date:  ____________

## 2019-04-16 NOTE — PATIENT INSTRUCTIONS
Change urostomies  Change appliance immediately if it is leaking or peristomal skin feels irritated, has itching, or  burning.   To change the appliance, remove previous appliance, cleanse peristomal skin with warm water/washcloth, pat dry, make an ostomy template or use cardboard measuring guide and trace ostomy shape onto back of barrier, cut out barrier, apply a paste ring around barrier opening and apply appliance. Empty pouches when no more than ½ full. Check contents every 2 hours or as needed. Do not leave soap residue on tissue and do not use baby wipes or skin prep wipes.     Should you experience any significant changes in your condition, such as infection (redness, swelling, localized heat, increased pain, fever > 101 F, chills) or have any questions regarding your home care instructions, please contact the wound center at [phone]. If after hours, contact your primary care physician or go to the hospital emergency room.

## 2019-04-30 ENCOUNTER — NON-PROVIDER VISIT (OUTPATIENT)
Dept: WOUND CARE | Facility: MEDICAL CENTER | Age: 74
End: 2019-04-30
Attending: UROLOGY
Payer: MEDICARE

## 2019-04-30 PROCEDURE — 99211 OFF/OP EST MAY X REQ PHY/QHP: CPT

## 2019-04-30 NOTE — PATIENT INSTRUCTIONS
Change urostomies every 3-5 days.  Change appliance immediately if it is leaking or peristomal skin feels irritated, has itching, or  burning.   To change the appliance, remove previous appliance, cleanse peristomal skin with warm water/washcloth, pat dry, make an ostomy template or use cardboard measuring guide and trace ostomy shape onto back of barrier, cut out barrier, apply a paste ring around barrier opening and apply appliance. Empty pouches when no more than ½ full. Check contents every 2 hours or as needed. Do not leave soap residue on tissue and do not use baby wipes or skin prep wipes.     Should you experience any significant changes in your condition, such as infection (redness, swelling, localized heat, increased pain, fever > 101 F, chills) or have any questions regarding your home care instructions, please contact the wound center at [phone]. If after hours, contact your primary care physician or go to the hospital emergency room.

## 2019-04-30 NOTE — WOUND TEAM
"Advanced Wound Care  [facility]  [address]  [place] [address]  [phone] Fax#    Ostomy Encounter  For 90 Day Certification Period:  4/3/19-7/3/19    Referring Provider:  Jaquan Ritter M.D  Primary Provider: Anthony Oh M.D  Consulting Providers:  Surgeon: Jaquan Ritter M.D      Start of Care: 4/3/19  Reason for referral: Z43.6 (ICD-10-CM) - Attention to urostomy      SUBJECTIVE: \"The appliance lasted three days again.\"    HPI: 74 year old female presents for follow-up with urostomy care post bladder cancer surgery with ileal conduit urinary diversion creation.    From 3/19/19 Dr. Patel's HPI: 74 y.o. female with a past medical history of bladder cancer status post neoadjuvant chemotherapy and anterior pelvic exenteration on 3/7/19 by Dr. Ritter who presented 3/19/2019 with left flank pain.  Apparently the patient had a left ureteral catheter removed today by her urologist at around 11 AM.  3 hours later she developed nausea with left flank pain and a fever of 101.4 for which she presented to the ER.  Patient was diagnosed with muscle invasive bladder cancer for which she underwent neoadjuvant chemotherapy followed by anterior pelvic exenteration, extended pelvic lymphadenectomy and ileal conduit urinary diversion on 3/7/19.  Patient reported severe flank pain for which she was given fentanyl en route to the hospital due to which she is currently somnolent but arousable.  She denies any chest pain, headache, shortness of breath or diarrhea.  The ER physician consulted Dr Ritter who recommended IV antibiotics and IV fluids, he will see her in the morning.    Past Medical Hx:   Past Medical History:   Diagnosis Date   • Arthritis    • Blood clotting disorder (HCC) 08/2018    when she had a bladder tumor, passing large clots   • Cancer (HCC) 08/2018    Bladder CA/tumor   • Cancer (HCC) 1980    R Breast/lumpectomy   • Dental disorder     dentures-upper   • High " cholesterol    • Hypertension    • Renal disorder     kidney stone   • Urinary bladder disorder     cancer   • Urinary incontinence        Surgical Hx:   Past Surgical History:   Procedure Laterality Date   • PELVIC EXENTERATION  3/7/2019    Procedure: PELVIC EXENTERATION-  ANTERIOR;  Surgeon: Jaquan Ritter M.D.;  Location: SURGERY [facility];  Service: Urology   • LYMPHADENECTOMY Bilateral 3/7/2019    Procedure: LYMPHADENECTOMY- FOR: PELVIC LYMPH NODE DISSECTION;  Surgeon: Jaquan Ritter M.D.;  Location: SURGERY [facility];  Service: Urology   • URETHRECTOMY  3/7/2019    Procedure: URETHRECTOMY- EN BLOC;  Surgeon: Jaquan Ritter M.D.;  Location: SURGERY [facility];  Service: Urology   • ILEO LOOP DIVERSION  3/7/2019    Procedure: ILEO LOOP DIVERSION-  FOR: ILEAL CONDUIT URINARY DIVERSION;  Surgeon: Jaquan Ritter M.D.;  Location: SURGERY [facility];  Service: Urology   • OTHER  08/2018    Tumor removed from Bladder   • OTHER      breast biopsy   • TONSILLECTOMY         Medications:   Current Outpatient Prescriptions on File Prior to Visit   Medication Sig Dispense Refill   • acetaminophen (TYLENOL) 325 MG Tab Take 2 Tabs by mouth every 6 hours as needed (Mild Pain; (Pain scale 1-3); Temp greater than 100.5 F). 30 Tab 0   • lactobacillus rhamnosus (CULTURELLE) Cap capsule Take 1 Cap by mouth every morning with breakfast. 30 Cap 2   • cyanocobalamin (VITAMIN B12) 1000 MCG Tab Take 1 Tab by mouth every day. 30 Tab 3   • tramadol (ULTRAM) 50 MG Tab Take 25-50 mg by mouth every four hours as needed for Mild Pain.     • docusate sodium (COLACE) 100 MG Cap Take 1 Cap by mouth 2 times a day. 60 Cap 0   • atorvastatin (LIPITOR) 20 MG Tab Take 20 mg by mouth every evening.     • carvedilol (COREG) 12.5 MG Tab Take 12.5 mg by mouth 2 times a day, with meals.       No current facility-administered medications on file prior to visit.        Allergies: Patient has no known allergies.    Social Hx:   Social  "History     Social History   • Marital status:      Spouse name: N/A   • Number of children: N/A   • Years of education: N/A     Occupational History   • Not on file.     Social History Main Topics   • Smoking status: Former Smoker     Packs/day: 1.00     Years: 15.00     Types: Cigarettes     Quit date: 1/1/1983   • Smokeless tobacco: Never Used   • Alcohol use No   • Drug use: No   • Sexual activity: Not on file     Other Topics Concern   • Not on file     Social History Narrative   • No narrative on file       OBJECTIVE:     STOMA ASSESSMENT:   Type:  ____Ileostomy     ____Colostomy    __x__Urostomy    ____Other     Location: RLQ       Size: 1\"   Shape: slightly oval   Color: red moist   Protrudes:      ___ >1 inch               __x_ <1 inch   [place]: pointing 6:00 into skin    Mucocutaneous Junction:  Intact    Skin indentations, creases or scar tissue: midline incisional scar, crease superior when seated   Jennifer-stomal Skin Problems: circumferential red irritation resolved today   Effluent / Flatus: ample light yellow   Photo  __x__ Yes ____ No    Pouching Procedure:   Old appliance removed.    Peristomal skin cleansed with: washcloth & water   Peristomal skin treated with:  Ostomy appliance used: [place] medical adhesive spray, then [employer] cera plus slim paste ring, 1 piece kar 8474 1\" CTF urostomy pouch. Added a stoma belt .                Patient Education:   Verbal/demonstration instructions of above pouching procedure provided to patient/family.      Response: spouse is mainly changing appliance. He has a good understanding    PLAN: the convex 1\" CTF 1 piece works well,  placed order for the same 1 piece appliance in precut 1\" [id number] from [employer]     Supplies Needed:   Appliance type:    As above             Other: cera plus paste ring     Accessories:    Stoma belt     Supplier:  switch to [employer] - order placed today.    Frequency:   F/u in 3 weeks, then go prn if all is well    Professional " Collaboration:  Evaluation notes forwarded to referring provider.      At the time of each visit, a thorough assessment of the patient is completed to assure appropriateness of our plan of care.  The plan of care may need to be adapted from the original plan of care to address any significant changes in patient status.    Clinician Signature:  _________________________________  Date:  ____________    Physician Signature:  ________________________________  Date:  ____________

## 2019-05-06 NOTE — NON-PROVIDER
Returned patient phone call regarding supplies; ordered her kar medical adhesive spray remover & nighttime down drain bedside bag from [facility].

## 2019-05-21 ENCOUNTER — NON-PROVIDER VISIT (OUTPATIENT)
Dept: WOUND CARE | Facility: MEDICAL CENTER | Age: 74
End: 2019-05-21
Attending: UROLOGY
Payer: MEDICARE

## 2019-05-21 PROCEDURE — 99211 OFF/OP EST MAY X REQ PHY/QHP: CPT

## 2019-05-21 NOTE — WOUND TEAM
"Advanced Wound Care  [facility]  [address]  [place] [address]  [phone] Fax#    Ostomy Encounter  For 90 Day Certification Period:  4/3/19-7/3/19    Referring Provider:  Jaquan Ritter M.D  Primary Provider: Anthony Oh M.D  Consulting Providers:  Surgeon: Jaquan Ritter M.D      Start of Care: 4/3/19  Reason for referral: Z43.6 (ICD-10-CM) - Attention to urostomy      SUBJECTIVE: \"The appliance lasted 4 days this time.\"    HPI: 74 year old female presents for follow-up with urostomy care post bladder cancer surgery with ileal conduit urinary diversion creation.    From 3/19/19 Dr. Patel's HPI: 74 y.o. female with a past medical history of bladder cancer status post neoadjuvant chemotherapy and anterior pelvic exenteration on 3/7/19 by Dr. Ritter who presented 3/19/2019 with left flank pain.  Apparently the patient had a left ureteral catheter removed today by her urologist at around 11 AM.  3 hours later she developed nausea with left flank pain and a fever of 101.4 for which she presented to the ER.  Patient was diagnosed with muscle invasive bladder cancer for which she underwent neoadjuvant chemotherapy followed by anterior pelvic exenteration, extended pelvic lymphadenectomy and ileal conduit urinary diversion on 3/7/19.  Patient reported severe flank pain for which she was given fentanyl en route to the hospital due to which she is currently somnolent but arousable.  She denies any chest pain, headache, shortness of breath or diarrhea.  The ER physician consulted Dr Ritter who recommended IV antibiotics and IV fluids, he will see her in the morning.    Past Medical Hx:   Past Medical History:   Diagnosis Date   • Arthritis    • Blood clotting disorder (HCC) 08/2018    when she had a bladder tumor, passing large clots   • Cancer (HCC) 08/2018    Bladder CA/tumor   • Cancer (HCC) 1980    R Breast/lumpectomy   • Dental disorder     dentures-upper   • High " cholesterol    • Hypertension    • Renal disorder     kidney stone   • Urinary bladder disorder     cancer   • Urinary incontinence        Surgical Hx:   Past Surgical History:   Procedure Laterality Date   • PELVIC EXENTERATION  3/7/2019    Procedure: PELVIC EXENTERATION-  ANTERIOR;  Surgeon: Jaquan Ritter M.D.;  Location: SURGERY [facility];  Service: Urology   • LYMPHADENECTOMY Bilateral 3/7/2019    Procedure: LYMPHADENECTOMY- FOR: PELVIC LYMPH NODE DISSECTION;  Surgeon: Jaquan Ritetr M.D.;  Location: SURGERY [facility];  Service: Urology   • URETHRECTOMY  3/7/2019    Procedure: URETHRECTOMY- EN BLOC;  Surgeon: Jaquan Ritter M.D.;  Location: SURGERY [facility];  Service: Urology   • ILEO LOOP DIVERSION  3/7/2019    Procedure: ILEO LOOP DIVERSION-  FOR: ILEAL CONDUIT URINARY DIVERSION;  Surgeon: Jaquan Ritter M.D.;  Location: SURGERY [facility];  Service: Urology   • OTHER  08/2018    Tumor removed from Bladder   • OTHER      breast biopsy   • TONSILLECTOMY         Medications:   Current Outpatient Prescriptions on File Prior to Visit   Medication Sig Dispense Refill   • acetaminophen (TYLENOL) 325 MG Tab Take 2 Tabs by mouth every 6 hours as needed (Mild Pain; (Pain scale 1-3); Temp greater than 100.5 F). 30 Tab 0   • lactobacillus rhamnosus (CULTURELLE) Cap capsule Take 1 Cap by mouth every morning with breakfast. 30 Cap 2   • cyanocobalamin (VITAMIN B12) 1000 MCG Tab Take 1 Tab by mouth every day. 30 Tab 3   • tramadol (ULTRAM) 50 MG Tab Take 25-50 mg by mouth every four hours as needed for Mild Pain.     • docusate sodium (COLACE) 100 MG Cap Take 1 Cap by mouth 2 times a day. 60 Cap 0   • atorvastatin (LIPITOR) 20 MG Tab Take 20 mg by mouth every evening.     • carvedilol (COREG) 12.5 MG Tab Take 12.5 mg by mouth 2 times a day, with meals.       No current facility-administered medications on file prior to visit.        Allergies: Patient has no known allergies.    Social Hx:   Social  "History     Social History   • Marital status:      Spouse name: N/A   • Number of children: N/A   • Years of education: N/A     Occupational History   • Not on file.     Social History Main Topics   • Smoking status: Former Smoker     Packs/day: 1.00     Years: 15.00     Types: Cigarettes     Quit date: 1/1/1983   • Smokeless tobacco: Never Used   • Alcohol use No   • Drug use: No   • Sexual activity: Not on file     Other Topics Concern   • Not on file     Social History Narrative   • No narrative on file       OBJECTIVE:     STOMA ASSESSMENT:   Type:  ____Ileostomy     ____Colostomy    __x__Urostomy    ____Other     Location: RLQ       Size: 1\"   Shape: slightly oval   Color: red moist   Protrudes:      ___ >1 inch               __x_ <1 inch   [place]: pointing 6:00 into skin    Mucocutaneous Junction:  Intact    Skin indentations, creases or scar tissue: midline incisional scar, crease superior when seated   Jennifer-stomal Skin Problems: circumferential red irritation resolved today   Effluent / Flatus: ample light yellow   Photo  __x__ Yes ____ No    Pouching Procedure:   Old appliance removed.    Peristomal skin cleansed with: washcloth & water   Peristomal skin treated with:  Ostomy appliance used: [employer] medical adhesive spray, then [employer] cera plus slim paste ring, 1 piece [employer] 8474 1\" CTF urostomy pouch then a stoma belt .                Patient Education:   Verbal/demonstration instructions of above pouching procedure provided to patient/family.      Response: spouse is mainly changing appliance. He has a good understanding    PLAN: f/u PR[facility] for problems. Cx scheduled appts     Supplies Needed:   Appliance type:    As above             Other: cera plus paste ring     Accessories:    Stoma belt     Supplier:   [employer] - pt instr to call every month for supply order, at least a week ahead of time    Frequency:    prn     Professional Collaboration:  Evaluation notes forwarded to referring " provider.      At the time of each visit, a thorough assessment of the patient is completed to assure appropriateness of our plan of care.  The plan of care may need to be adapted from the original plan of care to address any significant changes in patient status.    Clinician Signature:  _________________________________  Date:  ____________    Physician Signature:  ________________________________  Date:  ____________

## 2019-05-21 NOTE — PATIENT INSTRUCTIONS
Change urostomies every 3-5 days.  Change appliance immediately if it is leaking or peristomal skin feels irritated, has itching, or  burning.   To change the appliance, remove previous appliance, cleanse peristomal skin with warm water/washcloth, pat dry, make an ostomy template or use cardboard measuring guide and trace ostomy shape onto back of barrier, cut out barrier, apply a paste ring around barrier opening and apply appliance. Empty pouches when no more than ½ full. Check contents every 2 hours or as needed. Do not leave soap residue on tissue and do not use baby wipes or skin prep wipes.     Should you experience any significant changes in your condition, such as infection (redness, swelling, localized heat, increased pain, fever > 101 F, chills) or have any questions regarding your home care instructions, please contact the wound center at [phone]. If after hours, contact your primary care physician or go to the hospital emergency room.

## 2019-05-23 ENCOUNTER — HOSPITAL ENCOUNTER (OUTPATIENT)
Dept: LAB | Facility: MEDICAL CENTER | Age: 74
End: 2019-05-23
Attending: INTERNAL MEDICINE
Payer: MEDICARE

## 2019-05-23 LAB
ALBUMIN SERPL BCP-MCNC: 4.2 G/DL (ref 3.2–4.9)
ALBUMIN/GLOB SERPL: 2 G/DL
ALP SERPL-CCNC: 59 U/L (ref 30–99)
ALT SERPL-CCNC: 7 U/L (ref 2–50)
ANION GAP SERPL CALC-SCNC: 7 MMOL/L (ref 0–11.9)
AST SERPL-CCNC: 15 U/L (ref 12–45)
BILIRUB SERPL-MCNC: 0.4 MG/DL (ref 0.1–1.5)
BUN SERPL-MCNC: 20 MG/DL (ref 8–22)
CALCIUM SERPL-MCNC: 9.3 MG/DL (ref 8.5–10.5)
CHLORIDE SERPL-SCNC: 110 MMOL/L (ref 96–112)
CO2 SERPL-SCNC: 26 MMOL/L (ref 20–33)
CREAT SERPL-MCNC: 0.9 MG/DL (ref 0.5–1.4)
GLOBULIN SER CALC-MCNC: 2.1 G/DL (ref 1.9–3.5)
GLUCOSE SERPL-MCNC: 103 MG/DL (ref 65–99)
MAGNESIUM SERPL-MCNC: 1.9 MG/DL (ref 1.5–2.5)
POTASSIUM SERPL-SCNC: 4.4 MMOL/L (ref 3.6–5.5)
PROT SERPL-MCNC: 6.3 G/DL (ref 6–8.2)
SODIUM SERPL-SCNC: 143 MMOL/L (ref 135–145)

## 2019-05-23 PROCEDURE — 36415 COLL VENOUS BLD VENIPUNCTURE: CPT

## 2019-05-23 PROCEDURE — 83735 ASSAY OF MAGNESIUM: CPT

## 2019-05-23 PROCEDURE — 80053 COMPREHEN METABOLIC PANEL: CPT

## 2019-06-19 ENCOUNTER — HOSPITAL ENCOUNTER (OUTPATIENT)
Dept: LAB | Facility: MEDICAL CENTER | Age: 74
End: 2019-06-19
Attending: INTERNAL MEDICINE
Payer: MEDICARE

## 2019-06-19 LAB
ALBUMIN SERPL BCP-MCNC: 4 G/DL (ref 3.2–4.9)
ALBUMIN/GLOB SERPL: 1.7 G/DL
ALP SERPL-CCNC: 76 U/L (ref 30–99)
ALT SERPL-CCNC: 9 U/L (ref 2–50)
ANION GAP SERPL CALC-SCNC: 9 MMOL/L (ref 0–11.9)
AST SERPL-CCNC: 15 U/L (ref 12–45)
BASOPHILS # BLD AUTO: 0.6 % (ref 0–1.8)
BASOPHILS # BLD: 0.03 K/UL (ref 0–0.12)
BILIRUB SERPL-MCNC: 0.4 MG/DL (ref 0.1–1.5)
BUN SERPL-MCNC: 16 MG/DL (ref 8–22)
CALCIUM SERPL-MCNC: 9.8 MG/DL (ref 8.5–10.5)
CHLORIDE SERPL-SCNC: 107 MMOL/L (ref 96–112)
CO2 SERPL-SCNC: 25 MMOL/L (ref 20–33)
CREAT SERPL-MCNC: 0.87 MG/DL (ref 0.5–1.4)
EOSINOPHIL # BLD AUTO: 0.11 K/UL (ref 0–0.51)
EOSINOPHIL NFR BLD: 2.1 % (ref 0–6.9)
ERYTHROCYTE [DISTWIDTH] IN BLOOD BY AUTOMATED COUNT: 46.6 FL (ref 35.9–50)
FIBRINOGEN PPP-MCNC: 366 MG/DL (ref 215–460)
FOLATE SERPL-MCNC: 7.6 NG/ML
GLOBULIN SER CALC-MCNC: 2.3 G/DL (ref 1.9–3.5)
GLUCOSE SERPL-MCNC: 99 MG/DL (ref 65–99)
HCT VFR BLD AUTO: 41.4 % (ref 37–47)
HGB BLD-MCNC: 13.6 G/DL (ref 12–16)
IMM GRANULOCYTES # BLD AUTO: 0.01 K/UL (ref 0–0.11)
IMM GRANULOCYTES NFR BLD AUTO: 0.2 % (ref 0–0.9)
LYMPHOCYTES # BLD AUTO: 1.63 K/UL (ref 1–4.8)
LYMPHOCYTES NFR BLD: 31.3 % (ref 22–41)
MCH RBC QN AUTO: 31.4 PG (ref 27–33)
MCHC RBC AUTO-ENTMCNC: 32.9 G/DL (ref 33.6–35)
MCV RBC AUTO: 95.6 FL (ref 81.4–97.8)
MONOCYTES # BLD AUTO: 0.6 K/UL (ref 0–0.85)
MONOCYTES NFR BLD AUTO: 11.5 % (ref 0–13.4)
NEUTROPHILS # BLD AUTO: 2.82 K/UL (ref 2–7.15)
NEUTROPHILS NFR BLD: 54.3 % (ref 44–72)
NRBC # BLD AUTO: 0 K/UL
NRBC BLD-RTO: 0 /100 WBC
PLATELET # BLD AUTO: 163 K/UL (ref 164–446)
PMV BLD AUTO: 10.4 FL (ref 9–12.9)
POTASSIUM SERPL-SCNC: 4.3 MMOL/L (ref 3.6–5.5)
PROT SERPL-MCNC: 6.3 G/DL (ref 6–8.2)
RBC # BLD AUTO: 4.33 M/UL (ref 4.2–5.4)
SODIUM SERPL-SCNC: 141 MMOL/L (ref 135–145)
VIT B12 SERPL-MCNC: 629 PG/ML (ref 211–911)
WBC # BLD AUTO: 5.2 K/UL (ref 4.8–10.8)

## 2019-06-19 PROCEDURE — 82746 ASSAY OF FOLIC ACID SERUM: CPT

## 2019-06-19 PROCEDURE — 80053 COMPREHEN METABOLIC PANEL: CPT

## 2019-06-19 PROCEDURE — 85384 FIBRINOGEN ACTIVITY: CPT

## 2019-06-19 PROCEDURE — 82607 VITAMIN B-12: CPT

## 2019-06-19 PROCEDURE — 36415 COLL VENOUS BLD VENIPUNCTURE: CPT

## 2019-06-19 PROCEDURE — 85025 COMPLETE CBC W/AUTO DIFF WBC: CPT

## 2019-09-23 ENCOUNTER — HOSPITAL ENCOUNTER (OUTPATIENT)
Dept: LAB | Facility: MEDICAL CENTER | Age: 74
End: 2019-09-23
Attending: UROLOGY
Payer: MEDICARE

## 2019-09-23 LAB
ALBUMIN SERPL BCP-MCNC: 4.4 G/DL (ref 3.2–4.9)
ALBUMIN/GLOB SERPL: 2.4 G/DL
ALP SERPL-CCNC: 97 U/L (ref 30–99)
ALT SERPL-CCNC: 19 U/L (ref 2–50)
ANION GAP SERPL CALC-SCNC: 8 MMOL/L (ref 0–11.9)
AST SERPL-CCNC: 19 U/L (ref 12–45)
BASOPHILS # BLD AUTO: 0.9 % (ref 0–1.8)
BASOPHILS # BLD: 0.05 K/UL (ref 0–0.12)
BILIRUB SERPL-MCNC: 0.6 MG/DL (ref 0.1–1.5)
BUN SERPL-MCNC: 21 MG/DL (ref 8–22)
CALCIUM SERPL-MCNC: 9.8 MG/DL (ref 8.5–10.5)
CHLORIDE SERPL-SCNC: 110 MMOL/L (ref 96–112)
CO2 SERPL-SCNC: 25 MMOL/L (ref 20–33)
CREAT SERPL-MCNC: 1.03 MG/DL (ref 0.5–1.4)
EOSINOPHIL # BLD AUTO: 0.19 K/UL (ref 0–0.51)
EOSINOPHIL NFR BLD: 3.6 % (ref 0–6.9)
ERYTHROCYTE [DISTWIDTH] IN BLOOD BY AUTOMATED COUNT: 44 FL (ref 35.9–50)
GLOBULIN SER CALC-MCNC: 1.8 G/DL (ref 1.9–3.5)
GLUCOSE SERPL-MCNC: 88 MG/DL (ref 65–99)
HCT VFR BLD AUTO: 43.3 % (ref 37–47)
HGB BLD-MCNC: 14.4 G/DL (ref 12–16)
IMM GRANULOCYTES # BLD AUTO: 0.01 K/UL (ref 0–0.11)
IMM GRANULOCYTES NFR BLD AUTO: 0.2 % (ref 0–0.9)
LYMPHOCYTES # BLD AUTO: 1.92 K/UL (ref 1–4.8)
LYMPHOCYTES NFR BLD: 36 % (ref 22–41)
MCH RBC QN AUTO: 32.4 PG (ref 27–33)
MCHC RBC AUTO-ENTMCNC: 33.3 G/DL (ref 33.6–35)
MCV RBC AUTO: 97.3 FL (ref 81.4–97.8)
MONOCYTES # BLD AUTO: 0.53 K/UL (ref 0–0.85)
MONOCYTES NFR BLD AUTO: 9.9 % (ref 0–13.4)
NEUTROPHILS # BLD AUTO: 2.63 K/UL (ref 2–7.15)
NEUTROPHILS NFR BLD: 49.4 % (ref 44–72)
NRBC # BLD AUTO: 0 K/UL
NRBC BLD-RTO: 0 /100 WBC
PLATELET # BLD AUTO: 168 K/UL (ref 164–446)
PMV BLD AUTO: 10 FL (ref 9–12.9)
POTASSIUM SERPL-SCNC: 4.5 MMOL/L (ref 3.6–5.5)
PROT SERPL-MCNC: 6.2 G/DL (ref 6–8.2)
RBC # BLD AUTO: 4.45 M/UL (ref 4.2–5.4)
SODIUM SERPL-SCNC: 143 MMOL/L (ref 135–145)
WBC # BLD AUTO: 5.3 K/UL (ref 4.8–10.8)

## 2019-09-23 PROCEDURE — 36415 COLL VENOUS BLD VENIPUNCTURE: CPT

## 2019-09-23 PROCEDURE — 80053 COMPREHEN METABOLIC PANEL: CPT

## 2019-09-23 PROCEDURE — 85025 COMPLETE CBC W/AUTO DIFF WBC: CPT

## 2019-12-11 ENCOUNTER — HOSPITAL ENCOUNTER (OUTPATIENT)
Dept: LAB | Facility: MEDICAL CENTER | Age: 74
End: 2019-12-11
Attending: INTERNAL MEDICINE
Payer: MEDICARE

## 2019-12-11 LAB
ALBUMIN SERPL BCP-MCNC: 4.2 G/DL (ref 3.2–4.9)
ALBUMIN/GLOB SERPL: 2.2 G/DL
ALP SERPL-CCNC: 78 U/L (ref 30–99)
ALT SERPL-CCNC: 12 U/L (ref 2–50)
ANION GAP SERPL CALC-SCNC: 8 MMOL/L (ref 0–11.9)
AST SERPL-CCNC: 15 U/L (ref 12–45)
BASOPHILS # BLD AUTO: 0.8 % (ref 0–1.8)
BASOPHILS # BLD: 0.04 K/UL (ref 0–0.12)
BILIRUB SERPL-MCNC: 0.5 MG/DL (ref 0.1–1.5)
BUN SERPL-MCNC: 21 MG/DL (ref 8–22)
CALCIUM SERPL-MCNC: 9.2 MG/DL (ref 8.5–10.5)
CHLORIDE SERPL-SCNC: 107 MMOL/L (ref 96–112)
CO2 SERPL-SCNC: 28 MMOL/L (ref 20–33)
CREAT SERPL-MCNC: 1.03 MG/DL (ref 0.5–1.4)
EOSINOPHIL # BLD AUTO: 0.2 K/UL (ref 0–0.51)
EOSINOPHIL NFR BLD: 4.1 % (ref 0–6.9)
ERYTHROCYTE [DISTWIDTH] IN BLOOD BY AUTOMATED COUNT: 43.1 FL (ref 35.9–50)
GLOBULIN SER CALC-MCNC: 1.9 G/DL (ref 1.9–3.5)
GLUCOSE SERPL-MCNC: 93 MG/DL (ref 65–99)
HCT VFR BLD AUTO: 43.4 % (ref 37–47)
HGB BLD-MCNC: 14.3 G/DL (ref 12–16)
IMM GRANULOCYTES # BLD AUTO: 0.01 K/UL (ref 0–0.11)
IMM GRANULOCYTES NFR BLD AUTO: 0.2 % (ref 0–0.9)
LYMPHOCYTES # BLD AUTO: 1.81 K/UL (ref 1–4.8)
LYMPHOCYTES NFR BLD: 37.3 % (ref 22–41)
MCH RBC QN AUTO: 32.4 PG (ref 27–33)
MCHC RBC AUTO-ENTMCNC: 32.9 G/DL (ref 33.6–35)
MCV RBC AUTO: 98.2 FL (ref 81.4–97.8)
MONOCYTES # BLD AUTO: 0.52 K/UL (ref 0–0.85)
MONOCYTES NFR BLD AUTO: 10.7 % (ref 0–13.4)
NEUTROPHILS # BLD AUTO: 2.27 K/UL (ref 2–7.15)
NEUTROPHILS NFR BLD: 46.9 % (ref 44–72)
NRBC # BLD AUTO: 0 K/UL
NRBC BLD-RTO: 0 /100 WBC
PLATELET # BLD AUTO: 161 K/UL (ref 164–446)
PMV BLD AUTO: 9.9 FL (ref 9–12.9)
POTASSIUM SERPL-SCNC: 4.6 MMOL/L (ref 3.6–5.5)
PROT SERPL-MCNC: 6.1 G/DL (ref 6–8.2)
RBC # BLD AUTO: 4.42 M/UL (ref 4.2–5.4)
SODIUM SERPL-SCNC: 143 MMOL/L (ref 135–145)
WBC # BLD AUTO: 4.9 K/UL (ref 4.8–10.8)

## 2019-12-11 PROCEDURE — 85025 COMPLETE CBC W/AUTO DIFF WBC: CPT

## 2019-12-11 PROCEDURE — 36415 COLL VENOUS BLD VENIPUNCTURE: CPT

## 2019-12-11 PROCEDURE — 80053 COMPREHEN METABOLIC PANEL: CPT

## 2020-01-14 ENCOUNTER — HOSPITAL ENCOUNTER (OUTPATIENT)
Dept: RADIOLOGY | Facility: MEDICAL CENTER | Age: 75
End: 2020-01-14
Attending: INTERNAL MEDICINE
Payer: MEDICARE

## 2020-01-14 DIAGNOSIS — C67.4 MALIGNANT NEOPLASM OF POSTERIOR WALL OF URINARY BLADDER (HCC): ICD-10-CM

## 2020-01-14 PROCEDURE — 71260 CT THORAX DX C+: CPT

## 2020-01-14 PROCEDURE — 700117 HCHG RX CONTRAST REV CODE 255: Performed by: INTERNAL MEDICINE

## 2020-01-14 RX ADMIN — IOHEXOL 25 ML: 240 INJECTION, SOLUTION INTRATHECAL; INTRAVASCULAR; INTRAVENOUS; ORAL at 15:12

## 2020-01-14 RX ADMIN — IOHEXOL 100 ML: 350 INJECTION, SOLUTION INTRAVENOUS at 15:12

## 2020-03-12 ENCOUNTER — HOSPITAL ENCOUNTER (OUTPATIENT)
Dept: LAB | Facility: MEDICAL CENTER | Age: 75
End: 2020-03-12
Attending: UROLOGY
Payer: MEDICARE

## 2020-03-12 LAB
ALBUMIN SERPL BCP-MCNC: 4.1 G/DL (ref 3.2–4.9)
ALBUMIN/GLOB SERPL: 1.7 G/DL
ALP SERPL-CCNC: 64 U/L (ref 30–99)
ALT SERPL-CCNC: 15 U/L (ref 2–50)
ANION GAP SERPL CALC-SCNC: 6 MMOL/L (ref 7–16)
AST SERPL-CCNC: 16 U/L (ref 12–45)
BASOPHILS # BLD AUTO: 0.5 % (ref 0–1.8)
BASOPHILS # BLD: 0.03 K/UL (ref 0–0.12)
BILIRUB SERPL-MCNC: 0.4 MG/DL (ref 0.1–1.5)
BUN SERPL-MCNC: 28 MG/DL (ref 8–22)
CALCIUM SERPL-MCNC: 9.9 MG/DL (ref 8.5–10.5)
CHLORIDE SERPL-SCNC: 107 MMOL/L (ref 96–112)
CO2 SERPL-SCNC: 28 MMOL/L (ref 20–33)
CREAT SERPL-MCNC: 0.87 MG/DL (ref 0.5–1.4)
EOSINOPHIL # BLD AUTO: 0.14 K/UL (ref 0–0.51)
EOSINOPHIL NFR BLD: 2.5 % (ref 0–6.9)
ERYTHROCYTE [DISTWIDTH] IN BLOOD BY AUTOMATED COUNT: 41.4 FL (ref 35.9–50)
GLOBULIN SER CALC-MCNC: 2.4 G/DL (ref 1.9–3.5)
GLUCOSE SERPL-MCNC: 98 MG/DL (ref 65–99)
HCT VFR BLD AUTO: 40.8 % (ref 37–47)
HGB BLD-MCNC: 14 G/DL (ref 12–16)
IMM GRANULOCYTES # BLD AUTO: 0.02 K/UL (ref 0–0.11)
IMM GRANULOCYTES NFR BLD AUTO: 0.4 % (ref 0–0.9)
LYMPHOCYTES # BLD AUTO: 1.74 K/UL (ref 1–4.8)
LYMPHOCYTES NFR BLD: 30.7 % (ref 22–41)
MCH RBC QN AUTO: 32.2 PG (ref 27–33)
MCHC RBC AUTO-ENTMCNC: 34.3 G/DL (ref 33.6–35)
MCV RBC AUTO: 93.8 FL (ref 81.4–97.8)
MONOCYTES # BLD AUTO: 0.54 K/UL (ref 0–0.85)
MONOCYTES NFR BLD AUTO: 9.5 % (ref 0–13.4)
NEUTROPHILS # BLD AUTO: 3.19 K/UL (ref 2–7.15)
NEUTROPHILS NFR BLD: 56.4 % (ref 44–72)
NRBC # BLD AUTO: 0 K/UL
NRBC BLD-RTO: 0 /100 WBC
PLATELET # BLD AUTO: 152 K/UL (ref 164–446)
PMV BLD AUTO: 9.8 FL (ref 9–12.9)
POTASSIUM SERPL-SCNC: 3.8 MMOL/L (ref 3.6–5.5)
PROT SERPL-MCNC: 6.5 G/DL (ref 6–8.2)
RBC # BLD AUTO: 4.35 M/UL (ref 4.2–5.4)
SODIUM SERPL-SCNC: 141 MMOL/L (ref 135–145)
WBC # BLD AUTO: 5.7 K/UL (ref 4.8–10.8)

## 2020-03-12 PROCEDURE — 80053 COMPREHEN METABOLIC PANEL: CPT

## 2020-03-12 PROCEDURE — 36415 COLL VENOUS BLD VENIPUNCTURE: CPT

## 2020-03-12 PROCEDURE — 85025 COMPLETE CBC W/AUTO DIFF WBC: CPT

## 2020-03-18 ENCOUNTER — HOSPITAL ENCOUNTER (OUTPATIENT)
Dept: RADIOLOGY | Facility: MEDICAL CENTER | Age: 75
End: 2020-03-18
Attending: INTERNAL MEDICINE
Payer: MEDICARE

## 2020-03-18 DIAGNOSIS — C67.4 MALIGNANT NEOPLASM OF POSTERIOR WALL OF URINARY BLADDER (HCC): ICD-10-CM

## 2020-03-18 DIAGNOSIS — Z51.11 ENCOUNTER FOR ANTINEOPLASTIC CHEMOTHERAPY: ICD-10-CM

## 2020-03-18 PROCEDURE — 76536 US EXAM OF HEAD AND NECK: CPT

## 2020-03-18 PROCEDURE — 71250 CT THORAX DX C-: CPT

## 2020-06-17 ENCOUNTER — HOSPITAL ENCOUNTER (OUTPATIENT)
Dept: LAB | Facility: MEDICAL CENTER | Age: 75
End: 2020-06-17
Attending: INTERNAL MEDICINE
Payer: MEDICARE

## 2020-06-17 LAB
ALBUMIN SERPL BCP-MCNC: 4 G/DL (ref 3.2–4.9)
ALBUMIN/GLOB SERPL: 1.7 G/DL
ALP SERPL-CCNC: 74 U/L (ref 30–99)
ALT SERPL-CCNC: 12 U/L (ref 2–50)
ANION GAP SERPL CALC-SCNC: 11 MMOL/L (ref 7–16)
APPEARANCE UR: CLEAR
AST SERPL-CCNC: 17 U/L (ref 12–45)
BACTERIA #/AREA URNS HPF: ABNORMAL /HPF
BASOPHILS # BLD AUTO: 0.4 % (ref 0–1.8)
BASOPHILS # BLD: 0.02 K/UL (ref 0–0.12)
BILIRUB SERPL-MCNC: 0.5 MG/DL (ref 0.1–1.5)
BILIRUB UR QL STRIP.AUTO: NEGATIVE
BUN SERPL-MCNC: 17 MG/DL (ref 8–22)
CALCIUM SERPL-MCNC: 9.7 MG/DL (ref 8.5–10.5)
CHLORIDE SERPL-SCNC: 105 MMOL/L (ref 96–112)
CO2 SERPL-SCNC: 25 MMOL/L (ref 20–33)
COLOR UR: YELLOW
CREAT SERPL-MCNC: 0.85 MG/DL (ref 0.5–1.4)
EOSINOPHIL # BLD AUTO: 0.15 K/UL (ref 0–0.51)
EOSINOPHIL NFR BLD: 2.7 % (ref 0–6.9)
EPI CELLS #/AREA URNS HPF: NEGATIVE /HPF
ERYTHROCYTE [DISTWIDTH] IN BLOOD BY AUTOMATED COUNT: 41.6 FL (ref 35.9–50)
GLOBULIN SER CALC-MCNC: 2.3 G/DL (ref 1.9–3.5)
GLUCOSE SERPL-MCNC: 86 MG/DL (ref 65–99)
GLUCOSE UR STRIP.AUTO-MCNC: NEGATIVE MG/DL
HCT VFR BLD AUTO: 44.2 % (ref 37–47)
HGB BLD-MCNC: 14.9 G/DL (ref 12–16)
HYALINE CASTS #/AREA URNS LPF: ABNORMAL /LPF
IMM GRANULOCYTES # BLD AUTO: 0.02 K/UL (ref 0–0.11)
IMM GRANULOCYTES NFR BLD AUTO: 0.4 % (ref 0–0.9)
KETONES UR STRIP.AUTO-MCNC: NEGATIVE MG/DL
LEUKOCYTE ESTERASE UR QL STRIP.AUTO: ABNORMAL
LYMPHOCYTES # BLD AUTO: 2.1 K/UL (ref 1–4.8)
LYMPHOCYTES NFR BLD: 38.5 % (ref 22–41)
MCH RBC QN AUTO: 32.2 PG (ref 27–33)
MCHC RBC AUTO-ENTMCNC: 33.7 G/DL (ref 33.6–35)
MCV RBC AUTO: 95.5 FL (ref 81.4–97.8)
MICRO URNS: ABNORMAL
MONOCYTES # BLD AUTO: 0.48 K/UL (ref 0–0.85)
MONOCYTES NFR BLD AUTO: 8.8 % (ref 0–13.4)
MUCOUS THREADS #/AREA URNS HPF: ABNORMAL /HPF
NEUTROPHILS # BLD AUTO: 2.69 K/UL (ref 2–7.15)
NEUTROPHILS NFR BLD: 49.2 % (ref 44–72)
NITRITE UR QL STRIP.AUTO: POSITIVE
NRBC # BLD AUTO: 0 K/UL
NRBC BLD-RTO: 0 /100 WBC
PH UR STRIP.AUTO: 7.5 [PH] (ref 5–8)
PLATELET # BLD AUTO: 168 K/UL (ref 164–446)
PMV BLD AUTO: 9.6 FL (ref 9–12.9)
POTASSIUM SERPL-SCNC: 4.6 MMOL/L (ref 3.6–5.5)
PROT SERPL-MCNC: 6.3 G/DL (ref 6–8.2)
PROT UR QL STRIP: NEGATIVE MG/DL
RBC # BLD AUTO: 4.63 M/UL (ref 4.2–5.4)
RBC # URNS HPF: ABNORMAL /HPF
RBC UR QL AUTO: NEGATIVE
SODIUM SERPL-SCNC: 141 MMOL/L (ref 135–145)
SP GR UR STRIP.AUTO: 1.02
UROBILINOGEN UR STRIP.AUTO-MCNC: 0.2 MG/DL
WBC # BLD AUTO: 5.5 K/UL (ref 4.8–10.8)
WBC #/AREA URNS HPF: ABNORMAL /HPF

## 2020-06-17 PROCEDURE — 81001 URINALYSIS AUTO W/SCOPE: CPT

## 2020-06-17 PROCEDURE — 36415 COLL VENOUS BLD VENIPUNCTURE: CPT

## 2020-06-17 PROCEDURE — 85025 COMPLETE CBC W/AUTO DIFF WBC: CPT

## 2020-06-17 PROCEDURE — 80053 COMPREHEN METABOLIC PANEL: CPT

## 2021-02-02 ENCOUNTER — HOSPITAL ENCOUNTER (OUTPATIENT)
Dept: LAB | Facility: MEDICAL CENTER | Age: 76
End: 2021-02-02
Attending: UROLOGY
Payer: MEDICARE

## 2021-02-02 LAB
ALBUMIN SERPL BCP-MCNC: 4.2 G/DL (ref 3.2–4.9)
ALBUMIN/GLOB SERPL: 2 G/DL
ALP SERPL-CCNC: 82 U/L (ref 30–99)
ALT SERPL-CCNC: 15 U/L (ref 2–50)
ANION GAP SERPL CALC-SCNC: 9 MMOL/L (ref 7–16)
AST SERPL-CCNC: 19 U/L (ref 12–45)
BASOPHILS # BLD AUTO: 0.6 % (ref 0–1.8)
BASOPHILS # BLD: 0.04 K/UL (ref 0–0.12)
BILIRUB SERPL-MCNC: 0.3 MG/DL (ref 0.1–1.5)
BUN SERPL-MCNC: 24 MG/DL (ref 8–22)
CALCIUM SERPL-MCNC: 9.9 MG/DL (ref 8.5–10.5)
CHLORIDE SERPL-SCNC: 107 MMOL/L (ref 96–112)
CO2 SERPL-SCNC: 24 MMOL/L (ref 20–33)
CREAT SERPL-MCNC: 0.86 MG/DL (ref 0.5–1.4)
EOSINOPHIL # BLD AUTO: 0.28 K/UL (ref 0–0.51)
EOSINOPHIL NFR BLD: 4.3 % (ref 0–6.9)
ERYTHROCYTE [DISTWIDTH] IN BLOOD BY AUTOMATED COUNT: 42.6 FL (ref 35.9–50)
GLOBULIN SER CALC-MCNC: 2.1 G/DL (ref 1.9–3.5)
GLUCOSE SERPL-MCNC: 95 MG/DL (ref 65–99)
HCT VFR BLD AUTO: 43.1 % (ref 37–47)
HGB BLD-MCNC: 14.9 G/DL (ref 12–16)
IMM GRANULOCYTES # BLD AUTO: 0.01 K/UL (ref 0–0.11)
IMM GRANULOCYTES NFR BLD AUTO: 0.2 % (ref 0–0.9)
LYMPHOCYTES # BLD AUTO: 1.78 K/UL (ref 1–4.8)
LYMPHOCYTES NFR BLD: 27.3 % (ref 22–41)
MCH RBC QN AUTO: 32.7 PG (ref 27–33)
MCHC RBC AUTO-ENTMCNC: 34.6 G/DL (ref 33.6–35)
MCV RBC AUTO: 94.5 FL (ref 81.4–97.8)
MONOCYTES # BLD AUTO: 0.74 K/UL (ref 0–0.85)
MONOCYTES NFR BLD AUTO: 11.4 % (ref 0–13.4)
NEUTROPHILS # BLD AUTO: 3.66 K/UL (ref 2–7.15)
NEUTROPHILS NFR BLD: 56.2 % (ref 44–72)
NRBC # BLD AUTO: 0 K/UL
NRBC BLD-RTO: 0 /100 WBC
PLATELET # BLD AUTO: 172 K/UL (ref 164–446)
PMV BLD AUTO: 9.7 FL (ref 9–12.9)
POTASSIUM SERPL-SCNC: 4.3 MMOL/L (ref 3.6–5.5)
PROT SERPL-MCNC: 6.3 G/DL (ref 6–8.2)
RBC # BLD AUTO: 4.56 M/UL (ref 4.2–5.4)
SODIUM SERPL-SCNC: 140 MMOL/L (ref 135–145)
WBC # BLD AUTO: 6.5 K/UL (ref 4.8–10.8)

## 2021-02-02 PROCEDURE — 80053 COMPREHEN METABOLIC PANEL: CPT

## 2021-02-02 PROCEDURE — 85025 COMPLETE CBC W/AUTO DIFF WBC: CPT

## 2021-02-02 PROCEDURE — 36415 COLL VENOUS BLD VENIPUNCTURE: CPT

## 2021-09-20 ENCOUNTER — HOSPITAL ENCOUNTER (OUTPATIENT)
Dept: LAB | Facility: MEDICAL CENTER | Age: 76
End: 2021-09-20
Attending: UROLOGY
Payer: MEDICARE

## 2021-09-20 LAB
ALBUMIN SERPL BCP-MCNC: 4 G/DL (ref 3.2–4.9)
ALBUMIN/GLOB SERPL: 1.6 G/DL
ALP SERPL-CCNC: 93 U/L (ref 30–99)
ALT SERPL-CCNC: 13 U/L (ref 2–50)
ANION GAP SERPL CALC-SCNC: 9 MMOL/L (ref 7–16)
AST SERPL-CCNC: 16 U/L (ref 12–45)
BASOPHILS # BLD AUTO: 0.6 % (ref 0–1.8)
BASOPHILS # BLD: 0.03 K/UL (ref 0–0.12)
BILIRUB SERPL-MCNC: 0.5 MG/DL (ref 0.1–1.5)
BUN SERPL-MCNC: 22 MG/DL (ref 8–22)
CALCIUM SERPL-MCNC: 9.6 MG/DL (ref 8.5–10.5)
CHLORIDE SERPL-SCNC: 106 MMOL/L (ref 96–112)
CO2 SERPL-SCNC: 26 MMOL/L (ref 20–33)
CREAT SERPL-MCNC: 1.02 MG/DL (ref 0.5–1.4)
EOSINOPHIL # BLD AUTO: 0.21 K/UL (ref 0–0.51)
EOSINOPHIL NFR BLD: 4.2 % (ref 0–6.9)
ERYTHROCYTE [DISTWIDTH] IN BLOOD BY AUTOMATED COUNT: 41.7 FL (ref 35.9–50)
FASTING STATUS PATIENT QL REPORTED: NORMAL
GLOBULIN SER CALC-MCNC: 2.5 G/DL (ref 1.9–3.5)
GLUCOSE SERPL-MCNC: 96 MG/DL (ref 65–99)
HCT VFR BLD AUTO: 44.6 % (ref 37–47)
HGB BLD-MCNC: 15.1 G/DL (ref 12–16)
IMM GRANULOCYTES # BLD AUTO: 0.01 K/UL (ref 0–0.11)
IMM GRANULOCYTES NFR BLD AUTO: 0.2 % (ref 0–0.9)
LYMPHOCYTES # BLD AUTO: 1.99 K/UL (ref 1–4.8)
LYMPHOCYTES NFR BLD: 39.8 % (ref 22–41)
MCH RBC QN AUTO: 31.7 PG (ref 27–33)
MCHC RBC AUTO-ENTMCNC: 33.9 G/DL (ref 33.6–35)
MCV RBC AUTO: 93.7 FL (ref 81.4–97.8)
MONOCYTES # BLD AUTO: 0.47 K/UL (ref 0–0.85)
MONOCYTES NFR BLD AUTO: 9.4 % (ref 0–13.4)
NEUTROPHILS # BLD AUTO: 2.29 K/UL (ref 2–7.15)
NEUTROPHILS NFR BLD: 45.8 % (ref 44–72)
NRBC # BLD AUTO: 0 K/UL
NRBC BLD-RTO: 0 /100 WBC
PLATELET # BLD AUTO: 174 K/UL (ref 164–446)
PMV BLD AUTO: 9.9 FL (ref 9–12.9)
POTASSIUM SERPL-SCNC: 4.3 MMOL/L (ref 3.6–5.5)
PROT SERPL-MCNC: 6.5 G/DL (ref 6–8.2)
RBC # BLD AUTO: 4.76 M/UL (ref 4.2–5.4)
SODIUM SERPL-SCNC: 141 MMOL/L (ref 135–145)
WBC # BLD AUTO: 5 K/UL (ref 4.8–10.8)

## 2021-09-20 PROCEDURE — 36415 COLL VENOUS BLD VENIPUNCTURE: CPT

## 2021-09-20 PROCEDURE — 85025 COMPLETE CBC W/AUTO DIFF WBC: CPT

## 2021-09-20 PROCEDURE — 80053 COMPREHEN METABOLIC PANEL: CPT

## 2021-12-15 ENCOUNTER — HOSPITAL ENCOUNTER (OUTPATIENT)
Dept: RADIOLOGY | Facility: MEDICAL CENTER | Age: 76
End: 2021-12-15
Attending: INTERNAL MEDICINE
Payer: MEDICARE

## 2021-12-15 ENCOUNTER — HOSPITAL ENCOUNTER (OUTPATIENT)
Dept: RADIOLOGY | Facility: MEDICAL CENTER | Age: 76
End: 2021-12-15
Attending: UROLOGY
Payer: MEDICARE

## 2021-12-15 ENCOUNTER — HOSPITAL ENCOUNTER (OUTPATIENT)
Dept: LAB | Facility: MEDICAL CENTER | Age: 76
End: 2021-12-15
Attending: UROLOGY
Payer: MEDICARE

## 2021-12-15 DIAGNOSIS — C67.4 MALIGNANT NEOPLASM OF POSTERIOR WALL OF URINARY BLADDER (HCC): ICD-10-CM

## 2021-12-15 DIAGNOSIS — C67.9 MALIGNANT NEOPLASM OF BLADDER WALL (HCC): ICD-10-CM

## 2021-12-15 LAB — CREAT SERPL-MCNC: 0.9 MG/DL (ref 0.5–1.4)

## 2021-12-15 PROCEDURE — 700117 HCHG RX CONTRAST REV CODE 255: Performed by: UROLOGY

## 2021-12-15 PROCEDURE — 74178 CT ABD&PLV WO CNTR FLWD CNTR: CPT | Mod: MH

## 2021-12-15 PROCEDURE — 71250 CT THORAX DX C-: CPT | Mod: MH

## 2021-12-15 PROCEDURE — 36415 COLL VENOUS BLD VENIPUNCTURE: CPT

## 2021-12-15 PROCEDURE — 82565 ASSAY OF CREATININE: CPT

## 2021-12-15 RX ADMIN — IOHEXOL 100 ML: 350 INJECTION, SOLUTION INTRAVENOUS at 13:37

## 2022-03-01 ENCOUNTER — HOSPITAL ENCOUNTER (OUTPATIENT)
Dept: LAB | Facility: MEDICAL CENTER | Age: 77
End: 2022-03-01
Attending: INTERNAL MEDICINE
Payer: MEDICARE

## 2022-03-01 ENCOUNTER — HOSPITAL ENCOUNTER (OUTPATIENT)
Dept: LAB | Facility: MEDICAL CENTER | Age: 77
End: 2022-03-01
Attending: UROLOGY
Payer: MEDICARE

## 2022-03-01 LAB
ALBUMIN SERPL BCP-MCNC: 4.2 G/DL (ref 3.2–4.9)
ALBUMIN/GLOB SERPL: 1.8 G/DL
ALP SERPL-CCNC: 87 U/L (ref 30–99)
ALT SERPL-CCNC: 12 U/L (ref 2–50)
ANION GAP SERPL CALC-SCNC: 9 MMOL/L (ref 7–16)
AST SERPL-CCNC: 17 U/L (ref 12–45)
BASOPHILS # BLD AUTO: 0.9 % (ref 0–1.8)
BASOPHILS # BLD: 0.04 K/UL (ref 0–0.12)
BILIRUB SERPL-MCNC: 0.5 MG/DL (ref 0.1–1.5)
BUN SERPL-MCNC: 19 MG/DL (ref 8–22)
CALCIUM SERPL-MCNC: 9.6 MG/DL (ref 8.5–10.5)
CHLORIDE SERPL-SCNC: 106 MMOL/L (ref 96–112)
CHOLEST SERPL-MCNC: 154 MG/DL (ref 100–199)
CO2 SERPL-SCNC: 26 MMOL/L (ref 20–33)
CREAT SERPL-MCNC: 0.81 MG/DL (ref 0.5–1.4)
EOSINOPHIL # BLD AUTO: 0.23 K/UL (ref 0–0.51)
EOSINOPHIL NFR BLD: 5.3 % (ref 0–6.9)
ERYTHROCYTE [DISTWIDTH] IN BLOOD BY AUTOMATED COUNT: 43 FL (ref 35.9–50)
FASTING STATUS PATIENT QL REPORTED: NORMAL
FASTING STATUS PATIENT QL REPORTED: NORMAL
GLOBULIN SER CALC-MCNC: 2.4 G/DL (ref 1.9–3.5)
GLUCOSE SERPL-MCNC: 98 MG/DL (ref 65–99)
HCT VFR BLD AUTO: 43.9 % (ref 37–47)
HDLC SERPL-MCNC: 47 MG/DL
HGB BLD-MCNC: 14.9 G/DL (ref 12–16)
IMM GRANULOCYTES # BLD AUTO: 0.01 K/UL (ref 0–0.11)
IMM GRANULOCYTES NFR BLD AUTO: 0.2 % (ref 0–0.9)
LDLC SERPL CALC-MCNC: 82 MG/DL
LYMPHOCYTES # BLD AUTO: 1.6 K/UL (ref 1–4.8)
LYMPHOCYTES NFR BLD: 36.9 % (ref 22–41)
MCH RBC QN AUTO: 32.6 PG (ref 27–33)
MCHC RBC AUTO-ENTMCNC: 33.9 G/DL (ref 33.6–35)
MCV RBC AUTO: 96.1 FL (ref 81.4–97.8)
MONOCYTES # BLD AUTO: 0.46 K/UL (ref 0–0.85)
MONOCYTES NFR BLD AUTO: 10.6 % (ref 0–13.4)
NEUTROPHILS # BLD AUTO: 2 K/UL (ref 2–7.15)
NEUTROPHILS NFR BLD: 46.1 % (ref 44–72)
NRBC # BLD AUTO: 0 K/UL
NRBC BLD-RTO: 0 /100 WBC
PLATELET # BLD AUTO: 172 K/UL (ref 164–446)
PMV BLD AUTO: 10.1 FL (ref 9–12.9)
POTASSIUM SERPL-SCNC: 4.5 MMOL/L (ref 3.6–5.5)
PROT SERPL-MCNC: 6.6 G/DL (ref 6–8.2)
RBC # BLD AUTO: 4.57 M/UL (ref 4.2–5.4)
SODIUM SERPL-SCNC: 141 MMOL/L (ref 135–145)
TRIGL SERPL-MCNC: 124 MG/DL (ref 0–149)
WBC # BLD AUTO: 4.3 K/UL (ref 4.8–10.8)

## 2022-03-01 PROCEDURE — 80061 LIPID PANEL: CPT

## 2022-03-01 PROCEDURE — 80053 COMPREHEN METABOLIC PANEL: CPT

## 2022-03-01 PROCEDURE — 36415 COLL VENOUS BLD VENIPUNCTURE: CPT

## 2022-03-01 PROCEDURE — 85025 COMPLETE CBC W/AUTO DIFF WBC: CPT

## 2022-03-09 ENCOUNTER — APPOINTMENT (OUTPATIENT)
Dept: URGENT CARE | Facility: PHYSICIAN GROUP | Age: 77
End: 2022-03-09
Payer: MEDICARE

## 2022-03-09 ENCOUNTER — APPOINTMENT (OUTPATIENT)
Dept: RADIOLOGY | Facility: IMAGING CENTER | Age: 77
End: 2022-03-09
Attending: UROLOGY
Payer: MEDICARE

## 2022-03-09 DIAGNOSIS — C67.9 MALIGNANT NEOPLASM OF URINARY BLADDER, UNSPECIFIED SITE (HCC): ICD-10-CM

## 2022-03-09 PROCEDURE — 71046 X-RAY EXAM CHEST 2 VIEWS: CPT | Mod: TC,FY | Performed by: FAMILY MEDICINE

## 2022-06-27 ENCOUNTER — HOSPITAL ENCOUNTER (EMERGENCY)
Facility: MEDICAL CENTER | Age: 77
End: 2022-06-27
Payer: MEDICARE

## 2022-06-28 PROCEDURE — 700111 HCHG RX REV CODE 636 W/ 250 OVERRIDE (IP)

## 2022-06-28 PROCEDURE — 99285 EMERGENCY DEPT VISIT HI MDM: CPT

## 2022-06-28 PROCEDURE — 81001 URINALYSIS AUTO W/SCOPE: CPT

## 2022-06-28 PROCEDURE — 96372 THER/PROPH/DIAG INJ SC/IM: CPT

## 2022-06-28 PROCEDURE — 36415 COLL VENOUS BLD VENIPUNCTURE: CPT

## 2022-06-28 RX ORDER — KETOROLAC TROMETHAMINE 30 MG/ML
INJECTION, SOLUTION INTRAMUSCULAR; INTRAVENOUS
Status: COMPLETED
Start: 2022-06-28 | End: 2022-06-28

## 2022-06-28 RX ORDER — KETOROLAC TROMETHAMINE 30 MG/ML
15 INJECTION, SOLUTION INTRAMUSCULAR; INTRAVENOUS ONCE
Status: COMPLETED | OUTPATIENT
Start: 2022-06-28 | End: 2022-06-28

## 2022-06-28 RX ADMIN — KETOROLAC TROMETHAMINE 15 MG: 30 INJECTION, SOLUTION INTRAMUSCULAR; INTRAVENOUS at 22:48

## 2022-06-28 ASSESSMENT — FIBROSIS 4 INDEX: FIB4 SCORE: 2.2

## 2022-06-29 ENCOUNTER — HOSPITAL ENCOUNTER (EMERGENCY)
Facility: MEDICAL CENTER | Age: 77
End: 2022-06-29
Attending: EMERGENCY MEDICINE
Payer: MEDICARE

## 2022-06-29 ENCOUNTER — APPOINTMENT (OUTPATIENT)
Dept: RADIOLOGY | Facility: MEDICAL CENTER | Age: 77
End: 2022-06-29
Attending: EMERGENCY MEDICINE
Payer: MEDICARE

## 2022-06-29 VITALS
OXYGEN SATURATION: 97 % | BODY MASS INDEX: 29.66 KG/M2 | SYSTOLIC BLOOD PRESSURE: 168 MMHG | TEMPERATURE: 97.1 F | WEIGHT: 161.16 LBS | DIASTOLIC BLOOD PRESSURE: 80 MMHG | RESPIRATION RATE: 16 BRPM | HEART RATE: 81 BPM | HEIGHT: 62 IN

## 2022-06-29 DIAGNOSIS — R10.9 LEFT FLANK PAIN: ICD-10-CM

## 2022-06-29 DIAGNOSIS — R82.71 BACTERIA IN URINE: ICD-10-CM

## 2022-06-29 DIAGNOSIS — N13.1 HYDRONEPHROSIS DUE TO OBSTRUCTION OF URETER: ICD-10-CM

## 2022-06-29 DIAGNOSIS — R31.29 OTHER MICROSCOPIC HEMATURIA: ICD-10-CM

## 2022-06-29 LAB
ALBUMIN SERPL BCP-MCNC: 3.8 G/DL (ref 3.2–4.9)
ALBUMIN/GLOB SERPL: 1.4 G/DL
ALP SERPL-CCNC: 85 U/L (ref 30–99)
ALT SERPL-CCNC: 10 U/L (ref 2–50)
ANION GAP SERPL CALC-SCNC: 11 MMOL/L (ref 7–16)
APPEARANCE UR: CLEAR
AST SERPL-CCNC: 15 U/L (ref 12–45)
BACTERIA #/AREA URNS HPF: ABNORMAL /HPF
BASOPHILS # BLD AUTO: 0.4 % (ref 0–1.8)
BASOPHILS # BLD: 0.04 K/UL (ref 0–0.12)
BILIRUB SERPL-MCNC: 0.4 MG/DL (ref 0.1–1.5)
BILIRUB UR QL STRIP.AUTO: NEGATIVE
BUN SERPL-MCNC: 23 MG/DL (ref 8–22)
CALCIUM SERPL-MCNC: 9.2 MG/DL (ref 8.5–10.5)
CHLORIDE SERPL-SCNC: 106 MMOL/L (ref 96–112)
CO2 SERPL-SCNC: 20 MMOL/L (ref 20–33)
COLOR UR: YELLOW
CREAT SERPL-MCNC: 1.16 MG/DL (ref 0.5–1.4)
EOSINOPHIL # BLD AUTO: 0.11 K/UL (ref 0–0.51)
EOSINOPHIL NFR BLD: 1 % (ref 0–6.9)
EPI CELLS #/AREA URNS HPF: ABNORMAL /HPF
ERYTHROCYTE [DISTWIDTH] IN BLOOD BY AUTOMATED COUNT: 41.8 FL (ref 35.9–50)
GFR SERPLBLD CREATININE-BSD FMLA CKD-EPI: 48 ML/MIN/1.73 M 2
GLOBULIN SER CALC-MCNC: 2.7 G/DL (ref 1.9–3.5)
GLUCOSE SERPL-MCNC: 138 MG/DL (ref 65–99)
GLUCOSE UR STRIP.AUTO-MCNC: NEGATIVE MG/DL
HCT VFR BLD AUTO: 40.2 % (ref 37–47)
HGB BLD-MCNC: 13.9 G/DL (ref 12–16)
HYALINE CASTS #/AREA URNS LPF: ABNORMAL /LPF
IMM GRANULOCYTES # BLD AUTO: 0.07 K/UL (ref 0–0.11)
IMM GRANULOCYTES NFR BLD AUTO: 0.6 % (ref 0–0.9)
KETONES UR STRIP.AUTO-MCNC: NEGATIVE MG/DL
LEUKOCYTE ESTERASE UR QL STRIP.AUTO: ABNORMAL
LIPASE SERPL-CCNC: 42 U/L (ref 11–82)
LYMPHOCYTES # BLD AUTO: 0.84 K/UL (ref 1–4.8)
LYMPHOCYTES NFR BLD: 7.4 % (ref 22–41)
MCH RBC QN AUTO: 32.4 PG (ref 27–33)
MCHC RBC AUTO-ENTMCNC: 34.6 G/DL (ref 33.6–35)
MCV RBC AUTO: 93.7 FL (ref 81.4–97.8)
MICRO URNS: ABNORMAL
MONOCYTES # BLD AUTO: 0.81 K/UL (ref 0–0.85)
MONOCYTES NFR BLD AUTO: 7.1 % (ref 0–13.4)
NEUTROPHILS # BLD AUTO: 9.54 K/UL (ref 2–7.15)
NEUTROPHILS NFR BLD: 83.5 % (ref 44–72)
NITRITE UR QL STRIP.AUTO: POSITIVE
NRBC # BLD AUTO: 0 K/UL
NRBC BLD-RTO: 0 /100 WBC
PH UR STRIP.AUTO: 7 [PH] (ref 5–8)
PLATELET # BLD AUTO: 139 K/UL (ref 164–446)
PMV BLD AUTO: 9.2 FL (ref 9–12.9)
POTASSIUM SERPL-SCNC: 3.8 MMOL/L (ref 3.6–5.5)
PROT SERPL-MCNC: 6.5 G/DL (ref 6–8.2)
PROT UR QL STRIP: 30 MG/DL
RBC # BLD AUTO: 4.29 M/UL (ref 4.2–5.4)
RBC # URNS HPF: ABNORMAL /HPF
RBC UR QL AUTO: ABNORMAL
RENAL EPI CELLS #/AREA URNS HPF: ABNORMAL /HPF
SODIUM SERPL-SCNC: 137 MMOL/L (ref 135–145)
SP GR UR STRIP.AUTO: 1.01
UROBILINOGEN UR STRIP.AUTO-MCNC: 0.2 MG/DL
WBC # BLD AUTO: 11.4 K/UL (ref 4.8–10.8)
WBC #/AREA URNS HPF: ABNORMAL /HPF

## 2022-06-29 PROCEDURE — 96376 TX/PRO/DX INJ SAME DRUG ADON: CPT

## 2022-06-29 PROCEDURE — 700105 HCHG RX REV CODE 258: Performed by: EMERGENCY MEDICINE

## 2022-06-29 PROCEDURE — 700111 HCHG RX REV CODE 636 W/ 250 OVERRIDE (IP): Performed by: EMERGENCY MEDICINE

## 2022-06-29 PROCEDURE — 96375 TX/PRO/DX INJ NEW DRUG ADDON: CPT

## 2022-06-29 PROCEDURE — 85025 COMPLETE CBC W/AUTO DIFF WBC: CPT

## 2022-06-29 PROCEDURE — 83690 ASSAY OF LIPASE: CPT

## 2022-06-29 PROCEDURE — 80053 COMPREHEN METABOLIC PANEL: CPT

## 2022-06-29 PROCEDURE — 96374 THER/PROPH/DIAG INJ IV PUSH: CPT

## 2022-06-29 PROCEDURE — 74176 CT ABD & PELVIS W/O CONTRAST: CPT | Mod: MG

## 2022-06-29 RX ORDER — MORPHINE SULFATE 4 MG/ML
4 INJECTION INTRAVENOUS ONCE
Status: COMPLETED | OUTPATIENT
Start: 2022-06-29 | End: 2022-06-29

## 2022-06-29 RX ORDER — ONDANSETRON 2 MG/ML
4 INJECTION INTRAMUSCULAR; INTRAVENOUS ONCE
Status: COMPLETED | OUTPATIENT
Start: 2022-06-29 | End: 2022-06-29

## 2022-06-29 RX ORDER — SODIUM CHLORIDE 9 MG/ML
1000 INJECTION, SOLUTION INTRAVENOUS ONCE
Status: COMPLETED | OUTPATIENT
Start: 2022-06-29 | End: 2022-06-29

## 2022-06-29 RX ORDER — HYDROCODONE BITARTRATE AND ACETAMINOPHEN 5; 325 MG/1; MG/1
1 TABLET ORAL EVERY 8 HOURS PRN
Qty: 9 TABLET | Refills: 0 | Status: SHIPPED | OUTPATIENT
Start: 2022-06-29 | End: 2022-07-02

## 2022-06-29 RX ORDER — CEFTRIAXONE 1 G/1
1 INJECTION, POWDER, FOR SOLUTION INTRAMUSCULAR; INTRAVENOUS ONCE
Status: COMPLETED | OUTPATIENT
Start: 2022-06-29 | End: 2022-06-29

## 2022-06-29 RX ORDER — CEFDINIR 300 MG/1
300 CAPSULE ORAL 2 TIMES DAILY
Qty: 14 CAPSULE | Refills: 0 | Status: SHIPPED | OUTPATIENT
Start: 2022-06-29 | End: 2022-07-06

## 2022-06-29 RX ADMIN — MORPHINE SULFATE 4 MG: 4 INJECTION INTRAVENOUS at 01:14

## 2022-06-29 RX ADMIN — SODIUM CHLORIDE 1000 ML: 9 INJECTION, SOLUTION INTRAVENOUS at 01:34

## 2022-06-29 RX ADMIN — MORPHINE SULFATE 4 MG: 4 INJECTION INTRAVENOUS at 03:11

## 2022-06-29 RX ADMIN — ONDANSETRON 4 MG: 2 INJECTION INTRAMUSCULAR; INTRAVENOUS at 01:11

## 2022-06-29 RX ADMIN — CEFTRIAXONE SODIUM 1 G: 1 INJECTION, POWDER, FOR SOLUTION INTRAMUSCULAR; INTRAVENOUS at 01:32

## 2022-06-29 ASSESSMENT — PAIN DESCRIPTION - PAIN TYPE
TYPE: ACUTE PAIN
TYPE: ACUTE PAIN

## 2022-06-29 NOTE — ED NOTES
Patient and family member came up wanting to know why people that came after are going back before her. I explained triage process again. Apologized for wait times

## 2022-06-29 NOTE — ED NOTES
"Family member came to desk asking \"How much longer will it be, she is in so much pain.\" Apologized for wait times and explained triage process.   "

## 2022-06-29 NOTE — ED TRIAGE NOTES
"  Chief Complaint   Patient presents with   • Flank Pain   • Abdominal Pain     Pt to triage for above complaint. Pt reports left lower side abd pain and flank pain with nausea. Pt states she was diagnosed with kidney stone and UTI at [place] on Sunday and discharged on Macrobid. Pain has returned today. Pt has urostomy w. Hx bladder cancer. Pt medicated for pain in triage. UA obtained.     Pt is alert/oriented, following commands, and answering questions appropriately. Pt placed in lobby and educated on triage process. Pt encouraged to alert staff for any changes in condition.    .BP (!) 184/79   Pulse 80   Temp 36.1 °C (97 °F) (Temporal)   Resp 18   Ht 1.575 m (5' 2\")   Wt 73.1 kg (161 lb 2.5 oz)   SpO2 94%   BMI 29.48 kg/m²     "

## 2022-06-29 NOTE — ED PROVIDER NOTES
ED Provider Note    CHIEF COMPLAINT  Chief Complaint   Patient presents with   • Flank Pain   • Abdominal Pain     HPI  Patient is a 77-year-old female with a past medical history of bladder cancer status post neoadjuvant chemotherapy, anterior pelvic resection on chemotherapy with current urostomy who presents to the  emergency room for concerns regarding worsening left side discomfort and abdominal pain.  Patient has been having some pain for some time and went to [facility] on Sunday where she was told that she had a kidney stone and urinary tract infection.  Attempted to transfer her here however there was an unsuccessful transfer because of possible capacity and the patient sat in their emergency department with ongoing pain for prolonged period of time.  After receiving doses of tramadol she was feeling improved and wished to be discharged at which point she was started on Macrobid.  Since then she has had ongoing subjective fevers and chills, worsening pain and discomfort.    Pt managed by Dr. Ritter, Dr. Correa is with Oncology    During the course of my examination, the patient in addition to her abdominal discomfort starts having persistent nausea and several episodes of nonbilious nonbloody emesis.  She also endorses looser stools [facility].     PPE Note: I personally donned full PPE for all patient encounters during this visit, including being clean-shaven with an N95 respirator mask, gloves, and goggles.     REVIEW OF SYSTEMS  See HPI for further details. All other systems are negative.     PAST MEDICAL HISTORY   has a past medical history of Arthritis, Blood clotting disorder (HCC) (08/2018), Cancer (HCC) (08/2018), Cancer (HCC) (1980), Dental disorder, High cholesterol, Hyperlipemia, Hypertension, Renal disorder, Urinary bladder disorder, and Urinary incontinence.    SOCIAL HISTORY  Social History     Tobacco Use   • Smoking status: Former Smoker      "Packs/day: 1.00     Years: 15.00     Pack years: 15.00     Types: Cigarettes     Quit date: 1983     Years since quittin.5   • Smokeless tobacco: Never Used   Vaping Use   • Vaping Use: Never used   Substance and Sexual Activity   • Alcohol use: No   • Drug use: No   • Sexual activity: Not on file     SURGICAL HISTORY   has a past surgical history that includes other (2018); other; tonsillectomy; pelvic exenteration (3/7/2019); lymphadenectomy (Bilateral, 3/7/2019); urethrectomy (3/7/2019); and ileo loop diversion (3/7/2019).    CURRENT MEDICATIONS  Home Medications     Reviewed by Laura Cali R.N. (Registered Nurse) on 22 at 2236  Med List Status: Not Addressed   Medication Last Dose Status   acetaminophen (TYLENOL) 325 MG Tab  Active   atorvastatin (LIPITOR) 20 MG Tab  Active   carvedilol (COREG) 12.5 MG Tab  Active   cyanocobalamin (VITAMIN B12) 1000 MCG Tab  Active   docusate sodium (COLACE) 100 MG Cap  Active   lactobacillus rhamnosus (CULTURELLE) Cap capsule  Active   tramadol (ULTRAM) 50 MG Tab  Active              ALLERGIES  No Known Allergies    PHYSICAL EXAM  VITAL SIGNS: BP (!) 184/79   Pulse 80   Temp 36.1 °C (97 °F) (Temporal)   Resp 18   Ht 1.575 m (5' 2\")   Wt 73.1 kg (161 lb 2.5 oz)   SpO2 94%   BMI 29.48 kg/m²   Pulse ox interpretation: I interpret this pulse ox as normal.  Genl: F sitting in gurney uncomfortably, speaking clearly, appears in mild distress   Head: NC/AT   ENT: Mucous membranes slightly dry, posterior pharynx clear, uvula midline, nares patent bilaterally  Eyes: Normal sclera, pupils equal round reactive to light  Neck: Supple, FROM, no LAD appreciated  Pulmonary: Lungs are clear to auscultation bilaterally  Chest: No TTP  CV:  RRR, no murmur appreciated, pulses 2+ in both upper and lower extremities,  Abdomen: soft, left-sided flank discomfort, no true localization or point tenderness.  Right midline urostomy is in place.  ND; no rebound/guarding, no " masses palpated, no HSM  : No true CVA tenderness  Musculoskeletal: Pain free ROM of the neck. Moving upper and lower extremities and spontaneous in coordinated fashion  Neuro: A&Ox4 (person, place, time, situation), speech fluent, gait steady, no focal deficits appreciated  Skin: No rash or lesions.  No pallor or jaundice.  No cyanosis.  Warm and dry.     DIAGNOSTIC STUDIES / PROCEDURES    LABS  Labs Reviewed   CBC WITH DIFFERENTIAL - Abnormal; Notable for the following components:       Result Value    WBC 11.4 (*)     Platelet Count 139 (*)     Neutrophils-Polys 83.50 (*)     Lymphocytes 7.40 (*)     Neutrophils (Absolute) 9.54 (*)     Lymphs (Absolute) 0.84 (*)     All other components within normal limits   COMP METABOLIC PANEL - Abnormal; Notable for the following components:    Glucose 138 (*)     Bun 23 (*)     All other components within normal limits   URINALYSIS - Abnormal; Notable for the following components:    Protein 30 (*)     Nitrite Positive (*)     Leukocyte Esterase Moderate (*)     Occult Blood Moderate (*)     All other components within normal limits   URINE MICROSCOPIC (W/UA) - Abnormal; Notable for the following components:    WBC 20-50 (*)     RBC 5-10 (*)     Bacteria Moderate (*)     Hyaline Cast 3-5 (*)     All other components within normal limits   ESTIMATED GFR - Abnormal; Notable for the following components:    GFR (CKD-EPI) 48 (*)     All other components within normal limits   LIPASE     RADIOLOGY  CT-RENAL COLIC EVALUATION(A/P W/O)   Final Result         1.  Bilateral nephrolithiasis. Bilateral hydronephrosis and hydroureter with urinary diverting procedure changes with new hazy left perinephric fat stranding suggesting changes of obstruction or urinary tract infection.   2.  2.5 cm fat-containing umbilical hernia   3.  Diverticulosis   4.  Atherosclerosis        COURSE & MEDICAL DECISION MAKING  Pertinent Labs & Imaging studies reviewed. (See chart for details)    DDX: Renal  colic, infected stone, cystitis, colitis, diverticulitis    MDM    Initial evaluation at 1237:  Patient seen and evaluated for symptoms as described above.  The patient presents with worsening pain and discomfort after having a diagnosis of a urinary tract infection and concurrent 2 to 3 mm urinary tract stone.  She is not on active chemotherapy nor she on any other immune modulating medicines at this time.  She has had subjective fevers and chills with vital signs that are reassuring on initial assessment.  She has hypertension though she has not taking her oral antihypertensive medications prior to arrival.  She continues to have flank discomfort and has been on Macrobid which I believe is a inadequate antibiotic for her current state.  IV access established, fluid resuscitation given as the patient will need further fluids for flushing of presumed infected stone.  Rocephin is given for empiric coverage and CT renal colic study is obtained to rule out any possible obstruction.  Initial urinalysis obtained in triage shows 20-50 white blood cells, 10 red cells, moderate bacteria with nitrites consistent with infection.  There is some elements of chronic contamination due to the patient's ileal conduit.  The patient CT scan obtained shows no evidence of obstructing nephrolithiasis and there are small punctate stones noted.  There is no clear acute obstruction and she has changes consistent with chronic hydroureter and changes surrounding urinary diverting procedure.  There are some nonspecific left perinephric fat stranding that could be seen in cases of obstruction or infection.  I discussed the case with the on-call urologist who believes a lot of these changes are likely secondary to the chronicity and her prior surgical findings.  As there is no acute obstruction on CT and the patient was on Macrobid we would recommend increasing the type of antibiotic that she is on, continuing this for 7 days and following up  in clinic upon discharge.  Patient feels comfortable doing this at this time, pain is controlled, no further vomiting and she feels comfortable going home with daughter at the bedside.  Questions are addressed discharged home in stable condition.    HYDRATION: Based on the patient's presentation of Acute Vomiting and Dehydration the patient was given IV fluids. IV Hydration was used because oral hydration was not adequate alone. Upon recheck following hydration, the patient was improved.    The patient will not drink alcohol nor drive with prescribed medications. The patient will return for worsening symptoms and is stable at the time of discharge. The patient verbalizes understanding and will comply.    NARCOTICS:  In prescribing controlled substances to this patient, I certify that I have obtained and reviewed the medical history of Charlene Vincent. I have also made a good royer effort to obtain applicable records from other providers who have treated the patient and records did not demonstrate any increased risk of substance abuse that would prevent me from prescribing controlled substances.     I have conducted a physical exam and documented it. I have reviewed Ms. Vincent’s prescription history as maintained by the [place] Prescription Monitoring Program.     I have assessed the patient’s risk for abuse, dependency, and addiction using the validated Opioid Risk Tool available at https://www.mdcalc.com/bjctip-qpnu-crvf-ort-narcotic-abuse.     Given the above, I believe the benefits of controlled substance therapy outweigh the risks. The reasons for prescribing controlled substances include non-narcotic, oral analgesic alternatives have been inadequate for pain control. Accordingly, I have discussed the risk and benefits, treatment plan, and alternative therapies with the patient.     FINAL IMPRESSION  Visit Diagnoses     ICD-10-CM   1. Left flank pain  R10.9   2. Other microscopic hematuria  R31.29   3.  Bacteria in urine  R82.71   4. Hydronephrosis due to obstruction of ureter  N13.1     Electronically signed by: Dereck Sanderson M.D., 6/29/2022 12:37 AM

## 2022-10-13 ENCOUNTER — PRE-ADMISSION TESTING (OUTPATIENT)
Dept: ADMISSIONS | Facility: MEDICAL CENTER | Age: 77
End: 2022-10-13
Attending: UROLOGY
Payer: MEDICARE

## 2022-10-13 DIAGNOSIS — Z01.810 PRE-OPERATIVE CARDIOVASCULAR EXAMINATION: ICD-10-CM

## 2022-10-13 DIAGNOSIS — Z01.812 PRE-OPERATIVE LABORATORY EXAMINATION: Primary | ICD-10-CM

## 2022-10-13 LAB
ANION GAP SERPL CALC-SCNC: 11 MMOL/L (ref 7–16)
BASOPHILS # BLD AUTO: 0.4 % (ref 0–1.8)
BASOPHILS # BLD: 0.03 K/UL (ref 0–0.12)
BUN SERPL-MCNC: 18 MG/DL (ref 8–22)
CALCIUM SERPL-MCNC: 9.9 MG/DL (ref 8.5–10.5)
CHLORIDE SERPL-SCNC: 104 MMOL/L (ref 96–112)
CO2 SERPL-SCNC: 28 MMOL/L (ref 20–33)
CREAT SERPL-MCNC: 0.92 MG/DL (ref 0.5–1.4)
EOSINOPHIL # BLD AUTO: 0.15 K/UL (ref 0–0.51)
EOSINOPHIL NFR BLD: 2.1 % (ref 0–6.9)
ERYTHROCYTE [DISTWIDTH] IN BLOOD BY AUTOMATED COUNT: 41.5 FL (ref 35.9–50)
GFR SERPLBLD CREATININE-BSD FMLA CKD-EPI: 64 ML/MIN/1.73 M 2
GLUCOSE SERPL-MCNC: 125 MG/DL (ref 65–99)
HCT VFR BLD AUTO: 42.7 % (ref 37–47)
HGB BLD-MCNC: 14.5 G/DL (ref 12–16)
IMM GRANULOCYTES # BLD AUTO: 0.04 K/UL (ref 0–0.11)
IMM GRANULOCYTES NFR BLD AUTO: 0.5 % (ref 0–0.9)
INR PPP: 1.05 (ref 0.87–1.13)
LYMPHOCYTES # BLD AUTO: 1.77 K/UL (ref 1–4.8)
LYMPHOCYTES NFR BLD: 24.2 % (ref 22–41)
MCH RBC QN AUTO: 32.3 PG (ref 27–33)
MCHC RBC AUTO-ENTMCNC: 34 G/DL (ref 33.6–35)
MCV RBC AUTO: 95.1 FL (ref 81.4–97.8)
MONOCYTES # BLD AUTO: 0.72 K/UL (ref 0–0.85)
MONOCYTES NFR BLD AUTO: 9.8 % (ref 0–13.4)
NEUTROPHILS # BLD AUTO: 4.6 K/UL (ref 2–7.15)
NEUTROPHILS NFR BLD: 63 % (ref 44–72)
NRBC # BLD AUTO: 0 K/UL
NRBC BLD-RTO: 0 /100 WBC
PLATELET # BLD AUTO: 262 K/UL (ref 164–446)
PMV BLD AUTO: 9.4 FL (ref 9–12.9)
POTASSIUM SERPL-SCNC: 4.4 MMOL/L (ref 3.6–5.5)
PROTHROMBIN TIME: 13.5 SEC (ref 12–14.6)
RBC # BLD AUTO: 4.49 M/UL (ref 4.2–5.4)
SODIUM SERPL-SCNC: 143 MMOL/L (ref 135–145)
WBC # BLD AUTO: 7.3 K/UL (ref 4.8–10.8)

## 2022-10-13 PROCEDURE — 93005 ELECTROCARDIOGRAM TRACING: CPT

## 2022-10-13 PROCEDURE — 85025 COMPLETE CBC W/AUTO DIFF WBC: CPT

## 2022-10-13 PROCEDURE — 85610 PROTHROMBIN TIME: CPT

## 2022-10-13 PROCEDURE — 36415 COLL VENOUS BLD VENIPUNCTURE: CPT

## 2022-10-13 PROCEDURE — 80048 BASIC METABOLIC PNL TOTAL CA: CPT

## 2022-10-13 ASSESSMENT — FIBROSIS 4 INDEX: FIB4 SCORE: 2.63

## 2022-10-14 ENCOUNTER — HOSPITAL ENCOUNTER (OUTPATIENT)
Facility: MEDICAL CENTER | Age: 77
End: 2022-10-14
Attending: UROLOGY
Payer: MEDICARE

## 2022-10-14 LAB — EKG IMPRESSION: NORMAL

## 2022-10-14 PROCEDURE — 87186 SC STD MICRODIL/AGAR DIL: CPT

## 2022-10-14 PROCEDURE — 87086 URINE CULTURE/COLONY COUNT: CPT

## 2022-10-14 PROCEDURE — 93010 ELECTROCARDIOGRAM REPORT: CPT | Performed by: INTERNAL MEDICINE

## 2022-10-14 PROCEDURE — 87077 CULTURE AEROBIC IDENTIFY: CPT

## 2022-10-14 NOTE — OR NURSING
10/13/22 Office called re UA and Culture as pt has a urostomy tube.  The office stated that they had done both there, and that they would fax results to [facility] when final result available.

## 2022-10-14 NOTE — OR NURSING
I spoke to Ellyn surgery scheduler for [facility] who states they did not get a UA and urine culture. Ellyn states Charlene is going to get this done at her local hospital in [place] and [facility] will send us the results once they received.

## 2022-10-18 LAB
BACTERIA UR CULT: ABNORMAL
BACTERIA UR CULT: ABNORMAL
SIGNIFICANT IND 70042: ABNORMAL
SITE SITE: ABNORMAL
SOURCE SOURCE: ABNORMAL

## 2022-10-19 ENCOUNTER — APPOINTMENT (OUTPATIENT)
Dept: RADIOLOGY | Facility: MEDICAL CENTER | Age: 77
End: 2022-10-19
Attending: UROLOGY
Payer: MEDICARE

## 2022-11-15 ENCOUNTER — PRE-ADMISSION TESTING (OUTPATIENT)
Dept: ADMISSIONS | Facility: MEDICAL CENTER | Age: 77
End: 2022-11-15
Attending: UROLOGY
Payer: MEDICARE

## 2022-11-16 ENCOUNTER — ANESTHESIA EVENT (OUTPATIENT)
Dept: SURGERY | Facility: MEDICAL CENTER | Age: 77
End: 2022-11-16
Payer: MEDICARE

## 2022-11-16 ENCOUNTER — HOSPITAL ENCOUNTER (OUTPATIENT)
Facility: MEDICAL CENTER | Age: 77
End: 2022-11-17
Attending: UROLOGY | Admitting: UROLOGY
Payer: MEDICARE

## 2022-11-16 ENCOUNTER — APPOINTMENT (OUTPATIENT)
Dept: RADIOLOGY | Facility: MEDICAL CENTER | Age: 77
End: 2022-11-16
Attending: UROLOGY
Payer: MEDICARE

## 2022-11-16 ENCOUNTER — ANESTHESIA (OUTPATIENT)
Dept: SURGERY | Facility: MEDICAL CENTER | Age: 77
End: 2022-11-16
Payer: MEDICARE

## 2022-11-16 DIAGNOSIS — N20.0 CALCULUS OF KIDNEY: ICD-10-CM

## 2022-11-16 LAB
ERYTHROCYTE [DISTWIDTH] IN BLOOD BY AUTOMATED COUNT: 42.2 FL (ref 35.9–50)
HCT VFR BLD AUTO: 41.1 % (ref 37–47)
HGB BLD-MCNC: 14.3 G/DL (ref 12–16)
INR PPP: 1.02 (ref 0.87–1.13)
MCH RBC QN AUTO: 33.2 PG (ref 27–33)
MCHC RBC AUTO-ENTMCNC: 34.8 G/DL (ref 33.6–35)
MCV RBC AUTO: 95.4 FL (ref 81.4–97.8)
PLATELET # BLD AUTO: 190 K/UL (ref 164–446)
PMV BLD AUTO: 9.5 FL (ref 9–12.9)
PROTHROMBIN TIME: 13.3 SEC (ref 12–14.6)
RBC # BLD AUTO: 4.31 M/UL (ref 4.2–5.4)
WBC # BLD AUTO: 5.5 K/UL (ref 4.8–10.8)

## 2022-11-16 PROCEDURE — 160009 HCHG ANES TIME/MIN: Performed by: UROLOGY

## 2022-11-16 PROCEDURE — 700111 HCHG RX REV CODE 636 W/ 250 OVERRIDE (IP)

## 2022-11-16 PROCEDURE — 700102 HCHG RX REV CODE 250 W/ 637 OVERRIDE(OP): Performed by: ANESTHESIOLOGY

## 2022-11-16 PROCEDURE — 160046 HCHG PACU - 1ST 60 MINS PHASE II

## 2022-11-16 PROCEDURE — C1729 CATH, DRAINAGE: HCPCS | Performed by: UROLOGY

## 2022-11-16 PROCEDURE — C1769 GUIDE WIRE: HCPCS | Performed by: UROLOGY

## 2022-11-16 PROCEDURE — 700111 HCHG RX REV CODE 636 W/ 250 OVERRIDE (IP): Performed by: UROLOGY

## 2022-11-16 PROCEDURE — 160041 HCHG SURGERY MINUTES - EA ADDL 1 MIN LEVEL 4: Performed by: UROLOGY

## 2022-11-16 PROCEDURE — 160047 HCHG PACU  - EA ADDL 30 MINS PHASE II

## 2022-11-16 PROCEDURE — 00862 ANES XTRPRTL LWR ABD RNL PX: CPT | Performed by: ANESTHESIOLOGY

## 2022-11-16 PROCEDURE — 700111 HCHG RX REV CODE 636 W/ 250 OVERRIDE (IP): Performed by: ANESTHESIOLOGY

## 2022-11-16 PROCEDURE — A9270 NON-COVERED ITEM OR SERVICE: HCPCS | Performed by: RADIOLOGY

## 2022-11-16 PROCEDURE — A9270 NON-COVERED ITEM OR SERVICE: HCPCS | Performed by: UROLOGY

## 2022-11-16 PROCEDURE — 110371 HCHG SHELL REV 272: Performed by: UROLOGY

## 2022-11-16 PROCEDURE — G0378 HOSPITAL OBSERVATION PER HR: HCPCS

## 2022-11-16 PROCEDURE — 99153 MOD SED SAME PHYS/QHP EA: CPT

## 2022-11-16 PROCEDURE — 700102 HCHG RX REV CODE 250 W/ 637 OVERRIDE(OP): Performed by: UROLOGY

## 2022-11-16 PROCEDURE — 160048 HCHG OR STATISTICAL LEVEL 1-5: Performed by: UROLOGY

## 2022-11-16 PROCEDURE — 700101 HCHG RX REV CODE 250: Performed by: ANESTHESIOLOGY

## 2022-11-16 PROCEDURE — 700105 HCHG RX REV CODE 258: Performed by: ANESTHESIOLOGY

## 2022-11-16 PROCEDURE — 85027 COMPLETE CBC AUTOMATED: CPT

## 2022-11-16 PROCEDURE — 160002 HCHG RECOVERY MINUTES (STAT)

## 2022-11-16 PROCEDURE — 700117 HCHG RX CONTRAST REV CODE 255: Performed by: UROLOGY

## 2022-11-16 PROCEDURE — 700117 HCHG RX CONTRAST REV CODE 255: Performed by: RADIOLOGY

## 2022-11-16 PROCEDURE — 700102 HCHG RX REV CODE 250 W/ 637 OVERRIDE(OP): Performed by: RADIOLOGY

## 2022-11-16 PROCEDURE — C1726 CATH, BAL DIL, NON-VASCULAR: HCPCS | Performed by: UROLOGY

## 2022-11-16 PROCEDURE — 99100 ANES PT EXTEME AGE<1 YR&>70: CPT | Performed by: ANESTHESIOLOGY

## 2022-11-16 PROCEDURE — 160036 HCHG PACU - EA ADDL 30 MINS PHASE I

## 2022-11-16 PROCEDURE — 85610 PROTHROMBIN TIME: CPT

## 2022-11-16 PROCEDURE — 700111 HCHG RX REV CODE 636 W/ 250 OVERRIDE (IP): Performed by: RADIOLOGY

## 2022-11-16 PROCEDURE — C1758 CATHETER, URETERAL: HCPCS | Performed by: UROLOGY

## 2022-11-16 PROCEDURE — 96374 THER/PROPH/DIAG INJ IV PUSH: CPT | Mod: XU

## 2022-11-16 PROCEDURE — 160002 HCHG RECOVERY MINUTES (STAT): Performed by: UROLOGY

## 2022-11-16 PROCEDURE — 700105 HCHG RX REV CODE 258: Performed by: RADIOLOGY

## 2022-11-16 PROCEDURE — A9270 NON-COVERED ITEM OR SERVICE: HCPCS | Performed by: ANESTHESIOLOGY

## 2022-11-16 PROCEDURE — 160035 HCHG PACU - 1ST 60 MINS PHASE I: Performed by: UROLOGY

## 2022-11-16 PROCEDURE — 160035 HCHG PACU - 1ST 60 MINS PHASE I

## 2022-11-16 PROCEDURE — 160029 HCHG SURGERY MINUTES - 1ST 30 MINS LEVEL 4: Performed by: UROLOGY

## 2022-11-16 RX ORDER — IODIXANOL 270 MG/ML
30 INJECTION, SOLUTION INTRAVASCULAR ONCE
Status: COMPLETED | OUTPATIENT
Start: 2022-11-16 | End: 2022-11-16

## 2022-11-16 RX ORDER — DEXAMETHASONE SODIUM PHOSPHATE 4 MG/ML
4 INJECTION, SOLUTION INTRA-ARTICULAR; INTRALESIONAL; INTRAMUSCULAR; INTRAVENOUS; SOFT TISSUE
Status: DISCONTINUED | OUTPATIENT
Start: 2022-11-16 | End: 2022-11-17 | Stop reason: HOSPADM

## 2022-11-16 RX ORDER — SODIUM CHLORIDE, SODIUM LACTATE, POTASSIUM CHLORIDE, CALCIUM CHLORIDE 600; 310; 30; 20 MG/100ML; MG/100ML; MG/100ML; MG/100ML
INJECTION, SOLUTION INTRAVENOUS
Status: DISCONTINUED | OUTPATIENT
Start: 2022-11-16 | End: 2022-11-16 | Stop reason: SURG

## 2022-11-16 RX ORDER — CEFAZOLIN SODIUM 1 G/3ML
INJECTION, POWDER, FOR SOLUTION INTRAMUSCULAR; INTRAVENOUS
Status: DISPENSED
Start: 2022-11-16 | End: 2022-11-16

## 2022-11-16 RX ORDER — HYDRALAZINE HYDROCHLORIDE 20 MG/ML
5 INJECTION INTRAMUSCULAR; INTRAVENOUS
Status: DISCONTINUED | OUTPATIENT
Start: 2022-11-16 | End: 2022-11-16 | Stop reason: HOSPADM

## 2022-11-16 RX ORDER — CEFTRIAXONE 1 G/1
1 INJECTION, POWDER, FOR SOLUTION INTRAMUSCULAR; INTRAVENOUS ONCE
Status: COMPLETED | OUTPATIENT
Start: 2022-11-16 | End: 2022-11-16

## 2022-11-16 RX ORDER — ACETAMINOPHEN 325 MG/1
650 TABLET ORAL EVERY 6 HOURS PRN
Status: DISCONTINUED | OUTPATIENT
Start: 2022-11-16 | End: 2022-11-16

## 2022-11-16 RX ORDER — ONDANSETRON 2 MG/ML
4 INJECTION INTRAMUSCULAR; INTRAVENOUS EVERY 4 HOURS PRN
Status: DISCONTINUED | OUTPATIENT
Start: 2022-11-16 | End: 2022-11-17 | Stop reason: HOSPADM

## 2022-11-16 RX ORDER — DIPHENHYDRAMINE HYDROCHLORIDE 50 MG/ML
12.5 INJECTION INTRAMUSCULAR; INTRAVENOUS
Status: DISCONTINUED | OUTPATIENT
Start: 2022-11-16 | End: 2022-11-16 | Stop reason: HOSPADM

## 2022-11-16 RX ORDER — ONDANSETRON 2 MG/ML
4 INJECTION INTRAMUSCULAR; INTRAVENOUS
Status: DISCONTINUED | OUTPATIENT
Start: 2022-11-16 | End: 2022-11-16 | Stop reason: HOSPADM

## 2022-11-16 RX ORDER — LABETALOL HYDROCHLORIDE 5 MG/ML
5 INJECTION, SOLUTION INTRAVENOUS
Status: DISCONTINUED | OUTPATIENT
Start: 2022-11-16 | End: 2022-11-16 | Stop reason: HOSPADM

## 2022-11-16 RX ORDER — ONDANSETRON 2 MG/ML
INJECTION INTRAMUSCULAR; INTRAVENOUS PRN
Status: DISCONTINUED | OUTPATIENT
Start: 2022-11-16 | End: 2022-11-16 | Stop reason: SURG

## 2022-11-16 RX ORDER — MIDAZOLAM HYDROCHLORIDE 1 MG/ML
.5-2 INJECTION INTRAMUSCULAR; INTRAVENOUS PRN
Status: ACTIVE | OUTPATIENT
Start: 2022-11-16 | End: 2022-11-16

## 2022-11-16 RX ORDER — ACETAMINOPHEN 325 MG/1
650 TABLET ORAL EVERY 6 HOURS PRN
Status: DISCONTINUED | OUTPATIENT
Start: 2022-11-21 | End: 2022-11-17 | Stop reason: HOSPADM

## 2022-11-16 RX ORDER — METOCLOPRAMIDE HYDROCHLORIDE 5 MG/ML
INJECTION INTRAMUSCULAR; INTRAVENOUS PRN
Status: DISCONTINUED | OUTPATIENT
Start: 2022-11-16 | End: 2022-11-16 | Stop reason: SURG

## 2022-11-16 RX ORDER — HYDROMORPHONE HYDROCHLORIDE 1 MG/ML
0.25 INJECTION, SOLUTION INTRAMUSCULAR; INTRAVENOUS; SUBCUTANEOUS
Status: DISPENSED | OUTPATIENT
Start: 2022-11-16 | End: 2022-11-17

## 2022-11-16 RX ORDER — ATORVASTATIN CALCIUM 20 MG/1
20 TABLET, FILM COATED ORAL NIGHTLY
Status: DISCONTINUED | OUTPATIENT
Start: 2022-11-16 | End: 2022-11-17 | Stop reason: HOSPADM

## 2022-11-16 RX ORDER — ONDANSETRON 2 MG/ML
4 INJECTION INTRAMUSCULAR; INTRAVENOUS PRN
Status: DISPENSED | OUTPATIENT
Start: 2022-11-16 | End: 2022-11-16

## 2022-11-16 RX ORDER — ACETAMINOPHEN 325 MG/1
650 TABLET ORAL EVERY 6 HOURS
Status: DISCONTINUED | OUTPATIENT
Start: 2022-11-16 | End: 2022-11-17 | Stop reason: HOSPADM

## 2022-11-16 RX ORDER — HYDROMORPHONE HYDROCHLORIDE 1 MG/ML
0.4 INJECTION, SOLUTION INTRAMUSCULAR; INTRAVENOUS; SUBCUTANEOUS
Status: DISCONTINUED | OUTPATIENT
Start: 2022-11-16 | End: 2022-11-16 | Stop reason: HOSPADM

## 2022-11-16 RX ORDER — OXYCODONE HYDROCHLORIDE 5 MG/1
5 TABLET ORAL
Status: DISCONTINUED | OUTPATIENT
Start: 2022-11-16 | End: 2022-11-17 | Stop reason: HOSPADM

## 2022-11-16 RX ORDER — GENTAMICIN SULFATE 40 MG/ML
INJECTION, SOLUTION INTRAMUSCULAR; INTRAVENOUS PRN
Status: DISCONTINUED | OUTPATIENT
Start: 2022-11-16 | End: 2022-11-16 | Stop reason: SURG

## 2022-11-16 RX ORDER — IBUPROFEN 200 MG
800 TABLET ORAL 3 TIMES DAILY PRN
Status: DISCONTINUED | OUTPATIENT
Start: 2022-11-19 | End: 2022-11-17 | Stop reason: HOSPADM

## 2022-11-16 RX ORDER — HYDROMORPHONE HYDROCHLORIDE 1 MG/ML
0.5 INJECTION, SOLUTION INTRAMUSCULAR; INTRAVENOUS; SUBCUTANEOUS
Status: DISCONTINUED | OUTPATIENT
Start: 2022-11-16 | End: 2022-11-17 | Stop reason: HOSPADM

## 2022-11-16 RX ORDER — HALOPERIDOL 5 MG/ML
1 INJECTION INTRAMUSCULAR EVERY 6 HOURS PRN
Status: DISCONTINUED | OUTPATIENT
Start: 2022-11-16 | End: 2022-11-17 | Stop reason: HOSPADM

## 2022-11-16 RX ORDER — KETOROLAC TROMETHAMINE 30 MG/ML
15 INJECTION, SOLUTION INTRAMUSCULAR; INTRAVENOUS EVERY 6 HOURS
Status: DISCONTINUED | OUTPATIENT
Start: 2022-11-16 | End: 2022-11-17 | Stop reason: HOSPADM

## 2022-11-16 RX ORDER — SCOLOPAMINE TRANSDERMAL SYSTEM 1 MG/1
1 PATCH, EXTENDED RELEASE TRANSDERMAL
Status: DISCONTINUED | OUTPATIENT
Start: 2022-11-16 | End: 2022-11-17 | Stop reason: HOSPADM

## 2022-11-16 RX ORDER — LIDOCAINE HYDROCHLORIDE 20 MG/ML
INJECTION, SOLUTION EPIDURAL; INFILTRATION; INTRACAUDAL; PERINEURAL PRN
Status: DISCONTINUED | OUTPATIENT
Start: 2022-11-16 | End: 2022-11-16 | Stop reason: SURG

## 2022-11-16 RX ORDER — HYDROMORPHONE HYDROCHLORIDE 1 MG/ML
0.2 INJECTION, SOLUTION INTRAMUSCULAR; INTRAVENOUS; SUBCUTANEOUS
Status: DISCONTINUED | OUTPATIENT
Start: 2022-11-16 | End: 2022-11-16 | Stop reason: HOSPADM

## 2022-11-16 RX ORDER — OXYCODONE HYDROCHLORIDE 5 MG/1
2.5 TABLET ORAL
Status: DISCONTINUED | OUTPATIENT
Start: 2022-11-16 | End: 2022-11-16

## 2022-11-16 RX ORDER — ONDANSETRON 2 MG/ML
4 INJECTION INTRAMUSCULAR; INTRAVENOUS EVERY 8 HOURS PRN
Status: ACTIVE | OUTPATIENT
Start: 2022-11-16 | End: 2022-11-17

## 2022-11-16 RX ORDER — DEXAMETHASONE SODIUM PHOSPHATE 4 MG/ML
INJECTION, SOLUTION INTRA-ARTICULAR; INTRALESIONAL; INTRAMUSCULAR; INTRAVENOUS; SOFT TISSUE PRN
Status: DISCONTINUED | OUTPATIENT
Start: 2022-11-16 | End: 2022-11-16 | Stop reason: SURG

## 2022-11-16 RX ORDER — DIPHENHYDRAMINE HYDROCHLORIDE 50 MG/ML
25 INJECTION INTRAMUSCULAR; INTRAVENOUS EVERY 6 HOURS PRN
Status: DISCONTINUED | OUTPATIENT
Start: 2022-11-16 | End: 2022-11-17 | Stop reason: HOSPADM

## 2022-11-16 RX ORDER — MIDAZOLAM HYDROCHLORIDE 1 MG/ML
INJECTION INTRAMUSCULAR; INTRAVENOUS
Status: COMPLETED
Start: 2022-11-16 | End: 2022-11-16

## 2022-11-16 RX ORDER — SODIUM CHLORIDE, SODIUM LACTATE, POTASSIUM CHLORIDE, CALCIUM CHLORIDE 600; 310; 30; 20 MG/100ML; MG/100ML; MG/100ML; MG/100ML
INJECTION, SOLUTION INTRAVENOUS CONTINUOUS
Status: ACTIVE | OUTPATIENT
Start: 2022-11-16 | End: 2022-11-16

## 2022-11-16 RX ORDER — HYDROMORPHONE HYDROCHLORIDE 1 MG/ML
0.1 INJECTION, SOLUTION INTRAMUSCULAR; INTRAVENOUS; SUBCUTANEOUS
Status: DISCONTINUED | OUTPATIENT
Start: 2022-11-16 | End: 2022-11-16 | Stop reason: HOSPADM

## 2022-11-16 RX ORDER — CARVEDILOL 12.5 MG/1
12.5 TABLET ORAL 2 TIMES DAILY WITH MEALS
Status: DISCONTINUED | OUTPATIENT
Start: 2022-11-16 | End: 2022-11-17 | Stop reason: HOSPADM

## 2022-11-16 RX ORDER — DOCUSATE SODIUM 100 MG/1
100 CAPSULE, LIQUID FILLED ORAL 2 TIMES DAILY
Status: DISCONTINUED | OUTPATIENT
Start: 2022-11-16 | End: 2022-11-17 | Stop reason: HOSPADM

## 2022-11-16 RX ORDER — OXYCODONE HYDROCHLORIDE 5 MG/1
10 TABLET ORAL
Status: DISCONTINUED | OUTPATIENT
Start: 2022-11-16 | End: 2022-11-17 | Stop reason: HOSPADM

## 2022-11-16 RX ORDER — DEXTROSE MONOHYDRATE, SODIUM CHLORIDE, AND POTASSIUM CHLORIDE 50; 1.49; 4.5 G/1000ML; G/1000ML; G/1000ML
INJECTION, SOLUTION INTRAVENOUS CONTINUOUS
Status: DISPENSED | OUTPATIENT
Start: 2022-11-16 | End: 2022-11-16

## 2022-11-16 RX ORDER — SODIUM CHLORIDE 9 MG/ML
500 INJECTION, SOLUTION INTRAVENOUS
Status: ACTIVE | OUTPATIENT
Start: 2022-11-16 | End: 2022-11-16

## 2022-11-16 RX ORDER — ROCURONIUM BROMIDE 10 MG/ML
INJECTION, SOLUTION INTRAVENOUS PRN
Status: DISCONTINUED | OUTPATIENT
Start: 2022-11-16 | End: 2022-11-16 | Stop reason: SURG

## 2022-11-16 RX ADMIN — CARVEDILOL 12.5 MG: 12.5 TABLET, FILM COATED ORAL at 21:57

## 2022-11-16 RX ADMIN — IODIXANOL 30 ML: 270 INJECTION, SOLUTION INTRAVASCULAR at 09:25

## 2022-11-16 RX ADMIN — ONDANSETRON 4 MG: 2 INJECTION INTRAMUSCULAR; INTRAVENOUS at 11:24

## 2022-11-16 RX ADMIN — DEXAMETHASONE SODIUM PHOSPHATE 4 MG: 4 INJECTION, SOLUTION INTRA-ARTICULAR; INTRALESIONAL; INTRAMUSCULAR; INTRAVENOUS; SOFT TISSUE at 13:18

## 2022-11-16 RX ADMIN — SUGAMMADEX 200 MG: 100 INJECTION, SOLUTION INTRAVENOUS at 14:31

## 2022-11-16 RX ADMIN — ONDANSETRON 4 MG: 2 INJECTION INTRAMUSCULAR; INTRAVENOUS at 14:28

## 2022-11-16 RX ADMIN — OXYCODONE 5 MG: 5 TABLET ORAL at 22:08

## 2022-11-16 RX ADMIN — FENTANYL CITRATE 50 MCG: 50 INJECTION, SOLUTION INTRAMUSCULAR; INTRAVENOUS at 09:01

## 2022-11-16 RX ADMIN — MIDAZOLAM HYDROCHLORIDE 2 MG: 1 INJECTION INTRAMUSCULAR; INTRAVENOUS at 08:55

## 2022-11-16 RX ADMIN — LIDOCAINE HYDROCHLORIDE 60 MG: 20 INJECTION, SOLUTION EPIDURAL; INFILTRATION; INTRACAUDAL at 13:11

## 2022-11-16 RX ADMIN — ONDANSETRON 4 MG: 2 INJECTION INTRAMUSCULAR; INTRAVENOUS at 21:56

## 2022-11-16 RX ADMIN — SODIUM CHLORIDE, POTASSIUM CHLORIDE, SODIUM LACTATE AND CALCIUM CHLORIDE: 600; 310; 30; 20 INJECTION, SOLUTION INTRAVENOUS at 13:05

## 2022-11-16 RX ADMIN — HYDROCODONE BITARTRATE AND ACETAMINOPHEN 15 MG: 7.5; 325 SOLUTION ORAL at 18:40

## 2022-11-16 RX ADMIN — METOCLOPRAMIDE 10 MG: 5 INJECTION, SOLUTION INTRAMUSCULAR; INTRAVENOUS at 13:07

## 2022-11-16 RX ADMIN — CEFTRIAXONE SODIUM 1 G: 1 INJECTION, POWDER, FOR SOLUTION INTRAMUSCULAR; INTRAVENOUS at 13:26

## 2022-11-16 RX ADMIN — OXYCODONE 2.5 MG: 5 TABLET ORAL at 09:55

## 2022-11-16 RX ADMIN — SODIUM CHLORIDE 2 G: 900 INJECTION INTRAVENOUS at 08:43

## 2022-11-16 RX ADMIN — PROPOFOL 150 MG: 10 INJECTION, EMULSION INTRAVENOUS at 13:11

## 2022-11-16 RX ADMIN — GENTAMICIN SULFATE 80 MG: 40 INJECTION, SOLUTION INTRAMUSCULAR; INTRAVENOUS at 13:26

## 2022-11-16 RX ADMIN — FENTANYL CITRATE 50 MCG: 50 INJECTION, SOLUTION INTRAMUSCULAR; INTRAVENOUS at 13:39

## 2022-11-16 RX ADMIN — FENTANYL CITRATE 50 MCG: 50 INJECTION, SOLUTION INTRAMUSCULAR; INTRAVENOUS at 13:05

## 2022-11-16 RX ADMIN — FENTANYL CITRATE 50 MCG: 50 INJECTION, SOLUTION INTRAMUSCULAR; INTRAVENOUS at 08:55

## 2022-11-16 RX ADMIN — HYDROMORPHONE HYDROCHLORIDE 0.25 MG: 1 INJECTION, SOLUTION INTRAMUSCULAR; INTRAVENOUS; SUBCUTANEOUS at 10:30

## 2022-11-16 RX ADMIN — ROCURONIUM BROMIDE 50 MG: 10 INJECTION, SOLUTION INTRAVENOUS at 13:11

## 2022-11-16 RX ADMIN — ROCURONIUM BROMIDE 20 MG: 10 INJECTION, SOLUTION INTRAVENOUS at 13:36

## 2022-11-16 RX ADMIN — MIDAZOLAM HYDROCHLORIDE 2 MG: 1 INJECTION, SOLUTION INTRAMUSCULAR; INTRAVENOUS at 08:55

## 2022-11-16 RX ADMIN — ATORVASTATIN CALCIUM 20 MG: 20 TABLET, FILM COATED ORAL at 21:56

## 2022-11-16 ASSESSMENT — LIFESTYLE VARIABLES
HAVE PEOPLE ANNOYED YOU BY CRITICIZING YOUR DRINKING: NO
CONSUMPTION TOTAL: NEGATIVE
EVER FELT BAD OR GUILTY ABOUT YOUR DRINKING: NO
ALCOHOL_USE: NO
HAVE YOU EVER FELT YOU SHOULD CUT DOWN ON YOUR DRINKING: NO
AVERAGE NUMBER OF DAYS PER WEEK YOU HAVE A DRINK CONTAINING ALCOHOL: 0
HOW MANY TIMES IN THE PAST YEAR HAVE YOU HAD 5 OR MORE DRINKS IN A DAY: 0
EVER HAD A DRINK FIRST THING IN THE MORNING TO STEADY YOUR NERVES TO GET RID OF A HANGOVER: NO
TOTAL SCORE: 0
ON A TYPICAL DAY WHEN YOU DRINK ALCOHOL HOW MANY DRINKS DO YOU HAVE: 0

## 2022-11-16 ASSESSMENT — PAIN SCALES - PAIN ASSESSMENT IN ADVANCED DEMENTIA (PAINAD)
CONSOLABILITY: NO NEED TO CONSOLE
BODYLANGUAGE: RELAXED
BREATHING: NORMAL
FACIALEXPRESSION: SMILING OR INEXPRESSIVE
TOTALSCORE: 0

## 2022-11-16 ASSESSMENT — PAIN DESCRIPTION - PAIN TYPE
TYPE: SURGICAL PAIN
TYPE: ACUTE PAIN

## 2022-11-16 ASSESSMENT — PATIENT HEALTH QUESTIONNAIRE - PHQ9
2. FEELING DOWN, DEPRESSED, IRRITABLE, OR HOPELESS: NOT AT ALL
SUM OF ALL RESPONSES TO PHQ9 QUESTIONS 1 AND 2: 0
1. LITTLE INTEREST OR PLEASURE IN DOING THINGS: NOT AT ALL

## 2022-11-16 ASSESSMENT — FIBROSIS 4 INDEX
FIB4 SCORE: 1.92
FIB4 SCORE: 1.92

## 2022-11-16 ASSESSMENT — PAIN SCALES - GENERAL: PAIN_LEVEL: 0

## 2022-11-16 NOTE — ANESTHESIA TIME REPORT
Anesthesia Start and Stop Event Times     Date Time Event    11/16/2022 1248 Ready for Procedure     1305 Anesthesia Start     1449 Anesthesia Stop        Responsible Staff  11/16/22    Name Role Begin End    Natalia Prince M.D. Anesth 1304 1448        Overtime Reason:  per patricia, locums, etc.    Comments:

## 2022-11-16 NOTE — OR NURSING
Pt to Phase II for holding until next procedure. VSS on monitor. Medicated for nausea. Handoff given.

## 2022-11-16 NOTE — OR SURGEON
"Immediate Post- Operative Note        Findings: LEFT PERC NEPHRO-URETERAL CATHETER VIA LOWER POLE ACCESS. 4F KUMPE CATHETER WITH 0.038\" LUMEN. CATHETER IN URINARY DIVERSION POUCH. MODERATE LEFT HYDRO AND HYDROURETER      Procedure(s):  LEFT PERC NEPHRO-URETERAL CATHETER VIA LOWER POLE ACCESS. 4F KUMPE CATHETER WITH 0.038\" LUMEN. CATHETER IN URINARY DIVERSION POUCH    CATHETER CAPPED     NEPHROLITHOTOMY OR OTHER UROLOGY PROCEDURE TO FOLLOW.       Estimated Blood Loss: Less than 5 ml        Complications: None            11/16/2022     9:28 AM     Anthony Verdugo M.D.     "
Immediate Post OP Note    PreOp Diagnosis: Left nephrolithiasis      PostOp Diagnosis: As above      Procedure(s):  LEFT PERCUTANEOUS TRACT DILATION  LEFT PERCUTANEOUS NEPHROSCOPY  ANTEGRADE LEFT URETEROSCOPY WITH ATTEMPTED STONE BASKETING  LEFT ANTEGRADE NEPHROSTOGRAM - Wound Class: Clean Contaminated    Surgeon(s):  Jaquan Ritter M.D.    Anesthesiologist/Type of Anesthesia:  Anesthesiologist: Natalia Prince M.D./General ETT    Surgical Staff:  Circulator: Enid Omalley R.N.; Jodi Lucio R.N.  Scrub Person: Dileep Silva  Radiology Technologist: Aguilar Duenas    Specimens removed if any:  None    Estimated Blood Loss: 75ml    Findings: Stone fragments in the left ureter.Likely fragmented during the percutaneous nephroureteral catheter placement    Complications: None  Drains: 22 [place] catheter in the left kidney        11/16/2022 2:48 PM Jaquan Ritter M.D.  
Post-Care Instructions: I reviewed with the patient in detail post-care instructions. Patient is to wear sunprotection, and avoid picking at any of the treated lesions. Pt may apply Vaseline to crusted or scabbing areas.
Price (Use Numbers Only, No Special Characters Or $): 120
Total Number Of Lesions Treated: 10
Detail Level: Zone
Anesthesia Volume In Cc: 0.5
Consent: The patient's consent was obtained including but not limited to risks of crusting, scabbing, blistering, scarring, darker or lighter pigmentary change, recurrence, incomplete removal and infection.

## 2022-11-16 NOTE — PROGRESS NOTES
77 year old female with history of MIBC status post anterior pelvic exenteration with ileal conduit urinary diversion in 2019.    Now with left 1.5 cm renal stone. Presents for left percutaneous nephrolithotripsy. All risks and benefits discussed and informed consent given to me to proceed with the left PCNL.

## 2022-11-16 NOTE — DISCHARGE INSTRUCTIONS
DR. HAWTHORNE'S DISCHARGE INSTRUCTIONS FOLLOWING   PCNL (PERCUTANEOUS NEPHROLITHOTOMY)     DIET:  You can resume your regular diet. We encourage you to eat well-balanced and nutritious meals.      ACTIVITY:  Please restrain from strenuous activity or heavy lifting (more than 10 pounds) for the next week.  Please walk daily as much as tolerated, making exercise a part of your daily life. Do not drive while using narcotics for pain control.     WOUND CARE:  1. Your nephrostomy tube site may have leakage for the next several days. Please change dressing as needed, and be assured this is not dangerous, but annoying.        MEDICATIONS:  1. Please use over the counter Tylenol or Ibuprofen for pain control as needed  2. If you have severe pain refractory to Ibuprofen you may use oxycodone for pain control as well as prescribed.   3. If you are using aspirin, Plavix, or coumadin, please don’t restart these medications until 5 days after your discharge if you are not having large amounts of blood in the urine.      FOLLOW-UP:  We will call you to schedule your follow up appointment. If you have not heard from us in 1-2 business days, please call [phone] to schedule your follow-up appointment. You may also contact this number if you have questions or concerns that can be answered by Dr. Hawthorne’s staff.        WARNING SIGNS:  Fever greater than 101 degrees Fahrenheit, chills, nausea or vomiting, Large amount of clots in urine that make it difficult to urinate or for urine to drain from ackerman, increasing pain, or abdominal swelling. New candelaria or persistent bleeding from nephrostomy tube or tube site. If you are experiencing these symptoms, call the Urology Clinic or go to your local PCP or emergency room.    It is normal to see blood in your urine for up to 2 weeks even from surgery. The urine may clear up entirely, and then turn bloody again a few days later depending on your activity level; do not be alarmed. However, if you  experience severe pain or tenderness, have a lot of increased bleeding, or find that you are unable to urinate because of large clots, please notify your doctor immediately     MEDICAL HELP DURING NORMAL BUSINESS HOURS  Between the hours of 8 AM and 5 PM, please call [phone] to speak with Dr. Ritter’s staff.     MEDICAL HELP AFTER HOURS  If you have a serious emergency such as chest pain, shortness of breath, relentless pain you should call [phone]. For other urgent problems after hours you may contact the urology physician on call by phoning the [phone]. You may also visit the Emergency room at local [facility] for help.     For non-emergent problems such as prescription refills or routine questions, please do your best to contact us during normal business hours. This after-hours number should be used for urgent or emergent questions only.       Fadia Patel PAbiAAbi-C.   [address] [place] [address]   [phone]

## 2022-11-16 NOTE — ANESTHESIA PREPROCEDURE EVALUATION
Case: [id number] Date/Time: 11/16/22 1300    Procedures:       LEFT NEPHROSTOLITHOTOMY, PERCUTANEOUS, WITH DUAL ULTRASONIC LITHOTRIPSY      LITHOTRIPSY    Pre-op diagnosis: KIDNEY STONE    Location: [facility] OR 11 / SURGERY [facility]    Surgeons: Jaquan Ritter M.D.          Relevant Problems      (positive) BENJAMIN (acute kidney injury) (HCC)   (positive) Hydronephrosis due to obstruction of ureter      Other   (positive) Bladder cancer (HCC)     S/p bladder removal/conduit   Physical Exam    Airway   Mallampati: III  TM distance: >3 FB  Neck ROM: full       Cardiovascular - normal exam  Rhythm: regular  Rate: normal  (-) murmur     Dental - normal exam    Comments: Upper denture         Pulmonary - normal exam  Breath sounds clear to auscultation     Abdominal    Neurological - normal exam                 Anesthesia Plan    ASA 2       Plan - general       Airway plan will be ETT          Induction: intravenous      Pertinent diagnostic labs and testing reviewed    Informed Consent:    Anesthetic plan and risks discussed with patient and spouse.

## 2022-11-16 NOTE — OR NURSING
Patient arrived to PACU in stable condition.  L neph surgical site, dressing CDI  Urostomy emptied while in PACU  Medicated for pain per MAR  Report given to Nader CONSTANTINO, to return to pre-op for procedure this afternoon.

## 2022-11-16 NOTE — PROGRESS NOTES
Pt presents to IR2. Pt was consented by MD at bedside, confirmed by this RN and consent at bedside. Pt transferred to IR table in prone position. Patient underwent a left nephroureteral tube by Dr. Verdugo. Procedure site was marked by MD and verified using imaging guidance. Pt placed on monitor, prepped and draped in a sterile fashion. Vitals were taken every 5 minutes and remained stable during procedure (see doc flow sheet for results). CO2 waveform capnography was monitored and remained WNL throughout procedure. Report called to PACU rn  RN. Pt transported by elana with RN to 18 a pacu.          Tube in position for OR

## 2022-11-17 ENCOUNTER — APPOINTMENT (OUTPATIENT)
Dept: RADIOLOGY | Facility: MEDICAL CENTER | Age: 77
End: 2022-11-17
Payer: MEDICARE

## 2022-11-17 VITALS
WEIGHT: 154.1 LBS | HEART RATE: 60 BPM | TEMPERATURE: 98.7 F | BODY MASS INDEX: 28.36 KG/M2 | HEIGHT: 62 IN | OXYGEN SATURATION: 94 % | SYSTOLIC BLOOD PRESSURE: 110 MMHG | RESPIRATION RATE: 18 BRPM | DIASTOLIC BLOOD PRESSURE: 53 MMHG

## 2022-11-17 LAB
ANION GAP SERPL CALC-SCNC: 8 MMOL/L (ref 7–16)
BUN SERPL-MCNC: 24 MG/DL (ref 8–22)
CALCIUM SERPL-MCNC: 9.4 MG/DL (ref 8.5–10.5)
CHLORIDE SERPL-SCNC: 103 MMOL/L (ref 96–112)
CO2 SERPL-SCNC: 23 MMOL/L (ref 20–33)
CREAT SERPL-MCNC: 1.04 MG/DL (ref 0.5–1.4)
ERYTHROCYTE [DISTWIDTH] IN BLOOD BY AUTOMATED COUNT: 41.9 FL (ref 35.9–50)
GFR SERPLBLD CREATININE-BSD FMLA CKD-EPI: 55 ML/MIN/1.73 M 2
GLUCOSE SERPL-MCNC: 158 MG/DL (ref 65–99)
HCT VFR BLD AUTO: 37.6 % (ref 37–47)
HGB BLD-MCNC: 12.6 G/DL (ref 12–16)
MCH RBC QN AUTO: 32.1 PG (ref 27–33)
MCHC RBC AUTO-ENTMCNC: 33.5 G/DL (ref 33.6–35)
MCV RBC AUTO: 95.9 FL (ref 81.4–97.8)
PLATELET # BLD AUTO: 186 K/UL (ref 164–446)
PMV BLD AUTO: 9.5 FL (ref 9–12.9)
POTASSIUM SERPL-SCNC: 4.3 MMOL/L (ref 3.6–5.5)
RBC # BLD AUTO: 3.92 M/UL (ref 4.2–5.4)
SODIUM SERPL-SCNC: 134 MMOL/L (ref 135–145)
WBC # BLD AUTO: 10.6 K/UL (ref 4.8–10.8)

## 2022-11-17 PROCEDURE — 700101 HCHG RX REV CODE 250: Performed by: UROLOGY

## 2022-11-17 PROCEDURE — 80048 BASIC METABOLIC PNL TOTAL CA: CPT

## 2022-11-17 PROCEDURE — 96376 TX/PRO/DX INJ SAME DRUG ADON: CPT

## 2022-11-17 PROCEDURE — 700111 HCHG RX REV CODE 636 W/ 250 OVERRIDE (IP): Performed by: UROLOGY

## 2022-11-17 PROCEDURE — 74176 CT ABD & PELVIS W/O CONTRAST: CPT

## 2022-11-17 PROCEDURE — G0378 HOSPITAL OBSERVATION PER HR: HCPCS

## 2022-11-17 PROCEDURE — 85027 COMPLETE CBC AUTOMATED: CPT

## 2022-11-17 PROCEDURE — 700102 HCHG RX REV CODE 250 W/ 637 OVERRIDE(OP): Performed by: UROLOGY

## 2022-11-17 PROCEDURE — A9270 NON-COVERED ITEM OR SERVICE: HCPCS | Performed by: UROLOGY

## 2022-11-17 PROCEDURE — 96375 TX/PRO/DX INJ NEW DRUG ADDON: CPT

## 2022-11-17 RX ADMIN — KETOROLAC TROMETHAMINE 15 MG: 30 INJECTION, SOLUTION INTRAMUSCULAR at 00:18

## 2022-11-17 RX ADMIN — DOCUSATE SODIUM 100 MG: 100 CAPSULE, LIQUID FILLED ORAL at 04:49

## 2022-11-17 RX ADMIN — ACETAMINOPHEN 650 MG: 325 TABLET, FILM COATED ORAL at 04:48

## 2022-11-17 RX ADMIN — ACETAMINOPHEN 650 MG: 325 TABLET, FILM COATED ORAL at 00:16

## 2022-11-17 RX ADMIN — CEFTRIAXONE SODIUM 1000 MG: 10 INJECTION, POWDER, FOR SOLUTION INTRAVENOUS at 13:53

## 2022-11-17 RX ADMIN — CARVEDILOL 12.5 MG: 12.5 TABLET, FILM COATED ORAL at 08:18

## 2022-11-17 RX ADMIN — KETOROLAC TROMETHAMINE 15 MG: 30 INJECTION, SOLUTION INTRAMUSCULAR at 04:49

## 2022-11-17 ASSESSMENT — PAIN DESCRIPTION - PAIN TYPE
TYPE: ACUTE PAIN
TYPE: ACUTE PAIN

## 2022-11-17 NOTE — PROGRESS NOTES
RN contacted urology team to confirm starting continuous IV fluids (20 mEq potassium, 5% dextrous, 0.45% sodium chloride), at 2125, received call back at 2132. Ordered to hold fluids and saline lock patient.

## 2022-11-17 NOTE — PROGRESS NOTES
Pt in no acute distress no SOB. Discharge instruction information reviewed with pt. All questions answered. PIV removed.

## 2022-11-17 NOTE — CARE PLAN
The patient is Stable - Low risk of patient condition declining or worsening    Shift Goals  Clinical Goals: monitor output, maintain safety, pain management  Patient Goals: rest and pain management    Progress made toward(s) clinical / shift goals:  managed pain throughout shift, patient safety maintained, patient rested throughout the night.    Patient is not progressing towards the following goals: n/a        Problem: Pain - Standard  Goal: Alleviation of pain or a reduction in pain to the patient’s comfort goal  Outcome: Progressing     Problem: Fall Risk  Goal: Patient will remain free from falls  Outcome: Progressing     Problem: Knowledge Deficit - Standard  Goal: Patient and family/care givers will demonstrate understanding of plan of care, disease process/condition, diagnostic tests and medications  Outcome: Progressing

## 2022-11-17 NOTE — ANESTHESIA POSTPROCEDURE EVALUATION
Patient: Charlene Vincent    Procedure Summary     Date: 11/16/22 Room / Location: [place] [address] / SURGERY [facility]    Anesthesia Start: 1305 Anesthesia Stop: 1449    Procedure: LEFT NEPHROLITHOSTOMY, PERCUTANEOUS, LEFT URETEROSCOPY, LEFT ANTEGRADE NEPHROSTOGRAM (Left: Flank) Diagnosis: (KIDNEY STONE)    Surgeons: Jaquan Ritter M.D. Responsible Provider: Natalia Prince M.D.    Anesthesia Type: general ASA Status: 2          Final Anesthesia Type: general  Last vitals  BP   Blood Pressure : (!) 140/63, NIBP: 150/66    Temp   36.5 °C (97.7 °F)    Pulse   75   Resp   13    SpO2   98 %      Anesthesia Post Evaluation    Patient location during evaluation: PACU  Patient participation: complete - patient participated  Level of consciousness: awake and alert  Pain score: 0    Airway patency: patent  Anesthetic complications: no  Cardiovascular status: hemodynamically stable  Respiratory status: acceptable  Hydration status: euvolemic    PONV: none          No notable events documented.     Nurse Pain Score: 0 (NPRS)

## 2022-11-17 NOTE — OP REPORT
DATE OF SERVICE:  11/16/2022     PREOPERATIVE DIAGNOSES:   1.  Left nephrolithiasis.  2.  Left flank pain.  3.  History of muscle invasive bladder cancer, status post anterior pelvic   exenteration with ileal conduit urinary diversion in 2019.     OPERATIONS AND PROCEDURES PERFORMED:  1.  Left percutaneous tract dilation.  2.  Left percutaneous nephrolithotomy.  3.  Left antegrade flexible ureteroscopy with attempted basket of stone   fragment.  4.  Left antegrade nephrostogram.  5.  Left percutaneous nephrostomy to gravity drainage.     SURGEON:  Jaquan Ritter MD     ANESTHESIOLOGIST:  Natalia Prince MD     ANESTHESIA:  General endotracheal.     POSTOPERATIVE DIAGNOSES:  1.  Left nephrolithiasis.  2.  Left flank pain.  3.  History of anterior pelvic exenteration with ileal conduit urinary   diversion.  4.  Likely fragmented soft stone during access placement by interventional   radiology.     COMPLICATIONS:  None.     DRAINS:  A 22-Kyrgyz Meridian catheter in the left flank as a nephrostomy tube.     SPECIMENS:  None could be submitted due to soft tissue and small size.     ESTIMATED BLOOD LOSS:  Less than 50 mL.     INDICATIONS:  The patient is a pleasant 77-year-old patient with a history of   muscle invasive bladder cancer, who in the spring of 2019 underwent an   anterior pelvic exenteration and ileal conduit urinary diversion by myself.    In her postoperative followup, she has done well with excellent renal   function, but she developed left flank pain and was found on CAT scan to have   two 7 mm stones in the lower pole of the left kidney with intermittent pain.    She also has very small stone fragments in the right renal pelvis, all of   which were small enough to pass.  The patient was counseled in the office of   the treatment options and because accessing the ureter and kidney from below   and a conduit is quite difficult, I recommended a percutaneous approach.  We   discussed the plan for a percutaneous  nephroureteral catheter placement by   interventional radiology and subsequent tract dilation with nephrolithotripsy   by myself.  She is aware of the risk of the procedures including but not   limited to risk of perioperative urinary tract infection, urosepsis, a risk of   injury to adjacent organs including the pleura with pneumothorax, a risk of   injury to the bowel.  She is aware of risk of injury to the kidney with   significant bleeding requiring transfusion and subsequent risk of acquiring   hepatitis B, C, or HIV.  We also discussed the potential risk of injury to the   kidney with need for nephrectomy, albeit rare.  With the percutaneous   approach during treatment, there is a risk for a need for a stent and failure   to have the stent removed can result in significant stone burden and loss of   renal function.  In addition, we discussed that she will have a nephrostomy   tube afterwards temporarily and she is also aware of the delayed complication   of arteriovenous fistula with the development of gross hematuria in the future   and the patient is undergoing percutaneous surgery.  In addition, we   discussed the perioperative risk of deep vein thrombosis, pulmonary embolism,   aspiration pneumonia, heart attack, stroke and death.  Informed consent was   given to me by the patient to proceed in the presence of her  and she   is marked on her left thigh with my initials DH and letter Y for safe site   surgery.     DESCRIPTION OF PROCEDURE:  After informed consent was obtained, the patient   was brought to the operating room and placed supine.  Bilateral sequential   compression devices were in place and operational and a general endotracheal   anesthetic was administered in a balanced fashion.  Her urostomy was connected   to a Lopez catheter bag for drainage.  She was then placed in the prone   position.  An appropriate padding is performed.  Her arms were positioned in   the swimmer's position and  after appropriate padding and secured to the table,   the nephroureteral catheter, which has been placed in the left flank, the   dressing is taken down.  The operative area was Betadine prepped and draped in   the usual sterile fashion.  A surgical timeout was called and all members of   the operative team agree as the patient's name, procedure to be performed   without objections, attention was directed towards procedure.     At this point in time, the operative areas prepped with a small hole in the   neurosurgical drape, I was able to compress this to the flank and the   nephroureteral catheter had been prepped sterilely.  This was brought out   through a separate site.  Fluoroscopy was brought in and then I passed a 0.35   sensor wire down the nephroureteral catheter, which went into the conduit.    Once the nephroureteral catheter was removed, I passed a dual-lumen catheter   and a second wire was positioned, this was an Amplatz stiff wire and so now I   had a safety wire and operating wire.  Over the Amplatz wire, I passed the   NephroMax balloon dilator.  This was positioned into the renal pelvis and the   appropriate contrast media was used and the pressure was increased to 12   atmospheres of pressure, at which point the renal capsule opened nicely.    After this opened, compression was left in place for 2 minutes and then the   balloon was left inflated and I passed the nephrostomy access sheath into the   kidney.  Once the sheath was in the kidney, the balloon was deflated and was   removed.  I then passed the operating nephroscope into the kidney.  The renal   pelvis was visualized.  The safety wire and working wire were identified going   down the ureteropelvic junction.  I was able inspect the entire upper and mid   pole, there were no stones.  There were a couple of small little yellowish   debris consistent with probably soft stones, but I never could access any area   where a large stone was seen.   Subsequently, I was concerned that maybe the   stones had fragmented during the access and subsequently I switched to a   flexible cystoscope, which was passed in an antegrade fashion into the   proximal ureter.  In the ureter, there were 2 yellowish fragments.  I used a 0   tip basket to try and obtain a specimen for analysis, but when I grasped   these, they were too soft and too small to analyze.  As such, I passed the   cystoscope, which was 14-Indonesian down the ureter to the ureterovesical   junction.  The scope was not long enough to pass into the conduit with the   ureterointestinal anastomosis was widely patent.  After this, I went ahead and   withdrew the scope and used a flexible scope in the kidney.  I could not find   any stone fragments and suspected that the stone fragments had been   fragmented during interventional radiologic access.     At this point in time, after removing the flexible cystoscope, I passed the   operating nephroscope into the kidney.  I grabbed a couple of blood clots and   removed those with the paddle forceps.  Once these were removed, I went ahead   and passed a 22-Indonesian Elgin catheter through the operating sheath into the   kidney.  I inflated the balloon with half Isovue and sterile water to 3 mL and   then I did an antegrade nephrostogram, which showed complete opacification of   the upper, mid and lower pole anatomy without any filling defects.  The   ureter had spontaneous drainage down to the conduit as documented by   fluoroscopy and as such, I removed my safety wire and operating wire and there   was a minimal amount of extravasation through the access location.  At this   point in time, the sheath was pulled back.  The sheath was removed from the   22-Indonesian Councill catheter and the Councill catheter was secured to the skin   with a 2-0 silk suture and connected to gravity drainage.  A sterile dressing   was applied at the end of the case.  The patient had tolerated  the procedure   well, which went without complication.  Amount of blood loss was minimal,   certainly less than 100 mL and the patient was stable.  She subsequently was   flipped from a prone position to a supine position safely and then extubated   in the operating room and transferred to the recovery room where she arrived   in stable condition.        ______________________________  MD LUCIANO Dangelo/NATE/YAZ    DD:  11/17/2022 08:37  DT:  11/17/2022 09:41    Job#:  [id number]

## 2022-11-17 NOTE — DISCHARGE SUMMARY
Discharge Summary    CHIEF COMPLAINT ON ADMISSION  Left nephrolithiasis    Reason for Admission  Post operative care    Admission Date  11/16/2022    CODE STATUS  Full Code    HPI & HOSPITAL COURSE  This is a 77 y.o. female here with left renal stones who had planned PCNL however, when antegrade ureteroscopy was performed the stone was found to be fragmented. She was admitted overnight for observation and had a CT performed on POD #1 which did not reveal any residual stone fragments. Her NPT was removed bedside at that point and she was discharged home.       Therefore, she is discharged in good and stable condition to home with close outpatient follow-up.    The patient recovered much more quickly than anticipated on admission.    Discharge Date  11/17/22    FOLLOW UP ITEMS POST DISCHARGE  Discussed care for NPT site.     DISCHARGE DIAGNOSES  Principal Problem:    Left nephrolithiasis POA: Yes  Resolved Problems:    * No resolved hospital problems. *      FOLLOW UP    Jaquan Ritter M.D.  [address]  [place] [address]  [phone]    Follow up  we will contact for office follow up      MEDICATIONS ON DISCHARGE     Medication List        CONTINUE taking these medications        Instructions   acetaminophen 325 MG Tabs  Commonly known as: Tylenol   Take 2 Tabs by mouth every 6 hours as needed (Mild Pain; (Pain scale 1-3); Temp greater than 100.5 F).  Dose: 650 mg     atorvastatin 20 MG Tabs  Commonly known as: LIPITOR   Take 1 Tablet by mouth every evening.  Dose: 20 mg     carvedilol 12.5 MG Tabs  Commonly known as: COREG   Take 1 Tablet by mouth 2 times a day with meals.  Dose: 12.5 mg              Allergies  Allergies   Allergen Reactions    Decongestant Formula Unspecified     Heart palpitations       DIET  Orders Placed This Encounter   Procedures    Diet Order Diet: Regular     Standing Status:   Standing     Number of Occurrences:   1     Order Specific Question:   Diet:     Answer:   Regular [1]        ACTIVITY  Avoid exertional activity    CONSULTATIONS  none    PROCEDURES  Left percutaneous nephrolithotomy.  Left antegrade flexible ureteroscopy with attempted basket of stone   fragment.    LABORATORY  Lab Results   Component Value Date    SODIUM 134 (L) 11/17/2022    POTASSIUM 4.3 11/17/2022    CHLORIDE 103 11/17/2022    CO2 23 11/17/2022    GLUCOSE 158 (H) 11/17/2022    BUN 24 (H) 11/17/2022    CREATININE 1.04 11/17/2022        Lab Results   Component Value Date    WBC 10.6 11/17/2022    HEMOGLOBIN 12.6 11/17/2022    HEMATOCRIT 37.6 11/17/2022    PLATELETCT 186 11/17/2022        Total time of the discharge process exceeds 25 minutes.

## 2022-11-17 NOTE — PROGRESS NOTES
Assume care. Pt aox4, RA, vitals WNL. L nephrostomy, R ileal  conduit. 2/10 left flank pain. CT renal at 12:30 today. Continue to manage pain

## 2022-11-17 NOTE — OR NURSING
Patient recovered well post op. A&Ox4. VSS, 2L nc. Surgical sites CDI. Surgical pain managed. Patient able to drink fluids without Nausea and vomiting. Drains emptied prior to discharge of the PACU. Family brought back to the PACU as requested. PT belongings on the bedside. Spouse updated and discussed plan of care. Report called to [id number].

## 2023-12-27 ENCOUNTER — APPOINTMENT (OUTPATIENT)
Dept: URGENT CARE | Facility: PHYSICIAN GROUP | Age: 78
End: 2023-12-27
Payer: MEDICARE

## 2023-12-27 NOTE — PROGRESS NOTES
"Pt A&O x 4.     Vitals:  /50   Pulse 74   Temp 36.4 °C (97.6 °F) (Temporal)   Resp 16   Ht 1.549 m (5' 1\")   Wt 69.1 kg (152 lb 5.4 oz)   SpO2 96%   Breastfeeding? No   BMI 28.78 kg/m²      Pt rates pain 2 out of 10. Declines additional interventions at this time.      Neuro: FLORES. Denies new onset of numbness/ tingling.     Cardiac: Denies new onset of chest pain.     Vascular: Pulses 2+ BUE, BLE. No edema noted.     Respiratory: Lungs sound clear/diminished to auscultation. On room air.  on, satting in 90's. Denies SOB.     GI: Abdomen soft, tender. Normoactive bowel sounds, + flatus, - BM. - nausea/ vomiting.     : R urostomy in place, + output. Pt voiding adequately.      MSK: Pt up to bathroom with one assist and a front wheel walker, tolerating well.     Integumentary: Midline abd incision, approximated, YENI. RLQ EVER drain to self suction, + scant serosanguinous output. RLQ urostomy, appliance intact, stoma pink.     Labs noted.     Fall precautions in place: Bed locked in lowest position, Upper bed rails up, treaded socks in place, personal belongings within reach, call light within reach, appropriate mobility signs in place, - bed alarm. Pt calls appropriately.      Pt updated on POC.  " Pt failed bedside swallow. Notified MD Ortega via voalte. MD notified pt will not be able to take PO medications.

## (undated) DEVICE — GLOVE BIOGEL SZ 7.5 SURGICAL PF LTX - (50PR/BX 4BX/CA)

## (undated) DEVICE — TUBE CONNECT SUCTION CLEAR 120 X 1/4" (50EA/CA)"

## (undated) DEVICE — CATHETER URET DUAL LUMEN

## (undated) DEVICE — KIT ROOM DECONTAMINATION

## (undated) DEVICE — TUBE E-T HI-LO CUFF 6.5MM (10EA/BX)

## (undated) DEVICE — SPONGE XRAY 8X4 STERL. 12PL - (10EA/TY 80TY/CA)

## (undated) DEVICE — SODIUM CHL IRRIGATION 0.9% 1000ML (12EA/CA)

## (undated) DEVICE — COVER LIGHT HANDLE ALC PLUS DISP (18EA/BX)

## (undated) DEVICE — KIT 2.25IN UROS 2 PC DRN - 57MM 2 1/4 INCH (5/BX)

## (undated) DEVICE — CLIP MED INTNL HRZN TI ESCP - (25/BX)

## (undated) DEVICE — BLADE SURGICAL CLIPPER - (50EA/CA)

## (undated) DEVICE — WATER IRRIG. STER 3000 ML - (4/CA)

## (undated) DEVICE — BLANKET WARMING UPPER BODY - (10/CA)

## (undated) DEVICE — PAD LAP STERILE 18 X 18 - (5/PK 40PK/CA)

## (undated) DEVICE — ELECTRODE DUAL RETURN W/ CORD - (50/PK)

## (undated) DEVICE — SPECIMEN PLASTIC CONVERTOR - (10/CA)

## (undated) DEVICE — TRAY CATHETER FOLEY URINE METER W/STATLOCK 350ML (10EA/CA)

## (undated) DEVICE — SYRINGE ASEPTO - (50EA/CA

## (undated) DEVICE — SUCTION INSTRUMENT YANKAUER BULBOUS TIP W/O VENT (50EA/CA)

## (undated) DEVICE — TUBING CLEARLINK DUO-VENT - C-FLO (48EA/CA)

## (undated) DEVICE — WIRE GUIDE SENSOR DUAL FLEX - 5/BX

## (undated) DEVICE — SUTURE 4-0 CHROMIC RB-1 27 (36PK/BX)"

## (undated) DEVICE — PACK MINOR BASIN - (2EA/CA)

## (undated) DEVICE — SUTURE 4-0 VICRYL PLUS RB-1 - 27 INCH (36/BX)

## (undated) DEVICE — SET IRRIGATION CYSTOSCOPY Y-TYPE L81 IN (20EA/CA)

## (undated) DEVICE — GOWN SURGICAL X-LARGE ULTRA - FILM-REINFORCED (20/CA)

## (undated) DEVICE — CONNECTOR HOSE NEPTUNE FOR CYSTO ROOM

## (undated) DEVICE — SUTURE 2-0 VICRYL PLUS CT-1 36 (36PK/BX)"

## (undated) DEVICE — Device

## (undated) DEVICE — SET LEADWIRE 5 LEAD BEDSIDE DISPOSABLE ECG (1SET OF 5/EA)

## (undated) DEVICE — NEPTUNE 4 PORT MANIFOLD - (20/PK)

## (undated) DEVICE — KIT SURGIFLO W/OUT THROMBIN - (6EA/CA)

## (undated) DEVICE — RESERVOIR SUCTION 100 CC - SILICONE (20EA/CA)

## (undated) DEVICE — GLOVE BIOGEL INDICATOR SZ 7.5 SURGICAL PF LTX - (50PR/BX 4BX/CA)

## (undated) DEVICE — CATH, COUNCIL TIP 22 FR

## (undated) DEVICE — SPONGE RADIOPAQUE CTN X-LG - STERILE (50PK/CA) MADE TO ORDER ITEM AND HAS A 4-6 WEEK LEAD TIME

## (undated) DEVICE — SET EXTENSION WITH 2 PORTS (48EA/CA) ***PART #2C8610 IS A SUBSTITUTE*****

## (undated) DEVICE — TOWELS CLOTH SURGICAL - (4/PK 20PK/CA)

## (undated) DEVICE — SUTURE 2-0 SILK FS (12EA/BX)

## (undated) DEVICE — STAPLER SKIN DISP - (6/BX 10BX/CA) VISISTAT

## (undated) DEVICE — GUIDEWIRE AMPLATZ STIFF .035 145CM 7 STRAIGHT (5/BX)

## (undated) DEVICE — TOWEL STOP TIMEOUT SAFETY FLAG (40EA/CA)

## (undated) DEVICE — GOWN WARMING STANDARD FLEX - (30/CA)

## (undated) DEVICE — BAG, SPONGE COUNT 50600

## (undated) DEVICE — SYRINGE DISP. 60 CC LL - (30/BX, 12BX/CA)**WHEN THESE ARE GONE ORDER #500206**

## (undated) DEVICE — VESSELOOP MAXI BLUE STERILE- SURG-I-LOOP (10EA/BX)

## (undated) DEVICE — SUTURE 3-0 VICRYL PLUS SH - 27 INCH (36/BX)

## (undated) DEVICE — SOD. CHL. INJ. 0.9% 1000 ML - (14EA/CA 60CA/PF)

## (undated) DEVICE — SUTURE 3-0 VICRYL PLUS SH - 8X 18 INCH (12/BX)

## (undated) DEVICE — ARMREST CRADLE FOAM - (2PR/PK 12PR/CA)

## (undated) DEVICE — BAG URODRAIN WITH TUBING - (20/CA)

## (undated) DEVICE — BOVIE  BLADE 6 EXTENDED - (50/PK)

## (undated) DEVICE — CONTAINER SPECIMEN BAG OR - STERILE 4 OZ W/LID (100EA/CA)

## (undated) DEVICE — DRESSING ABDOMINAL PAD STERILE 8 X 10" (360EA/CA)"

## (undated) DEVICE — GOWN SURGEONS LARGE - (32/CA)

## (undated) DEVICE — KIT PROBE DISPOSABLE SWISS LITHOCLAST TRILOGY L340MM X W3.4MM (1/EA)

## (undated) DEVICE — DRAPE LARGE 3 QUARTER - (20/CA)

## (undated) DEVICE — TUBE E-T HI-LO CUFF 7.5MM (10EA/PK)

## (undated) DEVICE — SPONGE GAUZESTER 4 X 4 4PLY - (128PK/CA)

## (undated) DEVICE — STAPLER 55MM LINEAR OPEN BLUE 3.8MM W/STAPLE (3EA/BX)

## (undated) DEVICE — TRAY SRGPRP PVP IOD WT PRP - (20/CA)

## (undated) DEVICE — SENSOR SPO2 NEO LNCS ADHESIVE (20/BX) SEE USER NOTES

## (undated) DEVICE — CANISTER SUCTION 3000ML MECHANICAL FILTER AUTO SHUTOFF MEDI-VAC NONSTERILE LF DISP  (40EA/CA)

## (undated) DEVICE — CLIP MED LG INTNL HRZN TI ESCP - (20/BX)

## (undated) DEVICE — GLOVE BIOGEL PI INDICATOR SZ 6.5 SURGICAL PF LF - (50/BX 4BX/CA)

## (undated) DEVICE — SLEEVE, VASO, THIGH, MED

## (undated) DEVICE — GLOVE SZ 6.5 BIOGEL PI MICRO - PF LF (50PR/BX)

## (undated) DEVICE — DRAPE UNDER BUTTOCK LRG (40/CA)

## (undated) DEVICE — PAD BABY LAP 4X18 W/O - RINGS PREWASHED 5/PK 40PK/CS

## (undated) DEVICE — STAPLE 55MM LINEAR BLUE 3.8MM (12EA/BX)

## (undated) DEVICE — SYRINGE 50ML CATHETER TIP (40EA/BX 4BX/CA)

## (undated) DEVICE — NEEDLE SAFETY  22 GA 1-1/2 IN (50/BX)

## (undated) DEVICE — SUTURE GENERAL

## (undated) DEVICE — PROTECTOR ULNA NERVE - (36PR/CA)

## (undated) DEVICE — DRAPE MAYO STAND - (30/CA)

## (undated) DEVICE — BASKET ZERO 1.9FR X 120CM

## (undated) DEVICE — K-PAD 24X30 SM. BABY THERMIA - BLANKET (10EA/CT)

## (undated) DEVICE — SET FLUID WARMING STANDARD FLOW - (10/CA)

## (undated) DEVICE — SUTURE 2-0 VICRYL PLUS SH - 27 INCH (36/BX)

## (undated) DEVICE — SPONGE PEANUT - (5/PK 50PK/CA)

## (undated) DEVICE — DRAPE T CRANIOTOMY W/POUCH - (9/CA)

## (undated) DEVICE — LEGGING LITHOTOMY 31 X 48 IN - (2EA/PK 20PK/CA)

## (undated) DEVICE — DRESSING LEUKOMED STERILE 11.75X4IN - (50/CA)

## (undated) DEVICE — CLIP LG INTNL HRZN TI ESCP LGT - (20/BX)

## (undated) DEVICE — SYRINGE 10 ML CONTROL LL (25EA/BX 4BX/CA)

## (undated) DEVICE — SUTURE 2-0 VICRYL PLUS CT-2 - 27 INCH (36/BX)

## (undated) DEVICE — CHLORAPREP 26 ML APPLICATOR - ORANGE TINT(25/CA)

## (undated) DEVICE — ELECTRODE 850 FOAM ADHESIVE - HYDROGEL RADIOTRNSPRNT (50/PK)

## (undated) DEVICE — BAG DRAINAGE URINARY CLOSED 2000ML (20EA/CA)

## (undated) DEVICE — KIT ANESTHESIA W/CIRCUIT & 3/LT BAG W/FILTER (20EA/CA)

## (undated) DEVICE — GLOVE BIOGEL SZ 6 PF LATEX - (50EA/BX 4BX/CA)

## (undated) DEVICE — GLOVE BIOGEL INDICATOR SZ 6 SURGICAL PF LTX -(50/BX)

## (undated) DEVICE — PACK CYSTO III (2EA/CA)

## (undated) DEVICE — DRAPE C-ARM LARGE 41IN X 74 IN - (10/BX 2BX/CA)

## (undated) DEVICE — DRAPE LAPAROTOMY T SHEET - (12EA/CA)

## (undated) DEVICE — SENSOR OXIMETER ADULT SPO2 RD SET (20EA/BX)

## (undated) DEVICE — HEAD HOLDER JUNIOR/ADULT

## (undated) DEVICE — CATHETER BALLOON NEPHROMAX

## (undated) DEVICE — MASK ANESTHESIA ADULT  - (100/CA)

## (undated) DEVICE — SODIUM CHL. IRRIGATION 0.9% 3000ML (4EA/CA 65CA/PF)

## (undated) DEVICE — SUTURE 3-0 SILK SH 30 (36PK/BX)

## (undated) DEVICE — GLOVE BIOGEL INDICATOR SZ 6.5 SURGICAL PF LTX - (50PR/BX 4BX/CA)

## (undated) DEVICE — PLUG CATHETER DRAIN TUBE PROTECTOR CAP

## (undated) DEVICE — SPONGE DRAIN 4 X 4IN 6-PLY - (2/PK25PK/BX12BX/CS)

## (undated) DEVICE — SUTURE 1 PDS II PLUS TP-1 - (12PK/BX)

## (undated) DEVICE — GLOVE BIOGEL PI ORTHO SZ 7 PF LF (40PR/BX)

## (undated) DEVICE — LACTATED RINGERS INJ 1000 ML - (14EA/CA 60CA/PF)

## (undated) DEVICE — DETERGENT RENUZYME PLUS 10 OZ PACKET (50/BX)

## (undated) DEVICE — DRAIN J-VAC 10MM FLAT - (10/CA)

## (undated) DEVICE — LIGASURE TISSUE FUSION  - SINGLE USE (6/CA)

## (undated) DEVICE — STAPLER 60MM BLUE 3.5MM WITH - STAPLE (3EA/BX)

## (undated) DEVICE — GOWN SURGEONS X-LARGE - DISP. (30/CA)

## (undated) DEVICE — DRESSING NON-ADHERING 8 X 3 - (50/BX)

## (undated) DEVICE — PACK MAJOR BASIN - (2EA/CA)